# Patient Record
Sex: FEMALE | Race: WHITE | NOT HISPANIC OR LATINO | Employment: OTHER | URBAN - METROPOLITAN AREA
[De-identification: names, ages, dates, MRNs, and addresses within clinical notes are randomized per-mention and may not be internally consistent; named-entity substitution may affect disease eponyms.]

---

## 2017-01-20 ENCOUNTER — TELEPHONE (OUTPATIENT)
Dept: OBSTETRICS AND GYNECOLOGY | Facility: CLINIC | Age: 67
End: 2017-01-20

## 2017-01-20 NOTE — TELEPHONE ENCOUNTER
Pt called and and stated she has vaginally itching. She took a diflucan this morning. Advised her that it may take up to 2 days to work. Advised her to call back if it doesn't resolve. She voiced understanding.

## 2017-01-20 NOTE — TELEPHONE ENCOUNTER
"----- Message from Cee Castanon sent at 1/20/2017  8:37 AM CST -----  Contact: thomas   "burning sensation in that area"   X 3 days   Call back    "

## 2017-01-23 ENCOUNTER — TELEPHONE (OUTPATIENT)
Dept: OBSTETRICS AND GYNECOLOGY | Facility: CLINIC | Age: 67
End: 2017-01-23

## 2017-01-25 ENCOUNTER — TELEPHONE (OUTPATIENT)
Dept: OBSTETRICS AND GYNECOLOGY | Facility: CLINIC | Age: 67
End: 2017-01-25

## 2017-01-25 DIAGNOSIS — L90.0 LICHEN SCLEROSUS ET ATROPHICUS: ICD-10-CM

## 2017-01-26 RX ORDER — CLOBETASOL PROPIONATE 0.5 MG/G
CREAM TOPICAL
Qty: 30 G | Refills: 1 | Status: SHIPPED | OUTPATIENT
Start: 2017-01-26 | End: 2017-04-05 | Stop reason: SDUPTHER

## 2017-02-02 ENCOUNTER — TELEPHONE (OUTPATIENT)
Dept: OBSTETRICS AND GYNECOLOGY | Facility: CLINIC | Age: 67
End: 2017-02-02

## 2017-02-02 NOTE — TELEPHONE ENCOUNTER
----- Message from Rylie Frey sent at 2/2/2017 12:55 PM CST -----  Contact: Herminia  Patient is calling regarding a change in medication to replace Rx Clobetasol Propionate 15 mg as insurance has given three alternatives for lower cost: Mometasone, Fluticasone, and Betamethasone Valerate. States if Dr Coronel does not want to change it is okay with patient. Please call with outcome [phone]. Thanks!

## 2017-02-03 RX ORDER — BETAMETHASONE VALERATE 1.2 MG/G
CREAM TOPICAL 2 TIMES DAILY
Qty: 45 G | Refills: 1 | Status: SHIPPED | OUTPATIENT
Start: 2017-02-03 | End: 2018-07-22

## 2017-03-15 ENCOUNTER — TELEPHONE (OUTPATIENT)
Dept: GASTROENTEROLOGY | Facility: CLINIC | Age: 67
End: 2017-03-15

## 2017-03-15 NOTE — TELEPHONE ENCOUNTER
----- Message from Deon Hughes sent at 3/15/2017 10:16 AM CDT -----  Pt called stated that she need a call back regarding her medication/pls call back at [phone]

## 2017-03-16 ENCOUNTER — OFFICE VISIT (OUTPATIENT)
Dept: GASTROENTEROLOGY | Facility: CLINIC | Age: 67
End: 2017-03-16
Payer: MEDICARE

## 2017-03-16 VITALS
HEIGHT: 60 IN | HEART RATE: 76 BPM | SYSTOLIC BLOOD PRESSURE: 130 MMHG | BODY MASS INDEX: 34.89 KG/M2 | WEIGHT: 177.69 LBS | DIASTOLIC BLOOD PRESSURE: 82 MMHG | RESPIRATION RATE: 18 BRPM

## 2017-03-16 DIAGNOSIS — R10.32 LEFT LOWER QUADRANT PAIN: Primary | ICD-10-CM

## 2017-03-16 DIAGNOSIS — Z87.19 HISTORY OF COLITIS: ICD-10-CM

## 2017-03-16 DIAGNOSIS — Z87.19 HISTORY OF DIVERTICULOSIS: ICD-10-CM

## 2017-03-16 DIAGNOSIS — B37.0 CANDIDIASIS, MOUTH: ICD-10-CM

## 2017-03-16 DIAGNOSIS — R79.89 ELEVATED LIVER FUNCTION TESTS: ICD-10-CM

## 2017-03-16 DIAGNOSIS — Z86.010 HISTORY OF COLON POLYPS: ICD-10-CM

## 2017-03-16 DIAGNOSIS — K21.00 GASTROESOPHAGEAL REFLUX DISEASE WITH ESOPHAGITIS: ICD-10-CM

## 2017-03-16 DIAGNOSIS — Z87.2 HISTORY OF LICHEN PLANUS: ICD-10-CM

## 2017-03-16 PROCEDURE — 1159F MED LIST DOCD IN RCRD: CPT | Mod: S$GLB,,, | Performed by: NURSE PRACTITIONER

## 2017-03-16 PROCEDURE — 1160F RVW MEDS BY RX/DR IN RCRD: CPT | Mod: S$GLB,,, | Performed by: NURSE PRACTITIONER

## 2017-03-16 PROCEDURE — 99214 OFFICE O/P EST MOD 30 MIN: CPT | Mod: S$GLB,,, | Performed by: NURSE PRACTITIONER

## 2017-03-16 PROCEDURE — 99999 PR PBB SHADOW E&M-EST. PATIENT-LVL V: CPT | Mod: PBBFAC,,, | Performed by: NURSE PRACTITIONER

## 2017-03-16 PROCEDURE — 1157F ADVNC CARE PLAN IN RCRD: CPT | Mod: S$GLB,,, | Performed by: NURSE PRACTITIONER

## 2017-03-16 PROCEDURE — 1125F AMNT PAIN NOTED PAIN PRSNT: CPT | Mod: S$GLB,,, | Performed by: NURSE PRACTITIONER

## 2017-03-16 RX ORDER — FLUTICASONE PROPIONATE 50 MCG
1 SPRAY, SUSPENSION (ML) NASAL 2 TIMES DAILY PRN
COMMUNITY

## 2017-03-16 RX ORDER — NYSTATIN 100000 [USP'U]/ML
4 SUSPENSION ORAL 4 TIMES DAILY
Qty: 224 ML | Refills: 0 | Status: SHIPPED | OUTPATIENT
Start: 2017-03-16 | End: 2017-03-30

## 2017-03-16 NOTE — MR AVS SNAPSHOT
[facility]  [address] [facility] [address]  [facility] [address]  Phone: [phone]                  Herminia Wooten   3/16/2017 2:00 PM   Office Visit    Description:  Female : 1950   Provider:  SHANNAN Mcgraw   Department:  [facility]           Reason for Visit     Abdominal Pain           Diagnoses this Visit        Comments    Left lower quadrant pain    -  Primary     History of diverticulosis         History of colitis         Elevated liver function tests         History of colon polyps         Candidiasis, mouth                To Do List           Future Appointments        Provider Department Dept Phone    2017 10:00 AM Emerald Coronel MD [facility] - [phone]      Goals (5 Years of Data)     None      Follow-Up and Disposition     Return in about 2 months (around 2017), or if symptoms worsen or fail to improve.       These Medications        Disp Refills Start End    nystatin (MYCOSTATIN) 100,000 unit/mL suspension 224 mL 0 3/16/2017 3/30/2017    Take 4 mLs (400,000 Units total) by mouth 4 (four) times daily. Swish and swallow - Oral    Pharmacy: [facility] [address] [place] [address] AT [facility] Ph #: [phone]         [facility]     [facility] Nurse Care Line - 24/7 Assistance  Registered nurses in the [facility] provide clinical advisement, health education, appointment booking, and other advisory services.  Call for this free service at [phone].             Medications           Message regarding Medications     Verify the changes and/or additions to your medication regime listed below are the same as discussed with your clinician today.  If any of these changes or additions are incorrect, please notify your healthcare provider.        START taking these NEW medications        Refills    nystatin (MYCOSTATIN) 100,000 unit/mL suspension 0    Sig:  Take 4 mLs (400,000 Units total) by mouth 4 (four) times daily. Swish and swallow    Class: Normal    Route: Oral      STOP taking these medications     azelastine-fluticasone (DYMISTA) 137-50 mcg/spray Spry 1 spray by Nasal route 2 (two) times daily.    cephALEXin (KEFLEX) 500 MG capsule Take 500 mg by mouth 2 times daily 2 hours after meal.    hydrocodone-acetaminophen 5-325mg (NORCO) 5-325 mg per tablet Take 1 tablet by mouth every 6 (six) hours as needed for Pain.    hydrocortisone (ANUSOL-HC) 25 mg suppository 25 mg 2 (two) times daily as needed.     olmesartan (BENICAR) 20 MG tablet Take 10 mg by mouth once daily.    pantoprazole (PROTONIX) 40 MG tablet TAKE 1 TABLET(40 MG) BY MOUTH BEFORE BREAKFAST           Verify that the below list of medications is an accurate representation of the medications you are currently taking.  If none reported, the list may be blank. If incorrect, please contact your healthcare provider. Carry this list with you in case of emergency.           Current Medications     ACETYLCYSTEINE (N-ACETYL-L-CYSTEINE MISC) 600 mg by Misc.(Non-Drug; Combo Route) route once daily.    albuterol 90 mcg/actuation inhaler Inhale 2 puffs into the lungs every 4 (four) hours as needed for Wheezing. Rescue    aspirin (ECOTRIN) 81 MG EC tablet Take 81 mg by mouth once daily.    atenolol (TENORMIN) 25 MG tablet Take 12.5 mg by mouth once daily.     atorvastatin (LIPITOR) 20 MG tablet Take 20 mg by mouth once daily.    cholecalciferol, vitamin D3, (VITAMIN D3) 2,000 unit Tab Take by mouth once daily.    ciprofloxacin HCl (CIPRO) 500 MG tablet Take 1 tablet (500 mg total) by mouth 2 (two) times daily.    clobetasol (TEMOVATE) 0.05 % cream APPLY 1/2 GRAM TOPICALLY twice weekly    estradiol (ESTRACE) 0.01 % (0.1 mg/gram) vaginal cream Place 1 g vaginally every other day.    fluconazole (DIFLUCAN) 150 MG Tab Take 1 tablet (150 mg total) by mouth Every 3 (three) days.    fluticasone (FLONASE) 50 mcg/actuation  "nasal spray 1 spray by Each Nare route once daily.    ibuprofen (ADVIL,MOTRIN) 600 MG tablet Take 1 tablet (600 mg total) by mouth every 6 (six) hours as needed for Pain.    levothyroxine (SYNTHROID) 25 MCG tablet 25 mcg once daily.     lorazepam (ATIVAN) 0.5 MG tablet Take 0.5 mg by mouth every 6 (six) hours as needed.      losartan (COZAAR) 25 MG tablet Take 25 mg by mouth once daily.    meclizine (ANTIVERT) 25 mg tablet 25 mg 3 (three) times daily as needed.     methen-m.blue-s.phos-phsal-hyo 118-10-40.8-36 mg Cap Take 1 capsule by mouth once daily.    metronidazole (FLAGYL) 500 MG tablet Take 1 tablet (500 mg total) by mouth 3 (three) times daily.    ondansetron (ZOFRAN-ODT) 4 MG TbDL Take 1-2 tablets (4-8 mg total) by mouth every 8 (eight) hours as needed.    phenazopyridine (PYRIDIUM) 100 MG tablet TAKE ONE TABLET BY MOUTH THREE TIMES DAILY AS NEEDED FOR PAIN    ranitidine (ZANTAC) 150 MG tablet Take 2 tablets (300 mg total) by mouth nightly.    sodium chloride (SALINE NASAL) 0.65 % nasal spray 1 spray by Nasal route as needed.    triamcinolone acetonide 0.1% (KENALOG) 0.1 % paste     vitamin D 1000 units Tab Take 185 mg by mouth once daily.    betamethasone valerate 0.1% (VALISONE) 0.1 % Crea Apply topically 2 (two) times daily.    dicyclomine (BENTYL) 10 MG capsule Take 1 capsule (10 mg total) by mouth 4 (four) times daily before meals and nightly. 1 or 2 caps, AC & HS, to ease bloating and diarrhea.    mometasone 220 mcg (30 doses) inhaler Inhale 2 puffs into the lungs once daily.    nystatin (MYCOSTATIN) 100,000 unit/mL suspension Take 4 mLs (400,000 Units total) by mouth 4 (four) times daily. Swish and swallow           Clinical Reference Information           Your Vitals Were     BP Pulse Resp Height Weight Last Period    130/82 76 18 4' 11.5" (1.511 m) 80.6 kg (177 lb 11.1 oz) 10/07/1976    BMI                35.29 kg/m2          Blood Pressure          Most Recent Value    BP  130/82      Allergies as " of 3/16/2017     Codeine    Caffeine    Percodan [Oxycodone Hcl-oxycodone-asa]      Immunizations Administered on Date of Encounter - 3/16/2017     None      Orders Placed During Today's Visit     Future Labs/Procedures Expected by Expires    CBC auto differential  3/16/2017 5/15/2018    CT Abdomen Pelvis With Contrast  3/16/2017 3/16/2018    Hepatic function panel  3/16/2017 5/15/2018    Hepatitis panel, acute  3/16/2017 3/16/2018      Instructions      Abdominal Pain    Abdominal pain is pain in the stomach or belly area. Everyone has this pain from time to time. In many cases it goes away on its own. But abdominal pain can sometimes be due to a serious problem, such as appendicitis. So its important to know when to seek help.  Causes of abdominal pain  There are many possible causes of abdominal pain. Common causes in adults include:  · Constipation, diarrhea, or gas  · Stomach acid flowing back up into the esophagus (acid reflux or heartburn)  · Severe acid reflux, called GERD (gastroesophageal reflux disease)  · A sore in the lining of the stomach or small intestine (peptic ulcer)  · Inflammation of the gallbladder, liver, or pancreas  · Gallstones or kidney stones  · Appendicitis   · Intestinal blockage   · An internal organ pushing through a muscle or other tissue (hernia)  · Urinary tract infections  · In women, menstrual cramps, fibroids, or endometriosis  · Inflammation or infection of the intestines  Diagnosing the cause of abdominal pain  Your healthcare provider will do a physical exam help find the cause of your pain. If needed, tests will be ordered. Belly pain has many possible causes. So it can be hard to find the reason for your pain. Giving details about your pain can help. Tell your provider where and when you feel the pain, and what makes it better or worse. Also let your provider know if you have other symptoms such as:  · Fever  · Tiredness  · Upset stomach (nausea)  · Vomiting  · Changes in  bathroom habits  Treating abdominal pain  Some causes of pain need emergency medical treatment right away. These include appendicitis or a bowel blockage. Other problems can be treated with rest, fluids, or medicines. Your healthcare provider can give you specific instructions for treatment or self-care based on what is causing your pain.  If you have vomiting or diarrhea, sip water or other clear fluids. When you are ready to eat solid foods again, start with small amounts of easy-to-digest, low-fat foods. These include apple sauce, toast, or crackers.   When to seek medical care  Call 911 or go to the hospital right away if you:  · Cant pass stool and are vomiting  · Are vomiting blood or have bloody diarrhea or black, tarry diarrhea  · Have chest, neck, or shoulder pain  · Feel like you might pass out  · Have pain in your shoulder blades with nausea  · Have sudden, severe belly pain  · Have new, severe pain unlike any you have felt before  · Have a belly that is rigid, hard, and tender to touch  Call your healthcare provider if you have:  · Pain for more than 5 days  · Bloating for more than 2 days  · Diarrhea for more than 5 days  · A fever of 100.4°F (38.0°C) or higher, or as directed by your provider  · Pain that gets worse  · Weight loss for no reason  · Continued lack of appetite  · Blood in your stool  How to prevent abdominal pain  Here are some tips to help prevent abdominal pain:  · Eat smaller amounts of food at one time.  · Avoid greasy, fried, or other high-fat foods.  · Avoid foods that give you gas.  · Exercise regularly.  · Drink plenty of fluids.  To help prevent GERD symptoms:  · Quit smoking.  · Reduce alcohol and certain foods that increase stomach acid.  · Avoid aspirin and over-the-counter pain and fever medicines (NSAIDS or nonsteroidal anti-inflammatory drugs), if possible  · Lose extra weight.  · Finish eating at least 2 hours before you go to bed or lie down.  · Raise the head of your  bed.  Date Last Reviewed: 7/1/2016  © 6085-8293 [employer]. [address] [place] [address], [place] [address]. All rights reserved. This information is not intended as a substitute for professional medical care. Always follow your healthcare professional's instructions.        Understanding Diverticulosis and Diverticulitis     Pouches or diverticula usually occur in the lower part of the colon called the sigmoid.     The colon (large intestine) is the last part of the digestive tract. It absorbs water from stool and changes it from a liquid to a solid. In certain cases, small pouches called diverticula can form in the colon wall. This condition is called diverticulosis. The pouches can become infected. If this happens, it becomes a more serious problem called diverticulitis. These problems can be painful. But they can be managed.  Managing your condition  Diet changes or medicines may be prescribed.   If you have diverticulosis  Recommendations include:  · Diet changes are often enough to control symptoms. The main changes are adding fiber (roughage) and drinking more water. Fiber absorbs water as it travels through your colon. This helps your stool stay soft and move smoothly. Water helps this process.  · If needed, you may be told to take over-the-counter stool softeners.  · To help relieve pain, antispasmodic medicines may be prescribed.  · Watch for changes in your bowel movements. Tell the healthcare provider if you notice any changes.  · Begin an exercise program. Ask your healthcare provider how to get started.  · Get plenty of rest and sleep.   If you have diverticulitis  Treatment depends on how bad your symptoms are.  · For mild symptoms. You may be put on a liquid diet for a short time. Antibiotics are usually prescribed. If these two steps relieve your symptoms, you may then be prescribed a high-fiber diet. If you still have symptoms, your healthcare provider will discuss more treatment choices  with you.  · For severe symptoms. You may need to be admitted to the hospital. There, you can be given IV antibiotics and fluids. You will also be put on a low-fiber or liquid diet. Although not common, surgery is needed in some people with severe symptoms.  [place] to colon health     Diverticulitis occurs when the pouches become infected or inflamed.     Help keep your colon healthy with a diet that includes plenty of high-fiber fruits, vegetables, and whole grains. Drink plenty of liquids like water and juice. Maintain a healthy lifestyle including regular exercise, stress management, and adequate rest and sleep.   Date Last Reviewed: 7/1/2016 © 2000-2016 [employer]. [address] [place] [address], [place] [address]. All rights reserved. This information is not intended as a substitute for professional medical care. Always follow your healthcare professional's instructions.        Discharge Instructions for Diverticulitis  You have been diagnosed with diverticulitis. This is a condition in which small pouches form in your colon (large intestine) and become inflamed or infected. Follow the guidelines below for home care.  As you recover  Tips for recovery include:  · Eat a low-fiber diet. Your healthcare provider may advise a liquid diet. This gives your bowel a chance to rest so that it can recover.  · Foods to include: flake cereal, mashed potatoes, pancakes, waffles, pasta, white bread, rice, applesauce, bananas, eggs, fish, poultry, tofu, and well-cooked vegetables  · Take your medicines as directed. Do not stop taking the medicines, even if you feel better.  · Monitor your temperature and report any rising temperature to your healthcare provider.  · Take antibiotics exactly as directed. Do not miss any and keep taking them even if you feel better.   · Drink 6 to 8 glasses of water every day, unless directed otherwise.  · Use a heating pad or hot water bottle to reduce abdominal cramping or  pain.  Preventing diverticulitis in the future  Tips for prevention include:  · Eat a high-fiber diet. Fiber adds bulk to the stool so that it passes through the large intestine more easily.  · Keep drinking 6 to 8 glasses of water every day, unless directed otherwise.  · Begin an exercise program. Ask your healthcare provider how to get started. You can benefit from simple activities such as walking or gardening.  · Treat diarrhea with a bland diet. Start with liquids only, then slowly add fiber over time.  · Watch for changes in your bowel movements (constipation to diarrhea).  · Avoid constipation with fiber and add a stool softener if needed.   · Get plenty of rest and sleep.  Follow-up care  Make a follow-up appointment as directed by our staff.  When to call your healthcare provider  Call your healthcare provider immediately if you have any of the following:  · Fever of 100.4°F (38.0°C) or higher, or as directed by your healthcare provider  · Chills  · Severe cramps in the belly, most commonly the lower left side  · Tenderness in the belly, most commonly the lower left side  · Nausea and vomiting  · Bleeding from your rectum   Date Last Reviewed: 7/1/2016  © 4817-2460 [employer]. [address] [place] [address]. All rights reserved. This information is not intended as a substitute for professional medical care. Always follow your healthcare professional's instructions.             Language Assistance Services     ATTENTION: Language assistance services are available, free of charge. Please call [phone].      ATENCIÓN: Si habla español, tiene a arias disposición servicios gratuitos de asistencia lingüística. Llame al [phone].     [facility] Ý: N?u b?n nói Ti?ng Vi?t, có các d?ch v? h? tr? ngôn ng? mi?n phí dành cho b?n. G?i s? [phone].         [facility] complies with applicable Federal civil rights laws and does not discriminate on the basis of race, color,  national origin, age, disability, or sex.

## 2017-03-16 NOTE — PATIENT INSTRUCTIONS
Abdominal Pain    Abdominal pain is pain in the stomach or belly area. Everyone has this pain from time to time. In many cases it goes away on its own. But abdominal pain can sometimes be due to a serious problem, such as appendicitis. So its important to know when to seek help.  Causes of abdominal pain  There are many possible causes of abdominal pain. Common causes in adults include:  · Constipation, diarrhea, or gas  · Stomach acid flowing back up into the esophagus (acid reflux or heartburn)  · Severe acid reflux, called GERD (gastroesophageal reflux disease)  · A sore in the lining of the stomach or small intestine (peptic ulcer)  · Inflammation of the gallbladder, liver, or pancreas  · Gallstones or kidney stones  · Appendicitis   · Intestinal blockage   · An internal organ pushing through a muscle or other tissue (hernia)  · Urinary tract infections  · In women, menstrual cramps, fibroids, or endometriosis  · Inflammation or infection of the intestines  Diagnosing the cause of abdominal pain  Your healthcare provider will do a physical exam help find the cause of your pain. If needed, tests will be ordered. Belly pain has many possible causes. So it can be hard to find the reason for your pain. Giving details about your pain can help. Tell your provider where and when you feel the pain, and what makes it better or worse. Also let your provider know if you have other symptoms such as:  · Fever  · Tiredness  · Upset stomach (nausea)  · Vomiting  · Changes in bathroom habits  Treating abdominal pain  Some causes of pain need emergency medical treatment right away. These include appendicitis or a bowel blockage. Other problems can be treated with rest, fluids, or medicines. Your healthcare provider can give you specific instructions for treatment or self-care based on what is causing your pain.  If you have vomiting or diarrhea, sip water or other clear fluids. When you are ready to eat solid foods again,  start with small amounts of easy-to-digest, low-fat foods. These include apple sauce, toast, or crackers.   When to seek medical care  Call 911 or go to the hospital right away if you:  · Cant pass stool and are vomiting  · Are vomiting blood or have bloody diarrhea or black, tarry diarrhea  · Have chest, neck, or shoulder pain  · Feel like you might pass out  · Have pain in your shoulder blades with nausea  · Have sudden, severe belly pain  · Have new, severe pain unlike any you have felt before  · Have a belly that is rigid, hard, and tender to touch  Call your healthcare provider if you have:  · Pain for more than 5 days  · Bloating for more than 2 days  · Diarrhea for more than 5 days  · A fever of 100.4°F (38.0°C) or higher, or as directed by your provider  · Pain that gets worse  · Weight loss for no reason  · Continued lack of appetite  · Blood in your stool  How to prevent abdominal pain  Here are some tips to help prevent abdominal pain:  · Eat smaller amounts of food at one time.  · Avoid greasy, fried, or other high-fat foods.  · Avoid foods that give you gas.  · Exercise regularly.  · Drink plenty of fluids.  To help prevent GERD symptoms:  · Quit smoking.  · Reduce alcohol and certain foods that increase stomach acid.  · Avoid aspirin and over-the-counter pain and fever medicines (NSAIDS or nonsteroidal anti-inflammatory drugs), if possible  · Lose extra weight.  · Finish eating at least 2 hours before you go to bed or lie down.  · Raise the head of your bed.  Date Last Reviewed: 7/1/2016  © 3225-3723 [employer]. [address] [place] [address], [place] [address]. All rights reserved. This information is not intended as a substitute for professional medical care. Always follow your healthcare professional's instructions.        Understanding Diverticulosis and Diverticulitis     Pouches or diverticula usually occur in the lower part of the colon called the sigmoid.     The colon (large intestine)  is the last part of the digestive tract. It absorbs water from stool and changes it from a liquid to a solid. In certain cases, small pouches called diverticula can form in the colon wall. This condition is called diverticulosis. The pouches can become infected. If this happens, it becomes a more serious problem called diverticulitis. These problems can be painful. But they can be managed.  Managing your condition  Diet changes or medicines may be prescribed.   If you have diverticulosis  Recommendations include:  · Diet changes are often enough to control symptoms. The main changes are adding fiber (roughage) and drinking more water. Fiber absorbs water as it travels through your colon. This helps your stool stay soft and move smoothly. Water helps this process.  · If needed, you may be told to take over-the-counter stool softeners.  · To help relieve pain, antispasmodic medicines may be prescribed.  · Watch for changes in your bowel movements. Tell the healthcare provider if you notice any changes.  · Begin an exercise program. Ask your healthcare provider how to get started.  · Get plenty of rest and sleep.   If you have diverticulitis  Treatment depends on how bad your symptoms are.  · For mild symptoms. You may be put on a liquid diet for a short time. Antibiotics are usually prescribed. If these two steps relieve your symptoms, you may then be prescribed a high-fiber diet. If you still have symptoms, your healthcare provider will discuss more treatment choices with you.  · For severe symptoms. You may need to be admitted to the hospital. There, you can be given IV antibiotics and fluids. You will also be put on a low-fiber or liquid diet. Although not common, surgery is needed in some people with severe symptoms.  [place] to colon health     Diverticulitis occurs when the pouches become infected or inflamed.     Help keep your colon healthy with a diet that includes plenty of high-fiber fruits, vegetables, and  whole grains. Drink plenty of liquids like water and juice. Maintain a healthy lifestyle including regular exercise, stress management, and adequate rest and sleep.   Date Last Reviewed: 7/1/2016  © 4681-7401 [employer]. [address] [place] [address], [place] [address]. All rights reserved. This information is not intended as a substitute for professional medical care. Always follow your healthcare professional's instructions.        Discharge Instructions for Diverticulitis  You have been diagnosed with diverticulitis. This is a condition in which small pouches form in your colon (large intestine) and become inflamed or infected. Follow the guidelines below for home care.  As you recover  Tips for recovery include:  · Eat a low-fiber diet. Your healthcare provider may advise a liquid diet. This gives your bowel a chance to rest so that it can recover.  · Foods to include: flake cereal, mashed potatoes, pancakes, waffles, pasta, white bread, rice, applesauce, bananas, eggs, fish, poultry, tofu, and well-cooked vegetables  · Take your medicines as directed. Do not stop taking the medicines, even if you feel better.  · Monitor your temperature and report any rising temperature to your healthcare provider.  · Take antibiotics exactly as directed. Do not miss any and keep taking them even if you feel better.   · Drink 6 to 8 glasses of water every day, unless directed otherwise.  · Use a heating pad or hot water bottle to reduce abdominal cramping or pain.  Preventing diverticulitis in the future  Tips for prevention include:  · Eat a high-fiber diet. Fiber adds bulk to the stool so that it passes through the large intestine more easily.  · Keep drinking 6 to 8 glasses of water every day, unless directed otherwise.  · Begin an exercise program. Ask your healthcare provider how to get started. You can benefit from simple activities such as walking or gardening.  · Treat diarrhea with a bland diet. Start with  liquids only, then slowly add fiber over time.  · Watch for changes in your bowel movements (constipation to diarrhea).  · Avoid constipation with fiber and add a stool softener if needed.   · Get plenty of rest and sleep.  Follow-up care  Make a follow-up appointment as directed by our staff.  When to call your healthcare provider  Call your healthcare provider immediately if you have any of the following:  · Fever of 100.4°F (38.0°C) or higher, or as directed by your healthcare provider  · Chills  · Severe cramps in the belly, most commonly the lower left side  · Tenderness in the belly, most commonly the lower left side  · Nausea and vomiting  · Bleeding from your rectum   Date Last Reviewed: 7/1/2016  © 7474-8666 [employer]. [address] [place] [address], [place] [address]. All rights reserved. This information is not intended as a substitute for professional medical care. Always follow your healthcare professional's instructions.
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
## 2017-03-17 ENCOUNTER — TELEPHONE (OUTPATIENT)
Dept: GASTROENTEROLOGY | Facility: CLINIC | Age: 67
End: 2017-03-17

## 2017-03-17 NOTE — TELEPHONE ENCOUNTER
Pt asking if she can take imodium, also what exercise can she do? Elliptical, weights for upper and lower body, biking? Please advise

## 2017-03-17 NOTE — TELEPHONE ENCOUNTER
----- Message from RT Coleen sent at 3/17/2017 10:50 AM CDT -----  Contact: pt    pt  requesting a call back soon to she would like to know if it is fine to take immoduim medication, thanks.

## 2017-03-17 NOTE — TELEPHONE ENCOUNTER
Is patient having diarrhea? Patient had reported constipation yesterday and was taking MiraLax daily for it. Patient can try imodium for diarrhea but take only prn and try to decrease use of MiraLax to only prn instead of daily. Diarrhea may be related to use of MiraLax or the antibiotics. If diarrhea persist, follow-up. Activity as tolerated. I would not recommend any strenuous activity/exercise for the next 2 weeks.  Please inform patient of above recommendations.  Thanks,   AYLIN

## 2017-04-05 ENCOUNTER — OFFICE VISIT (OUTPATIENT)
Dept: OBSTETRICS AND GYNECOLOGY | Facility: CLINIC | Age: 67
End: 2017-04-05
Payer: MEDICARE

## 2017-04-05 VITALS — WEIGHT: 175.5 LBS | SYSTOLIC BLOOD PRESSURE: 130 MMHG | BODY MASS INDEX: 34.85 KG/M2 | DIASTOLIC BLOOD PRESSURE: 76 MMHG

## 2017-04-05 DIAGNOSIS — L90.0 LICHEN SCLEROSUS ET ATROPHICUS: ICD-10-CM

## 2017-04-05 DIAGNOSIS — N81.6 RECTOCELE: ICD-10-CM

## 2017-04-05 DIAGNOSIS — L23.9 ALLERGIC CONTACT DERMATITIS, UNSPECIFIED TRIGGER: Primary | ICD-10-CM

## 2017-04-05 DIAGNOSIS — Z79.890 POSTMENOPAUSAL HRT (HORMONE REPLACEMENT THERAPY): ICD-10-CM

## 2017-04-05 DIAGNOSIS — N95.2 ATROPHIC VAGINITIS: ICD-10-CM

## 2017-04-05 DIAGNOSIS — N93.0 POSTCOITAL BLEEDING: ICD-10-CM

## 2017-04-05 PROCEDURE — 1160F RVW MEDS BY RX/DR IN RCRD: CPT | Mod: S$GLB,,, | Performed by: OBSTETRICS & GYNECOLOGY

## 2017-04-05 PROCEDURE — 99213 OFFICE O/P EST LOW 20 MIN: CPT | Mod: S$GLB,,, | Performed by: OBSTETRICS & GYNECOLOGY

## 2017-04-05 PROCEDURE — 99999 PR PBB SHADOW E&M-EST. PATIENT-LVL III: CPT | Mod: PBBFAC,,, | Performed by: OBSTETRICS & GYNECOLOGY

## 2017-04-05 PROCEDURE — 1157F ADVNC CARE PLAN IN RCRD: CPT | Mod: S$GLB,,, | Performed by: OBSTETRICS & GYNECOLOGY

## 2017-04-05 PROCEDURE — 1126F AMNT PAIN NOTED NONE PRSNT: CPT | Mod: S$GLB,,, | Performed by: OBSTETRICS & GYNECOLOGY

## 2017-04-05 PROCEDURE — 1159F MED LIST DOCD IN RCRD: CPT | Mod: S$GLB,,, | Performed by: OBSTETRICS & GYNECOLOGY

## 2017-04-05 RX ORDER — CLOBETASOL PROPIONATE 0.5 MG/G
CREAM TOPICAL
Qty: 30 G | Refills: 1 | Status: SHIPPED | OUTPATIENT
Start: 2017-04-05 | End: 2018-08-23 | Stop reason: SDUPTHER

## 2017-04-05 RX ORDER — ESTRADIOL 0.1 MG/G
1 CREAM VAGINAL EVERY OTHER DAY
Qty: 42 G | Refills: 1 | Status: SHIPPED | OUTPATIENT
Start: 2017-04-05 | End: 2017-12-06 | Stop reason: SDUPTHER

## 2017-04-05 RX ORDER — LOSARTAN POTASSIUM 50 MG/1
100 TABLET ORAL DAILY
COMMUNITY
Start: 2017-01-12

## 2017-04-05 RX ORDER — GLYCERIN 2 G/1
SUPPOSITORY RECTAL
Refills: 0 | COMMUNITY
Start: 2017-03-10 | End: 2017-04-05
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
## 2017-04-05 NOTE — MR AVS SNAPSHOT
[facility]  [address]  [facility] [address]  Phone: [phone]                  Herminia Wooten   2017 10:00 AM   Office Visit    Description:  Female : 1950   Provider:  Emerald Coronel MD   Department:  [facility]           Reason for Visit     Vaginal Bleeding     Vaginal Pain     Rectocele                To Do List           Future Appointments        Provider Department Dept Phone    2017 10:30 AM [facility] CT[facility] LIMIT [facility] LBS [facility] [phone]      Goals (5 Years of Data)     None      [facility]     [facility] Nurse Care Line -  Assistance  Unless otherwise directed by your provider, please contact [facility], our nurse care line that is available for  assistance.     Registered nurses in the [facility] provide: appointment scheduling, clinical advisement, health education, and other advisory services.  Call: [phone] (toll free)               Medications           Message regarding Medications     Verify the changes and/or additions to your medication regime listed below are the same as discussed with your clinician today.  If any of these changes or additions are incorrect, please notify your healthcare provider.        STOP taking these medications     fluconazole (DIFLUCAN) 150 MG Tab Take 1 tablet (150 mg total) by mouth Every 3 (three) days.    FLEET GLYCERIN, ADULT, suppository INSERT 1 SUPPOSITORY RECTALLY BID PRF CONSTIPATION           Verify that the below list of medications is an accurate representation of the medications you are currently taking.  If none reported, the list may be blank. If incorrect, please contact your healthcare provider. Carry this list with you in case of emergency.           Current Medications     losartan (COZAAR) 50 MG tablet     ACETYLCYSTEINE (N-ACETYL-L-CYSTEINE MISC) 600 mg by Misc.(Non-Drug; Combo Route) route once daily.    albuterol 90  mcg/actuation inhaler Inhale 2 puffs into the lungs every 4 (four) hours as needed for Wheezing. Rescue    aspirin (ECOTRIN) 81 MG EC tablet Take 81 mg by mouth once daily.    atenolol (TENORMIN) 25 MG tablet Take 12.5 mg by mouth once daily.     atorvastatin (LIPITOR) 20 MG tablet Take 20 mg by mouth once daily.    betamethasone valerate 0.1% (VALISONE) 0.1 % Crea Apply topically 2 (two) times daily.    cholecalciferol, vitamin D3, (VITAMIN D3) 2,000 unit Tab Take by mouth once daily.    clobetasol (TEMOVATE) 0.05 % cream APPLY 1/2 GRAM TOPICALLY twice weekly    dicyclomine (BENTYL) 10 MG capsule Take 1 capsule (10 mg total) by mouth 4 (four) times daily before meals and nightly. 1 or 2 caps, AC & HS, to ease bloating and diarrhea.    estradiol (ESTRACE) 0.01 % (0.1 mg/gram) vaginal cream Place 1 g vaginally every other day.    fluticasone (FLONASE) 50 mcg/actuation nasal spray 1 spray by Each Nare route once daily.    ibuprofen (ADVIL,MOTRIN) 600 MG tablet Take 1 tablet (600 mg total) by mouth every 6 (six) hours as needed for Pain.    levothyroxine (SYNTHROID) 25 MCG tablet 25 mcg once daily.     lorazepam (ATIVAN) 0.5 MG tablet Take 0.5 mg by mouth every 6 (six) hours as needed.      meclizine (ANTIVERT) 25 mg tablet 25 mg 3 (three) times daily as needed.     methen-m.blue-s.[facility]-hyo 118-10-40.8-36 mg Cap Take 1 capsule by mouth once daily.    mometasone 220 mcg (30 doses) inhaler Inhale 2 puffs into the lungs once daily.    ondansetron (ZOFRAN-ODT) 4 MG TbDL Take 1-2 tablets (4-8 mg total) by mouth every 8 (eight) hours as needed.    phenazopyridine (PYRIDIUM) 100 MG tablet TAKE ONE TABLET BY MOUTH THREE TIMES DAILY AS NEEDED FOR PAIN    ranitidine (ZANTAC) 150 MG tablet Take 2 tablets (300 mg total) by mouth nightly.    sodium chloride (SALINE NASAL) 0.65 % nasal spray 1 spray by Nasal route as needed.    triamcinolone acetonide 0.1% (KENALOG) 0.1 % paste     vitamin D 1000 units Tab Take 185 mg by mouth  once daily.           Clinical Reference Information           Your Vitals Were     BP Weight Last Period BMI       130/76 79.6 kg (175 lb 7.8 oz) 10/07/1976 34.85 kg/m2       Blood Pressure          Most Recent Value    BP  130/76      Allergies as of 4/5/2017     Codeine    Caffeine    Percodan [Oxycodone Hcl-oxycodone-asa]      Immunizations Administered on Date of Encounter - 4/5/2017     None      Language Assistance Services     ATTENTION: Language assistance services are available, free of charge. Please call [phone].      ATENCIÓN: Si habla español, tiene a arias disposición servicios gratuitos de asistencia lingüística. Llame al [phone].     DORA Ý: N?u b?n nói Ti?ng Vi?t, có các d?ch v? h? tr? ngôn ng? mi?n phí dành cho b?n. G?i s? [phone].         [facility] complies with applicable Federal civil rights laws and does not discriminate on the basis of race, color, national origin, age, disability, or sex.

## 2017-04-07 ENCOUNTER — TELEPHONE (OUTPATIENT)
Dept: OBSTETRICS AND GYNECOLOGY | Facility: CLINIC | Age: 67
End: 2017-04-07

## 2017-04-07 NOTE — TELEPHONE ENCOUNTER
LVM that I spoke with her insurance and the medication does not need a PA. That she has not met her deductable yet and after she does the cost will go down. If any further questions please contact the office.

## 2017-04-07 NOTE — TELEPHONE ENCOUNTER
----- Message from Kristel Brantley sent at 4/6/2017  9:12 AM CDT -----  Contact: Patient  Herminia, patient [phone], Patient is calling about a pre authorization for the Rx Clobetasol Tropinate cream with her insurance. Abiel [phone]. Script is $92. Please advise. Thanks.

## 2017-04-25 ENCOUNTER — HOSPITAL ENCOUNTER (OUTPATIENT)
Dept: RADIOLOGY | Facility: HOSPITAL | Age: 67
Discharge: HOME | End: 2017-04-25
Attending: NURSE PRACTITIONER
Payer: MEDICARE

## 2017-04-25 DIAGNOSIS — R10.30 LOWER ABDOMINAL PAIN: Primary | ICD-10-CM

## 2017-04-25 DIAGNOSIS — R79.89 ELEVATED LIVER FUNCTION TESTS: ICD-10-CM

## 2017-04-25 DIAGNOSIS — R10.32 LEFT LOWER QUADRANT PAIN: ICD-10-CM

## 2017-04-25 DIAGNOSIS — Z87.19 HISTORY OF DIVERTICULOSIS: ICD-10-CM

## 2017-04-25 DIAGNOSIS — Z87.19 HISTORY OF COLITIS: ICD-10-CM

## 2017-04-25 PROCEDURE — 74177 CT ABD & PELVIS W/CONTRAST: CPT | Mod: TC,PO

## 2017-04-25 PROCEDURE — 25500020 PHARM REV CODE 255: Mod: PO | Performed by: NURSE PRACTITIONER

## 2017-04-25 PROCEDURE — 74177 CT ABD & PELVIS W/CONTRAST: CPT | Mod: 26,,, | Performed by: RADIOLOGY

## 2017-04-25 RX ADMIN — IOHEXOL 30 ML: 350 INJECTION, SOLUTION INTRAVENOUS at 11:04

## 2017-04-25 RX ADMIN — IOHEXOL 75 ML: 350 INJECTION, SOLUTION INTRAVENOUS at 11:04

## 2017-04-28 ENCOUNTER — TELEPHONE (OUTPATIENT)
Dept: GASTROENTEROLOGY | Facility: CLINIC | Age: 67
End: 2017-04-28

## 2017-04-28 DIAGNOSIS — F40.240 CLAUSTROPHOBIA: ICD-10-CM

## 2017-04-28 DIAGNOSIS — R93.2 ABNORMAL GALL BLADDER DIAGNOSTIC IMAGING: ICD-10-CM

## 2017-04-28 DIAGNOSIS — R93.89 ABNORMAL FINDING ON CT SCAN: Primary | ICD-10-CM

## 2017-04-28 DIAGNOSIS — K76.9 LIVER LESION: ICD-10-CM

## 2017-04-28 DIAGNOSIS — R93.429 ABNORMAL CT SCAN, KIDNEY: ICD-10-CM

## 2017-04-28 NOTE — TELEPHONE ENCOUNTER
Discussed results with Dr. Luna; he reports since her last colonoscopy was in 6/2016 and had normal findings of the cecum, the questioned wall thickening to the cecum is likely peristalsis (Dr. Luna stated no need for repeat colonoscopy).   Moderate to large amount of stool in colon. Diverticulosis but no diverticulitis. Significant improvement since last ct scan to the inflammation surrounding the descending colon. Patient reports her abdominal pain has resolved.  Gallbladder with small fold/web.  Liver hypervascular focus noted which is nonspecific and likely relates to a hemangioma. Can confirm with MRI if patient desires or can monitor in repeat ultrasound/ct scan in 3-6 months.  Other findings are unchanged. Lung nodule unchanged since previous imaging, radiologist recommended repeat imaging in 1 year- recommend to follow-up with PCP for continued evaluation and management of this finding (former smoker).  I called and discussed the results and recommendations with the patient. She requested to have an ultrasound done and hold off on MRI for now. She requested I forward these results to her PCP Dr. Rivas.  Patient verbalized understanding and agreed with management plan. I addressed all of the patient's questions and concerns. I was unable to locate a Dr. Rivas in our system. Please clarify who she would like a copy of her ct scan sent to and forward it to the appropriate provider.

## 2017-04-28 NOTE — TELEPHONE ENCOUNTER
----- Message from Nichole Rod sent at 4/28/2017  1:40 PM CDT -----  Call pt at [phone]  Asking to speak to  Roberto Carlos

## 2017-04-28 NOTE — TELEPHONE ENCOUNTER
Patient needs to follow-up with PCP for continued evaluation and management of lung finding. Radiologist had recommended repeat lung imaging for surveillance in one year. You can talk to your PCP about this to see what he/she recommends.  I placed the order for the MRI of the abdomen.  Please verify that the patient does not have a pacemaker/defibrillator, metal implant, cerebral aneurysm or surgical clip, pump, middle or inner ear prosthetic, or nerve or brain stimulator, if so, please notify me so I can adjust the order accordingly. Please ask if patient is claustrophobic, if so, let me know.  Thanks  AYLIN

## 2017-04-28 NOTE — TELEPHONE ENCOUNTER
Pt would like MRI instead of ultrasound. Pt asks to have lung nodule looked at during MRI. Dr. Rivas phone number is [phone].

## 2017-05-01 ENCOUNTER — TELEPHONE (OUTPATIENT)
Dept: OBSTETRICS AND GYNECOLOGY | Facility: CLINIC | Age: 67
End: 2017-05-01

## 2017-05-01 PROBLEM — I25.84 CORONARY ARTERY DISEASE DUE TO CALCIFIED CORONARY LESION: Status: ACTIVE | Noted: 2017-05-01

## 2017-05-01 PROBLEM — E66.9 OBESITY: Status: ACTIVE | Noted: 2017-05-01

## 2017-05-01 PROBLEM — E78.00 HYPERCHOLESTEROLEMIA: Status: ACTIVE | Noted: 2017-05-01

## 2017-05-01 PROBLEM — I10 ESSENTIAL HYPERTENSION: Status: ACTIVE | Noted: 2017-05-01

## 2017-05-01 PROBLEM — E03.9 ACQUIRED HYPOTHYROIDISM: Status: ACTIVE | Noted: 2017-05-01

## 2017-05-01 PROBLEM — Z91.09 ENVIRONMENTAL ALLERGIES: Status: ACTIVE | Noted: 2017-05-01

## 2017-05-01 PROBLEM — I25.10 CORONARY ARTERY DISEASE DUE TO CALCIFIED CORONARY LESION: Status: ACTIVE | Noted: 2017-05-01

## 2017-05-01 RX ORDER — ALPRAZOLAM 0.25 MG/1
0.25 TABLET ORAL
Qty: 2 TABLET | Refills: 0 | Status: SHIPPED | OUTPATIENT
Start: 2017-05-01 | End: 2017-05-03 | Stop reason: SDUPTHER

## 2017-05-01 NOTE — TELEPHONE ENCOUNTER
I called the patient and informed her that the prescription for xanax sent to pharmacy on file. Advised patient not to take ativan while taking xanax (these medication act similarly). Advised patient to have someone drive her for the scan. Patient verbalized understanding. I addressed all of the patient's questions and concerns.  AYLIN

## 2017-05-01 NOTE — TELEPHONE ENCOUNTER
Schedule pt for MRI on Wednesday. Pt request Zanax for MRI. Pt would like to speak to JOSEPH Azevedo regarding questions about gall bladder.

## 2017-05-01 NOTE — TELEPHONE ENCOUNTER
----- Message from Melonie Starr sent at 5/1/2017  1:12 PM CDT -----  Contact: self   Patient wants to speak with a nurse regarding xanex before MRI please call back at [phone]

## 2017-05-01 NOTE — TELEPHONE ENCOUNTER
----- Message from Deon Hughes sent at 5/1/2017 11:16 AM CDT -----  Pt called stated that she has some questions about the surgery and would like a call back about it/pls call back at [phone]

## 2017-05-03 ENCOUNTER — TELEPHONE (OUTPATIENT)
Dept: GASTROENTEROLOGY | Facility: CLINIC | Age: 67
End: 2017-05-03

## 2017-05-03 DIAGNOSIS — F40.240 CLAUSTROPHOBIA: ICD-10-CM

## 2017-05-03 RX ORDER — ALPRAZOLAM 0.25 MG/1
0.25 TABLET ORAL
Qty: 1 TABLET | Refills: 0 | Status: SHIPPED | OUTPATIENT
Start: 2017-05-03 | End: 2018-07-22

## 2017-05-03 NOTE — TELEPHONE ENCOUNTER
I had prescribed 2 tablets. She can use the second one, if patient took as prescribed then she should have one left. I will send in one more prescription so she can have one more tablet available for her to use for the MRI.  Thanks  AYLIN

## 2017-05-03 NOTE — TELEPHONE ENCOUNTER
----- Message from René Aguillon LPN sent at 5/3/2017  1:17 PM CDT -----   Please advise  ----- Message -----     From: Dolores Frey     Sent: 5/3/2017  12:47 PM       To: Roberto Carlos [facility] Staff    Patient showed up today for an MRI, it was scheduled for 5/10. She went ahead and took the xanex today. She is requesting more for the actual appointment on 5/10. Please call patient eliud at [phone]. Thank you.

## 2017-05-10 ENCOUNTER — HOSPITAL ENCOUNTER (OUTPATIENT)
Dept: RADIOLOGY | Facility: HOSPITAL | Age: 67
Discharge: HOME | End: 2017-05-10
Attending: NURSE PRACTITIONER
Payer: MEDICARE

## 2017-05-10 DIAGNOSIS — R93.429 ABNORMAL CT SCAN, KIDNEY: ICD-10-CM

## 2017-05-10 DIAGNOSIS — R93.89 ABNORMAL FINDING ON CT SCAN: ICD-10-CM

## 2017-05-10 DIAGNOSIS — R93.2 ABNORMAL GALL BLADDER DIAGNOSTIC IMAGING: ICD-10-CM

## 2017-05-10 DIAGNOSIS — K76.9 LIVER LESION: ICD-10-CM

## 2017-05-10 PROCEDURE — 25500020 PHARM REV CODE 255: Mod: PO | Performed by: NURSE PRACTITIONER

## 2017-05-10 PROCEDURE — A9585 GADOBUTROL INJECTION: HCPCS | Mod: PO | Performed by: NURSE PRACTITIONER

## 2017-05-10 PROCEDURE — 74183 MRI ABD W/O CNTR FLWD CNTR: CPT | Mod: 26,,, | Performed by: RADIOLOGY

## 2017-05-10 PROCEDURE — 74183 MRI ABD W/O CNTR FLWD CNTR: CPT | Mod: TC,PO

## 2017-05-10 RX ORDER — GADOBUTROL 604.72 MG/ML
7 INJECTION INTRAVENOUS
Status: COMPLETED | OUTPATIENT
Start: 2017-05-10 | End: 2017-05-10

## 2017-05-10 RX ADMIN — GADOBUTROL 7 ML: 604.72 INJECTION INTRAVENOUS at 02:05

## 2017-05-11 ENCOUNTER — TELEPHONE (OUTPATIENT)
Dept: GASTROENTEROLOGY | Facility: CLINIC | Age: 67
End: 2017-05-11

## 2017-05-11 NOTE — TELEPHONE ENCOUNTER
Please call to inform & review the results with the patient- MRI showed a nonspecific right liver nodule which may represent an atypical hemangioma (collection of blood vessels that are typically benign). The radiologist recommend further imaging with ultrasound (as previously ordered prior to MRI) and possible repeat in 6 months.  Otherwise normal findings.  Thanks,  Lesvia MATHEW FNP-C

## 2017-05-15 ENCOUNTER — OFFICE VISIT (OUTPATIENT)
Dept: OBSTETRICS AND GYNECOLOGY | Facility: CLINIC | Age: 67
End: 2017-05-15
Payer: MEDICARE

## 2017-05-15 VITALS
BODY MASS INDEX: 34.2 KG/M2 | HEIGHT: 60 IN | WEIGHT: 174.19 LBS | DIASTOLIC BLOOD PRESSURE: 84 MMHG | SYSTOLIC BLOOD PRESSURE: 128 MMHG

## 2017-05-15 DIAGNOSIS — N81.6 RECTOCELE: Primary | ICD-10-CM

## 2017-05-15 PROCEDURE — 99024 POSTOP FOLLOW-UP VISIT: CPT | Mod: S$GLB,,, | Performed by: OBSTETRICS & GYNECOLOGY

## 2017-05-15 PROCEDURE — 99999 PR PBB SHADOW E&M-EST. PATIENT-LVL III: CPT | Mod: PBBFAC,,, | Performed by: OBSTETRICS & GYNECOLOGY
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
## 2017-05-16 ENCOUNTER — TELEPHONE (OUTPATIENT)
Dept: OBSTETRICS AND GYNECOLOGY | Facility: CLINIC | Age: 67
End: 2017-05-16

## 2017-05-16 RX ORDER — FLUCONAZOLE 150 MG/1
150 TABLET ORAL ONCE
Qty: 1 TABLET | Refills: 0 | Status: SHIPPED | OUTPATIENT
Start: 2017-05-16 | End: 2017-05-16

## 2017-05-16 NOTE — TELEPHONE ENCOUNTER
----- Message from Marie Ferguson sent at 5/16/2017  9:48 AM CDT -----  Contact: Patient  Patient has several questions before surgery on Monday. Yeast infection between her legs. Please call at [phone] discuss several issues.

## 2017-05-16 NOTE — TELEPHONE ENCOUNTER
Pt states she has a rash in groin area and upper thighs thinks its yeast. Wants to know if she should take a diflucan or use a cream before surgery. Allergies and pharmacy reviewed.

## 2017-05-17 ENCOUNTER — TELEPHONE (OUTPATIENT)
Dept: GASTROENTEROLOGY | Facility: CLINIC | Age: 67
End: 2017-05-17

## 2017-05-17 NOTE — TELEPHONE ENCOUNTER
----- Message from Stefany Menjivar sent at 5/17/2017  1:14 PM CDT -----  Contact:  call //[phone]    Calling   For  Test  Results  // please call  After 2:30

## 2017-05-19 ENCOUNTER — TELEPHONE (OUTPATIENT)
Dept: OBSTETRICS AND GYNECOLOGY | Facility: CLINIC | Age: 67
End: 2017-05-19

## 2017-05-19 NOTE — TELEPHONE ENCOUNTER
----- Message from Margarita Perdue sent at 5/19/2017 12:11 PM CDT -----  Contact: self [phone]  Please call her, she has questions regarding her surgery.  Thank you!

## 2017-05-22 ENCOUNTER — DOCUMENTATION ONLY (OUTPATIENT)
Dept: OBSTETRICS AND GYNECOLOGY | Facility: CLINIC | Age: 67
End: 2017-05-22

## 2017-05-22 NOTE — PROGRESS NOTES
OPERATIVE REPORT:    SURGEON: Emerald Coronel M.D.    ASSISTANT: None    PREOPERATIVE DIAGNOSIS:   1. Rectocele    POSTOPERATIVE DIAGNOSIS:   1. Rectocele    OPERATION:   1. Posterior Vaginal Colporrhaphy     ANESTHESIA: General.    ESTIMATED BLOOD LOSS: 50cc    FLUIDS: 1200 mL of crystalloid.    URINE OUTPUT: Adequate pre and post procedure.     FINDINGS: Grade 3 prolapse     PROCEDURE IN DETAIL:   The patient was taken to the operating room where general endotracheal anesthesia was found to be adequate. The patient was prepped and draped in the usual sterile fashion in a dorsal lithotomy position in the [facility]. Phillips catheter was placed. Weighted speculum was introduced into the vagina and the apex of the vaginal cuff was grasped with an Allis clamp. The posterior vaginal mucosa was injected with dilute vasopressin to facilitate dissection and a 5cm vertical incision was made in the most dependent portion of the rectocele. Metzenbaum scissors were used to dissect off the vaginal mucosa out to the obturator muscles bilaterally.  The vaginal prolapse was repaired with 2-0 Vicryl in interrupted fashion.  The vaginal mucosa was re approximated with a running interlocking suture of 0 vicryl. The vagina was packed with Premarin soaked packing [place]. Phillips was in placed. Counts correct x 2 and there were no complications. Patient was taken out of the [facility], awaken from anesthesia, extubated, and doing well on her way to the recovery room.

## 2017-05-23 ENCOUNTER — TELEPHONE (OUTPATIENT)
Dept: OBSTETRICS AND GYNECOLOGY | Facility: CLINIC | Age: 67
End: 2017-05-23

## 2017-05-23 RX ORDER — IBUPROFEN 800 MG/1
TABLET ORAL
Qty: 30 TABLET | Refills: 0 | Status: SHIPPED | OUTPATIENT
Start: 2017-05-23 | End: 2017-11-01

## 2017-05-23 NOTE — TELEPHONE ENCOUNTER
----- Message from Nette Hansen sent at 5/23/2017 11:32 AM CDT -----  Contact: Patient  Patient was in the hospital and is to schedule a 2 week Hospital Follow Up.  The next available appointment is not until July.  Can you please call the patient back at [phone].  Thank you

## 2017-05-24 ENCOUNTER — TELEPHONE (OUTPATIENT)
Dept: OBSTETRICS AND GYNECOLOGY | Facility: CLINIC | Age: 67
End: 2017-05-24

## 2017-05-24 NOTE — TELEPHONE ENCOUNTER
----- Message from Jeromy Jaffe sent at 5/24/2017 10:31 AM CDT -----  Contact: pt  Pt is requesting a callback  Call Back#[phone]  Thanks

## 2017-05-24 NOTE — TELEPHONE ENCOUNTER
Pt wanted to let you know he BP has been elevated 160's/80's for the last 24 hours.   She has doubled her dose of losartan and hasnt rechecked yet.

## 2017-05-24 NOTE — TELEPHONE ENCOUNTER
Patient states she cannot have a BM she has been trying for an hour and her pain is a 10/10. She says it feels like its stuck at the opening. She has taken miralax and ducolax. Please advise.

## 2017-05-24 NOTE — TELEPHONE ENCOUNTER
----- Message from Neela Donald sent at 5/24/2017  3:10 PM CDT -----  Patient states that she need to speak to you as soon as possible stating that she cannot have a bowel movement.  Call patient at [phone].

## 2017-05-31 ENCOUNTER — TELEPHONE (OUTPATIENT)
Dept: OBSTETRICS AND GYNECOLOGY | Facility: CLINIC | Age: 67
End: 2017-05-31

## 2017-05-31 ENCOUNTER — TELEPHONE (OUTPATIENT)
Dept: OTOLARYNGOLOGY | Facility: CLINIC | Age: 67
End: 2017-05-31

## 2017-05-31 NOTE — TELEPHONE ENCOUNTER
----- Message from Lexy Richter sent at 5/31/2017 10:04 AM CDT -----  Patient is experiencing Vertigo and feels like something in right ear/would like to be seen today /please call back at [phone] to schedule or advise.

## 2017-05-31 NOTE — TELEPHONE ENCOUNTER
----- Message from Robyn Adhikari sent at 5/31/2017  9:30 AM CDT -----  Contact: self  Had surgery on 5/22 and she has some questions.  Please call back at [phone] (home)

## 2017-06-06 ENCOUNTER — OFFICE VISIT (OUTPATIENT)
Dept: OBSTETRICS AND GYNECOLOGY | Facility: CLINIC | Age: 67
End: 2017-06-06
Payer: MEDICARE

## 2017-06-06 VITALS — BODY MASS INDEX: 33.67 KG/M2 | WEIGHT: 169.56 LBS

## 2017-06-06 DIAGNOSIS — Z09 POSTOP CHECK: Primary | ICD-10-CM

## 2017-06-06 DIAGNOSIS — R42 DIZZY: Primary | ICD-10-CM

## 2017-06-06 PROCEDURE — 99999 PR PBB SHADOW E&M-EST. PATIENT-LVL II: CPT | Mod: PBBFAC,,, | Performed by: OBSTETRICS & GYNECOLOGY

## 2017-06-06 PROCEDURE — 99024 POSTOP FOLLOW-UP VISIT: CPT | Mod: S$GLB,,, | Performed by: OBSTETRICS & GYNECOLOGY

## 2017-06-06 RX ORDER — HYDROCODONE BITARTRATE AND ACETAMINOPHEN 5; 325 MG/1; MG/1
TABLET ORAL
Refills: 0 | COMMUNITY
Start: 2017-05-23 | End: 2017-11-01

## 2017-06-06 RX ORDER — IPRATROPIUM BROMIDE 42 UG/1
SPRAY, METERED NASAL
Refills: 0 | COMMUNITY
Start: 2017-05-26 | End: 2018-07-17
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
## 2017-06-07 ENCOUNTER — OFFICE VISIT (OUTPATIENT)
Dept: OTOLARYNGOLOGY | Facility: CLINIC | Age: 67
End: 2017-06-07
Payer: MEDICARE

## 2017-06-07 ENCOUNTER — CLINICAL SUPPORT (OUTPATIENT)
Dept: AUDIOLOGY | Facility: CLINIC | Age: 67
End: 2017-06-07
Payer: MEDICARE

## 2017-06-07 VITALS
DIASTOLIC BLOOD PRESSURE: 69 MMHG | HEIGHT: 59 IN | HEART RATE: 68 BPM | WEIGHT: 168.88 LBS | BODY MASS INDEX: 34.04 KG/M2 | SYSTOLIC BLOOD PRESSURE: 136 MMHG

## 2017-06-07 DIAGNOSIS — H81.12 BENIGN PAROXYSMAL POSITIONAL VERTIGO, LEFT: Primary | ICD-10-CM

## 2017-06-07 DIAGNOSIS — H81.12 BPPV (BENIGN PAROXYSMAL POSITIONAL VERTIGO), LEFT: Primary | ICD-10-CM

## 2017-06-07 PROCEDURE — 1159F MED LIST DOCD IN RCRD: CPT | Mod: S$GLB,,, | Performed by: NURSE PRACTITIONER

## 2017-06-07 PROCEDURE — 1126F AMNT PAIN NOTED NONE PRSNT: CPT | Mod: S$GLB,,, | Performed by: NURSE PRACTITIONER

## 2017-06-07 PROCEDURE — 99999 PR PBB SHADOW E&M-EST. PATIENT-LVL IV: CPT | Mod: PBBFAC,,, | Performed by: NURSE PRACTITIONER

## 2017-06-07 PROCEDURE — 95992 CANALITH REPOSITIONING PROC: CPT | Mod: S$GLB,,, | Performed by: NURSE PRACTITIONER

## 2017-06-07 PROCEDURE — 99213 OFFICE O/P EST LOW 20 MIN: CPT | Mod: 25,S$GLB,, | Performed by: NURSE PRACTITIONER

## 2017-06-07 PROCEDURE — 99999 PR PBB SHADOW E&M-EST. PATIENT-LVL I: CPT | Mod: PBBFAC,,,

## 2017-06-07 NOTE — PROGRESS NOTES
"[place]-Hallpike maneuver performed per order from  Meredith Gonsalves due to patient's history of BPPV (right side May 2016) and report that the same symptoms have returned.  Pt reported that her left side is the "bad side" this time and that her vertigo symptoms began one to two weeks ago.     Galen Hallpike Maneuver performed to determine presence of BPPV.    Nystagmus was visualized in AS down position.   Impression: BPPV AS    Plan:  Canalith repositioning performed by ENT provider and audiologist.  Post care instructions explained to patient.  RTC in one week for recheck.  Call office for any questions/concerns.    "

## 2017-06-07 NOTE — PROGRESS NOTES
"Subjective:       Patient ID: Herminia Wooten is a 66 y.o. female.    Chief Complaint: Dizziness    HPI  Patient treated for BPPV AD on 4/14/13 and 5/11/16.  She states dizziness returned 1-2 weeks ago. Spinning occurs when she looks up or down but especially if she goes onto her left side. Her right ear is "bothering" her, feels like there is something in it, but no otalgia or otorrhea.      Review of Systems   Constitutional: Negative.  Negative for fever and fatigue.   HENT:  Negative for ear pain, facial swelling, neck pain and ear discharge.    Eyes: Negative.    Respiratory: Negative.  Negative for cough, shortness of breath and wheezing.    Cardiovascular: Negative.    Gastrointestinal: Negative.    Musculoskeletal: Negative.    Skin: Negative.  Negative for pallor and rash.   Neurological: Negative.    Psychiatric/Behavioral: Negative.        Objective:      Physical Exam   Nursing note and vitals reviewed.  Constitutional: She is oriented to person, place, and time. Vital signs are normal. She appears well-developed and well-nourished. She is cooperative. She does not appear ill. No distress.   HENT:   Head: Normocephalic and atraumatic.   Right Ear: Tympanic membrane, external ear and ear canal normal. No drainage. Tympanic membrane is not erythematous. No middle ear effusion.   Left Ear: Tympanic membrane, external ear and ear canal normal. No drainage. Tympanic membrane is not erythematous.  No middle ear effusion.   Nose: Nasal vestibule with erythema and scaling. No mucosal edema, rhinorrhea or septal deviation. Right sinus exhibits no maxillary sinus tenderness and no frontal sinus tenderness. Left sinus exhibits no maxillary sinus tenderness and no frontal sinus tenderness.   Mouth/Throat: Uvula is midline, oropharynx is clear and moist and mucous membranes are normal. Mucous membranes are not pale, not dry and not cyanotic. No oral lesions. No posterior oropharyngeal edema or posterior oropharyngeal " erythema.   Eyes: Conjunctivae are normal. Pupils are equal, round, and reactive to light. Right eye exhibits no discharge. Left eye exhibits no discharge. No scleral icterus.   Neck: Trachea normal and normal range of motion. Neck supple. No tracheal deviation present. No mass and no thyromegaly present.   Pulmonary/Chest: Effort normal. No respiratory distress. She has no wheezes.   Musculoskeletal: Normal range of motion.   Lymphadenopathy:        Head (right side): No submental, no preauricular and no posterior auricular adenopathy present.        Head (left side): No submental, no preauricular and no posterior auricular adenopathy present.     She has no cervical adenopathy.   Neurological: She is alert and oriented to person, place, and time.   Skin: Skin is warm, dry and intact. No lesion and no rash noted. She is not diaphoretic. No erythema. No pallor.   Psychiatric: She has a normal mood and affect. Her speech is normal and behavior is normal. Judgment and thought content normal. Cognition and memory are normal.     Positive [place]-Hallpike AS  Assessment:     BPPV AS   Chronic rhinitis  Plan:     Continue nasal sprays and ALLERGY regimen     SEPARATE PROCEDURE: EPLEY CANALITH REPOSITIONING  Pre Procedure Diagnosis:  Benign Paroxysmal Positional Vertigo Left   Post Procedure Diagnosis: Same  Consent: Verbal with patient and family members if present  Anesthesia: None  Performed by:  provider with audiologist assisting   Procedure in detail:   After pre procedure examination including appropriate audiometric testing and Galen Hallpike,  the patient was determined to have Benign Paroxysmal Positional Vertigo.  After discussion of the treatment options including but not necessarily limited to  observation, trial of medications, possible need for more than one repositioning and the possibilty of incomplete resolution or recurrence of BPPV and answering any questions,  the patient verbally consented to proceed with  canalith repositioning.    Patient underwent Epley canalith repositioning   according to standard protocol as follows:  Pt was brought into Supine position with affected ear the left ear facing down.  Pt remained in this position until nystagmus resolved.    Pt's head was then rotated to the unaffected side the right ear  facing down.   Pt remained in this position for 30 seconds to one minute.  Pt was then rolled onto lateral decubitus position of the unaffected side.  Head was rotated until nose and chin were angled toward the floor.   Pt remained in this position for 30 seconds to one minute.  Pt was then brought up to a seated position facing unaffected side.    Tolerated well without complications or intolerances.    Patient condition post procedure: Good.    Post procedure instructions to prevent recurrence of BPPV were given in writing and reviewed in detail by audiologist as follows:   Pt advised to sleep reclined at 45 degrees for at least two nights.  Pt strongly encouraged NOT to sleep on left side for one week.  Pt instructed to refrain from bending forward or tilting head back.   Follow up one week for therapeutic benefit, repeat diagnostic testing ([place] Hallpike) and repeat Epley Canalith Repositioning if appropriate.

## 2017-06-08 ENCOUNTER — TELEPHONE (OUTPATIENT)
Dept: OBSTETRICS AND GYNECOLOGY | Facility: CLINIC | Age: 67
End: 2017-06-08

## 2017-06-08 NOTE — TELEPHONE ENCOUNTER
When can pt resume PT. She is also asking when vaginal spotting should be expected to stop. Please advise.

## 2017-06-08 NOTE — TELEPHONE ENCOUNTER
----- Message from Kishore Varma sent at 6/8/2017  1:44 PM CDT -----  Contact: Herminia  Please call back to advise when her bleeding should stop and if she can start physical therapy on 06/26? Please call back [phone] (home)   Thanks!

## 2017-06-14 ENCOUNTER — TELEPHONE (OUTPATIENT)
Dept: OBSTETRICS AND GYNECOLOGY | Facility: CLINIC | Age: 67
End: 2017-06-14

## 2017-06-14 NOTE — TELEPHONE ENCOUNTER
----- Message from Cee Castanon sent at 6/14/2017 10:31 AM CDT -----  Contact: thomas   Medication questions  Sore spot on throat   Post procedure questions  Call back

## 2017-06-14 NOTE — TELEPHONE ENCOUNTER
Advised pt she may use Estrace cream vaginally.  Advised her to treat sore throat with cough drops or chloraseptic spray, see PCP if it worsens.

## 2017-06-27 ENCOUNTER — TELEPHONE (OUTPATIENT)
Dept: OBSTETRICS AND GYNECOLOGY | Facility: CLINIC | Age: 67
End: 2017-06-27

## 2017-06-27 NOTE — TELEPHONE ENCOUNTER
----- Message from Adriane Ibrahim sent at 6/27/2017 11:28 AM CDT -----  Contact: pt  Questions regarding procedure had  Call back on # [phone]  thanks

## 2017-06-27 NOTE — TELEPHONE ENCOUNTER
Spoke with pt and advised her that she may not return to PT until cleared at her next post op visit.

## 2017-07-05 ENCOUNTER — OFFICE VISIT (OUTPATIENT)
Dept: OBSTETRICS AND GYNECOLOGY | Facility: CLINIC | Age: 67
End: 2017-07-05
Payer: MEDICARE

## 2017-07-05 VITALS — WEIGHT: 169.31 LBS | BODY MASS INDEX: 34.2 KG/M2

## 2017-07-05 DIAGNOSIS — Z09 POSTOP CHECK: Primary | ICD-10-CM

## 2017-07-05 PROCEDURE — 99024 POSTOP FOLLOW-UP VISIT: CPT | Mod: S$GLB,,, | Performed by: OBSTETRICS & GYNECOLOGY

## 2017-07-05 PROCEDURE — 99999 PR PBB SHADOW E&M-EST. PATIENT-LVL II: CPT | Mod: PBBFAC,,, | Performed by: OBSTETRICS & GYNECOLOGY
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
## 2017-07-10 ENCOUNTER — TELEPHONE (OUTPATIENT)
Dept: OBSTETRICS AND GYNECOLOGY | Facility: CLINIC | Age: 67
End: 2017-07-10

## 2017-07-10 NOTE — TELEPHONE ENCOUNTER
----- Message from Rylie Resendiz sent at 7/10/2017  9:55 AM CDT -----  Contact: self  Patient [phone] is calling to say that she had surgery about 8 weeks ago and when she had intercourse last night she started bleeding and is still spotting this morning/please call patient to discuss

## 2017-07-24 ENCOUNTER — TELEPHONE (OUTPATIENT)
Dept: OBSTETRICS AND GYNECOLOGY | Facility: CLINIC | Age: 67
End: 2017-07-24

## 2017-07-24 NOTE — TELEPHONE ENCOUNTER
----- Message from Luciana Steel sent at 7/24/2017 10:39 AM CDT -----  Contact: pt [phone]  Patient called and asked for a call back she had surgery on May 22 she states she has some soreness after [place] and she is concerned.

## 2017-08-10 ENCOUNTER — TELEPHONE (OUTPATIENT)
Dept: OBSTETRICS AND GYNECOLOGY | Facility: CLINIC | Age: 67
End: 2017-08-10

## 2017-08-10 NOTE — TELEPHONE ENCOUNTER
----- Message from RT Coleen sent at 8/10/2017  9:46 AM CDT -----  Contact: pt    pt , requesting a call back soon she would like to explain problems she is having post sx, thanks.

## 2017-08-10 NOTE — TELEPHONE ENCOUNTER
Patient is still having difficulty with BM, she has to use a finger to have a BM. She is not taking a stool softener. She has pain and difficulty even when she is not constipated. Please advise.

## 2017-08-14 ENCOUNTER — TELEPHONE (OUTPATIENT)
Dept: OBSTETRICS AND GYNECOLOGY | Facility: CLINIC | Age: 67
End: 2017-08-14

## 2017-08-14 NOTE — TELEPHONE ENCOUNTER
----- Message from Kendra Gresham sent at 8/14/2017 11:35 AM CDT -----  Contact: Patient  Patient called requesting the Wedneday, 8/16/17, at 10:00 AM appointment that was previously offered to her. She advised that she wasn't able to pull up her schedule, but she has it now.  Unfortunately, I was not able to see any available appointments on 8/16/17.  Please call patient back at [phone] to confirm. Thank you!

## 2017-08-14 NOTE — TELEPHONE ENCOUNTER
----- Message from Melonie Starr sent at 8/14/2017 11:16 AM CDT -----  Contact: self   Patient request order for ultra sound on breast, any questions please call back at [phone]

## 2017-08-16 ENCOUNTER — OFFICE VISIT (OUTPATIENT)
Dept: OBSTETRICS AND GYNECOLOGY | Facility: CLINIC | Age: 67
End: 2017-08-16
Payer: MEDICARE

## 2017-08-16 VITALS
SYSTOLIC BLOOD PRESSURE: 130 MMHG | DIASTOLIC BLOOD PRESSURE: 78 MMHG | WEIGHT: 169.06 LBS | BODY MASS INDEX: 34.08 KG/M2 | HEIGHT: 59 IN

## 2017-08-16 DIAGNOSIS — N64.4 BREAST PAIN, LEFT: ICD-10-CM

## 2017-08-16 DIAGNOSIS — N94.10 DYSPAREUNIA IN FEMALE: Primary | ICD-10-CM

## 2017-08-16 PROCEDURE — 3075F SYST BP GE 130 - 139MM HG: CPT | Mod: S$GLB,,, | Performed by: OBSTETRICS & GYNECOLOGY

## 2017-08-16 PROCEDURE — 3008F BODY MASS INDEX DOCD: CPT | Mod: S$GLB,,, | Performed by: OBSTETRICS & GYNECOLOGY

## 2017-08-16 PROCEDURE — 99213 OFFICE O/P EST LOW 20 MIN: CPT | Mod: 24,S$GLB,, | Performed by: OBSTETRICS & GYNECOLOGY

## 2017-08-16 PROCEDURE — 3078F DIAST BP <80 MM HG: CPT | Mod: S$GLB,,, | Performed by: OBSTETRICS & GYNECOLOGY

## 2017-08-16 PROCEDURE — 1126F AMNT PAIN NOTED NONE PRSNT: CPT | Mod: S$GLB,,, | Performed by: OBSTETRICS & GYNECOLOGY

## 2017-08-16 PROCEDURE — 1159F MED LIST DOCD IN RCRD: CPT | Mod: S$GLB,,, | Performed by: OBSTETRICS & GYNECOLOGY

## 2017-08-16 PROCEDURE — 99999 PR PBB SHADOW E&M-EST. PATIENT-LVL III: CPT | Mod: PBBFAC,,, | Performed by: OBSTETRICS & GYNECOLOGY
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
## 2017-08-21 ENCOUNTER — HOSPITAL ENCOUNTER (OUTPATIENT)
Dept: RADIOLOGY | Facility: HOSPITAL | Age: 67
Discharge: HOME | End: 2017-08-21
Attending: OBSTETRICS & GYNECOLOGY
Payer: MEDICARE

## 2017-08-21 DIAGNOSIS — N64.4 BREAST PAIN, LEFT: ICD-10-CM

## 2017-08-21 PROCEDURE — 77062 BREAST TOMOSYNTHESIS BI: CPT | Mod: 26,,, | Performed by: RADIOLOGY

## 2017-08-21 PROCEDURE — 77062 BREAST TOMOSYNTHESIS BI: CPT | Mod: TC

## 2017-08-21 PROCEDURE — 77066 DX MAMMO INCL CAD BI: CPT | Mod: 26,,, | Performed by: RADIOLOGY

## 2017-09-27 ENCOUNTER — TELEPHONE (OUTPATIENT)
Dept: OBSTETRICS AND GYNECOLOGY | Facility: CLINIC | Age: 67
End: 2017-09-27

## 2017-09-27 NOTE — TELEPHONE ENCOUNTER
----- Message from Stefany Menjivar sent at 9/27/2017 11:23 AM CDT -----    Call [phone]   To  Speak to  The pt about  ordering   dulflucan  // please call   [facility] [id number] [facility] [address] [facility] [address] [facility] [address]  [address] [address] [address]  Phone: [phone] Fax: [phone]

## 2017-09-27 NOTE — TELEPHONE ENCOUNTER
Called pt and left voicemail notifying her that she will need appt for testing before Dr. Coronel will send prosperan.

## 2017-10-24 ENCOUNTER — HOSPITAL ENCOUNTER (OUTPATIENT)
Dept: RADIOLOGY | Facility: HOSPITAL | Age: 67
Discharge: HOME | End: 2017-10-24
Attending: ORTHOPAEDIC SURGERY
Payer: MEDICARE

## 2017-10-24 DIAGNOSIS — S83.207D TEAR OF MENISCUS OF LEFT KNEE, SUBSEQUENT ENCOUNTER: ICD-10-CM

## 2017-10-24 PROCEDURE — 73721 MRI JNT OF LWR EXTRE W/O DYE: CPT | Mod: TC,PO,LT

## 2017-10-24 PROCEDURE — 73721 MRI JNT OF LWR EXTRE W/O DYE: CPT | Mod: 26,LT,, | Performed by: RADIOLOGY

## 2017-11-01 ENCOUNTER — LAB VISIT (OUTPATIENT)
Dept: LAB | Facility: HOSPITAL | Age: 67
End: 2017-11-01
Attending: NURSE PRACTITIONER
Payer: MEDICARE

## 2017-11-01 ENCOUNTER — TELEPHONE (OUTPATIENT)
Dept: GASTROENTEROLOGY | Facility: CLINIC | Age: 67
End: 2017-11-01

## 2017-11-01 DIAGNOSIS — E78.00 HYPERCHOLESTEROLEMIA: ICD-10-CM

## 2017-11-01 DIAGNOSIS — I10 ESSENTIAL HYPERTENSION: ICD-10-CM

## 2017-11-01 DIAGNOSIS — I25.84 CORONARY ARTERY DISEASE DUE TO CALCIFIED CORONARY LESION: ICD-10-CM

## 2017-11-01 DIAGNOSIS — I25.10 CORONARY ARTERY DISEASE DUE TO CALCIFIED CORONARY LESION: ICD-10-CM

## 2017-11-01 LAB
ALBUMIN SERPL BCP-MCNC: 3.9 G/DL
ALP SERPL-CCNC: 124 U/L
ALT SERPL W/O P-5'-P-CCNC: 32 U/L
ANION GAP SERPL CALC-SCNC: 7 MMOL/L
AST SERPL-CCNC: 17 U/L
BILIRUB SERPL-MCNC: 0.5 MG/DL
BUN SERPL-MCNC: 16 MG/DL
CALCIUM SERPL-MCNC: 9.7 MG/DL
CHLORIDE SERPL-SCNC: 104 MMOL/L
CHOLEST SERPL-MCNC: 166 MG/DL
CHOLEST/HDLC SERPL: 3.5 {RATIO}
CK SERPL-CCNC: 130 U/L
CO2 SERPL-SCNC: 29 MMOL/L
CREAT SERPL-MCNC: 0.9 MG/DL
ERYTHROCYTE [DISTWIDTH] IN BLOOD BY AUTOMATED COUNT: 13.5 %
EST. GFR  (AFRICAN AMERICAN): >60 ML/MIN/1.73 M^2
EST. GFR  (NON AFRICAN AMERICAN): >60 ML/MIN/1.73 M^2
GLUCOSE SERPL-MCNC: 96 MG/DL
HCT VFR BLD AUTO: 45.9 %
HDLC SERPL-MCNC: 48 MG/DL
HDLC SERPL: 28.9 %
HGB BLD-MCNC: 14.3 G/DL
LDLC SERPL CALC-MCNC: 81.8 MG/DL
MCH RBC QN AUTO: 28.1 PG
MCHC RBC AUTO-ENTMCNC: 31.2 G/DL
MCV RBC AUTO: 90 FL
NONHDLC SERPL-MCNC: 118 MG/DL
PLATELET # BLD AUTO: 252 K/UL
PMV BLD AUTO: 11.6 FL
POTASSIUM SERPL-SCNC: 4.8 MMOL/L
PROT SERPL-MCNC: 7.2 G/DL
RBC # BLD AUTO: 5.09 M/UL
SODIUM SERPL-SCNC: 140 MMOL/L
T4 FREE SERPL-MCNC: 0.76 NG/DL
TRIGL SERPL-MCNC: 181 MG/DL
TSH SERPL DL<=0.005 MIU/L-ACNC: 6.24 UIU/ML
WBC # BLD AUTO: 13.13 K/UL

## 2017-11-01 PROCEDURE — 84439 ASSAY OF FREE THYROXINE: CPT

## 2017-11-01 PROCEDURE — 36415 COLL VENOUS BLD VENIPUNCTURE: CPT | Mod: PO

## 2017-11-01 PROCEDURE — 80053 COMPREHEN METABOLIC PANEL: CPT

## 2017-11-01 PROCEDURE — 85027 COMPLETE CBC AUTOMATED: CPT

## 2017-11-01 PROCEDURE — 84443 ASSAY THYROID STIM HORMONE: CPT

## 2017-11-01 PROCEDURE — 82550 ASSAY OF CK (CPK): CPT

## 2017-11-01 PROCEDURE — 80061 LIPID PANEL: CPT

## 2017-11-01 NOTE — TELEPHONE ENCOUNTER
Pt last seen Marcf 2017/had MRI. Was recommended that MRI be repeated in 6 months. Pt asking should this be done? If so does she need office visit first or can MRI be ordered?

## 2017-11-01 NOTE — TELEPHONE ENCOUNTER
I reviewed the previous imaging studies and the radiologist had recommended abdominal ultrasound to further evaluate the finding. The abdominal ultrasound order is in the computer already. Please schedule patient for this. No office visit is need if she is asymptomatic and we are just monitoring previous imaging finding(s).  Thanks  AYLIN

## 2017-11-01 NOTE — TELEPHONE ENCOUNTER
----- Message from Ceci Moreau sent at 11/1/2017 10:44 AM CDT -----  Contact: Patient  Patient is calling for a six month follow-up on the MRI and also trying to see what other tests doctor wants patient to take.  Call Back#[phone]  Thanks

## 2017-11-03 ENCOUNTER — OFFICE VISIT (OUTPATIENT)
Dept: OPTOMETRY | Facility: CLINIC | Age: 67
End: 2017-11-03
Payer: MEDICARE

## 2017-11-03 DIAGNOSIS — H16.219 EXPOSURE KERATOCONJUNCTIVITIS, UNSPECIFIED LATERALITY: Primary | ICD-10-CM

## 2017-11-03 DIAGNOSIS — H11.442 CONJUNCTIVAL CYST OF LEFT EYE: ICD-10-CM

## 2017-11-03 PROCEDURE — 99999 PR PBB SHADOW E&M-EST. PATIENT-LVL IV: CPT | Mod: PBBFAC,,, | Performed by: OPTOMETRIST

## 2017-11-03 PROCEDURE — 92012 INTRM OPH EXAM EST PATIENT: CPT | Mod: S$GLB,,, | Performed by: OPTOMETRIST

## 2017-11-03 NOTE — PROGRESS NOTES
HPI     Dry Eye    Additional comments: +Systane gtts            Eye Problem    Additional comments: pt noticed small blisters on OS conj x few days,   slight irritation -- no discharge           Itchy Eye    Additional comments: some itching OS           Comments   2-3 day h/o raised blister on OS conj  No pain  H/o dry eye / lagophthalmos         Last edited by RAJANI Melendrez, OD on 11/3/2017  9:35 AM. (History)        ROS     Positive for: Eyes    Negative for: Constitutional, Gastrointestinal, Neurological, Skin,   Genitourinary, Musculoskeletal, HENT, Endocrine, Cardiovascular,   Respiratory, Psychiatric, Allergic/Imm, Heme/Lymph    Last edited by RAJANI Melendrez, OD on 11/3/2017  9:31 AM. (History)        Assessment /Plan     For exam results, see Encounter Report.    Exposure keratoconjunctivitis, unspecified laterality    Conjunctival cyst of left eye    1. Increase ATs and consider jesus or at least gel gtts qhs  Some lagophthalmos is possible  2. Very small conj cyst OS, warm compress, gentle massage to loosen  Too small to domonique effectively at this time  Will recheck prn or at next annual eye exam    Discussed and educated patient on current findings /plan.  RTC 1 year, prn if any changes / issues

## 2017-11-03 NOTE — PATIENT INSTRUCTIONS
"DRY EYES:  Use Over The Counter artificial tears as needed for dry eye symptoms.  Some common brands include:  Systane, Optive, and Refresh.  These drops can be used as frequently as desired, but may be most helpful use during long periods of concentrated work.  For example, reading / working at the computer.  Avoid drops that "get redness out", as these contain medication that may further irritate the eyes.    ALLERGY EYES / SYMPTOMS:    Over the counter medications include--Zaditor and Alaway  Use as directed 1-2 drops daily for symptoms of itching / watering eyes.  These drops will not help for dry eye or exposure symptoms.    Ointments   Lacri-lube   Refresh PM     Genteal gel drops  "

## 2017-11-07 ENCOUNTER — HOSPITAL ENCOUNTER (OUTPATIENT)
Dept: RADIOLOGY | Facility: HOSPITAL | Age: 67
Discharge: HOME | End: 2017-11-07
Attending: NURSE PRACTITIONER
Payer: MEDICARE

## 2017-11-07 DIAGNOSIS — K76.9 LIVER LESION: ICD-10-CM

## 2017-11-07 DIAGNOSIS — R93.429 ABNORMAL CT SCAN, KIDNEY: ICD-10-CM

## 2017-11-07 DIAGNOSIS — R93.2 ABNORMAL GALL BLADDER DIAGNOSTIC IMAGING: ICD-10-CM

## 2017-11-07 DIAGNOSIS — R93.89 ABNORMAL FINDING ON CT SCAN: ICD-10-CM

## 2017-11-07 PROCEDURE — 76700 US EXAM ABDOM COMPLETE: CPT | Mod: TC,PO

## 2017-11-07 PROCEDURE — 76700 US EXAM ABDOM COMPLETE: CPT | Mod: 26,,, | Performed by: RADIOLOGY

## 2017-11-08 ENCOUNTER — TELEPHONE (OUTPATIENT)
Dept: GASTROENTEROLOGY | Facility: CLINIC | Age: 67
End: 2017-11-08

## 2017-11-08 NOTE — TELEPHONE ENCOUNTER
----- Message from Cee Castanon sent at 11/7/2017  3:57 PM CST -----  Contact: thomas   Test results   US   Call back

## 2017-11-08 NOTE — TELEPHONE ENCOUNTER
Pt informed ordering provider not in the office today, once she releases results and recommendations we will call her. Verblaized understanding.

## 2017-11-08 NOTE — TELEPHONE ENCOUNTER
----- Message from Gaby Maldonado sent at 11/8/2017 11:10 AM CST -----  Patient is calling for her test results. Please call back at [phone].

## 2017-11-09 ENCOUNTER — TELEPHONE (OUTPATIENT)
Dept: GASTROENTEROLOGY | Facility: CLINIC | Age: 67
End: 2017-11-09

## 2017-11-09 NOTE — TELEPHONE ENCOUNTER
Please call to inform & review the results with the patient- abdominal ultrasound showed stable findings since last imaging studies.  Continue with previous recommendations.  Please release results to patient's mychart once you have discussed results and recommendations with patient.  Thanks,  Lesvia FITZPATRICK

## 2017-11-24 ENCOUNTER — TELEPHONE (OUTPATIENT)
Dept: OPHTHALMOLOGY | Facility: CLINIC | Age: 67
End: 2017-11-24

## 2017-11-24 ENCOUNTER — TELEPHONE (OUTPATIENT)
Dept: OTOLARYNGOLOGY | Facility: CLINIC | Age: 67
End: 2017-11-24

## 2017-11-24 NOTE — TELEPHONE ENCOUNTER
----- Message from Brittany Stringer sent at 11/24/2017  1:00 PM CST -----  Contact: pt  Hey this was accidently sent to Ophthalmology.  Pt aware that you guys might not be in today.  Thanks  ----- Message -----  From: Maribel Rodríguez  Sent: 11/24/2017  11:16 AM  To: [facility] Clinical Support    Pt would like to be seen as soon as possible in the office for dizziness,please...[phone]

## 2017-11-24 NOTE — TELEPHONE ENCOUNTER
----- Message from Maribel Rodríguez sent at 11/24/2017 11:16 AM CST -----  Contact: pt  Pt would like to be seen as soon as possible in the office for dizziness,please...[phone]

## 2017-11-27 DIAGNOSIS — R42 DIZZY: Primary | ICD-10-CM

## 2017-12-06 ENCOUNTER — TELEPHONE (OUTPATIENT)
Dept: OTOLARYNGOLOGY | Facility: CLINIC | Age: 67
End: 2017-12-06

## 2017-12-06 ENCOUNTER — NURSE TRIAGE (OUTPATIENT)
Dept: ADMINISTRATIVE | Facility: CLINIC | Age: 67
End: 2017-12-06

## 2017-12-06 DIAGNOSIS — N93.0 POSTCOITAL BLEEDING: ICD-10-CM

## 2017-12-06 DIAGNOSIS — N95.2 ATROPHIC VAGINITIS: ICD-10-CM

## 2017-12-06 DIAGNOSIS — Z79.890 POSTMENOPAUSAL HRT (HORMONE REPLACEMENT THERAPY): ICD-10-CM

## 2017-12-06 NOTE — TELEPHONE ENCOUNTER
Spoke with pt and appt scheduled for next week at pt request. Pt verbalized understanding of date and time.

## 2017-12-06 NOTE — TELEPHONE ENCOUNTER
"----- Message from Priscilla Santos sent at 12/6/2017 11:55 AM CST -----  Contact: Patient  Herminia, patient [phone] calling to schedule an appointment for "treatment on her inner ear" and is unsure of which ear. Please advise. Thanks.  "

## 2017-12-06 NOTE — TELEPHONE ENCOUNTER
Reason for Disposition   Taking a medicine that could cause dizziness (e.g., blood pressure medications, diuretics)    Protocols used: ST DIZZINESS-A-OH    Pt reports sinus congestion and dizziness. She reports that her ears are congested. Pt is wanting to know what she can take with her BP meds..    Pt advised to contact PCP who is not with [facility]

## 2017-12-07 RX ORDER — ESTRADIOL 0.1 MG/G
CREAM VAGINAL
Qty: 42.5 G | Refills: 0 | Status: SHIPPED | OUTPATIENT
Start: 2017-12-07 | End: 2018-03-24 | Stop reason: SDUPTHER

## 2017-12-11 ENCOUNTER — OFFICE VISIT (OUTPATIENT)
Dept: OPTOMETRY | Facility: CLINIC | Age: 67
End: 2017-12-11
Payer: MEDICARE

## 2017-12-11 DIAGNOSIS — H25.13 NUCLEAR SCLEROSIS, BILATERAL: Primary | ICD-10-CM

## 2017-12-11 DIAGNOSIS — H52.03 HYPEROPIA WITH ASTIGMATISM AND PRESBYOPIA, BILATERAL: ICD-10-CM

## 2017-12-11 DIAGNOSIS — Z13.5 GLAUCOMA SCREENING: ICD-10-CM

## 2017-12-11 DIAGNOSIS — H52.203 HYPEROPIA WITH ASTIGMATISM AND PRESBYOPIA, BILATERAL: ICD-10-CM

## 2017-12-11 DIAGNOSIS — H43.393 VITREOUS FLOATERS, BILATERAL: ICD-10-CM

## 2017-12-11 DIAGNOSIS — H52.4 HYPEROPIA WITH ASTIGMATISM AND PRESBYOPIA, BILATERAL: ICD-10-CM

## 2017-12-11 PROCEDURE — 99999 PR PBB SHADOW E&M-EST. PATIENT-LVL III: CPT | Mod: PBBFAC,,, | Performed by: OPTOMETRIST

## 2017-12-11 PROCEDURE — 92014 COMPRE OPH EXAM EST PT 1/>: CPT | Mod: S$GLB,,, | Performed by: OPTOMETRIST

## 2017-12-11 NOTE — PROGRESS NOTES
HPI     Annual Exam    Additional comments: DLE 11-17 (nikhil)    ocular health exam            Blurred Vision    Additional comments: at both near & distance            Dry Eye    Additional comments: +Systane OU qid           Spots and/or Floaters    Additional comments: OU -- no light flashes           Comments   Agree above  Notes eyes feel better from visit early November this year  Conj cyst, not noticeable on OS  No other new issues         Last edited by RAJANI Melendrez, OD on 12/11/2017  4:59 PM. (History)        ROS     Positive for: Eyes    Negative for: Constitutional, Gastrointestinal, Neurological, Skin,   Genitourinary, Musculoskeletal, HENT, Endocrine, Cardiovascular,   Respiratory, Psychiatric, Allergic/Imm, Heme/Lymph    Last edited by RAJANI Melendrez, OD on 12/11/2017  2:25 PM. (History)        Assessment /Plan     For exam results, see Encounter Report.    Nuclear sclerosis, bilateral    Vitreous floaters, bilateral    Glaucoma screening    Hyperopia with astigmatism and presbyopia, bilateral      1. Early vis sig, not ready for consult  2. RD precautions given  3. Not suspect  4. Updated specs rx, gave copy, fill prn    Conj cyst not significant at this time, monitor    Discussed and educated patient on current findings /plan.  RTC 1 year, prn if any changes / issues

## 2018-01-10 ENCOUNTER — HOSPITAL ENCOUNTER (OUTPATIENT)
Dept: RADIOLOGY | Facility: HOSPITAL | Age: 68
Discharge: HOME | End: 2018-01-10
Attending: INTERNAL MEDICINE
Payer: MEDICARE

## 2018-01-10 DIAGNOSIS — R05.9 COUGH: ICD-10-CM

## 2018-01-10 PROCEDURE — 71046 X-RAY EXAM CHEST 2 VIEWS: CPT | Mod: 26,,, | Performed by: RADIOLOGY

## 2018-01-10 PROCEDURE — 71046 X-RAY EXAM CHEST 2 VIEWS: CPT | Mod: TC,FY,PO

## 2018-03-24 DIAGNOSIS — N93.0 POSTCOITAL BLEEDING: ICD-10-CM

## 2018-03-24 DIAGNOSIS — N95.2 ATROPHIC VAGINITIS: ICD-10-CM

## 2018-03-24 DIAGNOSIS — Z79.890 POSTMENOPAUSAL HRT (HORMONE REPLACEMENT THERAPY): ICD-10-CM

## 2018-03-24 RX ORDER — ESTRADIOL 0.1 MG/G
CREAM VAGINAL
Qty: 42.5 G | Refills: 0 | Status: SHIPPED | OUTPATIENT
Start: 2018-03-24 | End: 2018-06-26 | Stop reason: SDUPTHER

## 2018-04-20 ENCOUNTER — HOSPITAL ENCOUNTER (OUTPATIENT)
Dept: RADIOLOGY | Facility: HOSPITAL | Age: 68
Discharge: HOME | End: 2018-04-20
Attending: OBSTETRICS & GYNECOLOGY
Payer: MEDICARE

## 2018-04-20 DIAGNOSIS — Z12.39 ENCOUNTER FOR SPECIAL SCREENING EXAMINATION FOR NEOPLASM OF BREAST: ICD-10-CM

## 2018-05-14 ENCOUNTER — OFFICE VISIT (OUTPATIENT)
Dept: OBSTETRICS AND GYNECOLOGY | Facility: CLINIC | Age: 68
End: 2018-05-14
Payer: MEDICARE

## 2018-05-14 VITALS
DIASTOLIC BLOOD PRESSURE: 64 MMHG | SYSTOLIC BLOOD PRESSURE: 132 MMHG | HEIGHT: 59 IN | BODY MASS INDEX: 34.27 KG/M2 | WEIGHT: 170 LBS

## 2018-05-14 DIAGNOSIS — R10.2 VAGINAL PAIN: Primary | ICD-10-CM

## 2018-05-14 LAB
CANDIDA RRNA VAG QL PROBE: NEGATIVE
G VAGINALIS RRNA GENITAL QL PROBE: NEGATIVE
T VAGINALIS RRNA GENITAL QL PROBE: NEGATIVE

## 2018-05-14 PROCEDURE — 3075F SYST BP GE 130 - 139MM HG: CPT | Mod: CPTII,S$GLB,, | Performed by: OBSTETRICS & GYNECOLOGY

## 2018-05-14 PROCEDURE — 99213 OFFICE O/P EST LOW 20 MIN: CPT | Mod: S$GLB,,, | Performed by: OBSTETRICS & GYNECOLOGY

## 2018-05-14 PROCEDURE — 87510 GARDNER VAG DNA DIR PROBE: CPT

## 2018-05-14 PROCEDURE — 87480 CANDIDA DNA DIR PROBE: CPT

## 2018-05-14 PROCEDURE — 99999 PR PBB SHADOW E&M-EST. PATIENT-LVL V: CPT | Mod: PBBFAC,,, | Performed by: OBSTETRICS & GYNECOLOGY

## 2018-05-14 PROCEDURE — 3078F DIAST BP <80 MM HG: CPT | Mod: CPTII,S$GLB,, | Performed by: OBSTETRICS & GYNECOLOGY

## 2018-05-14 RX ORDER — METFORMIN HYDROCHLORIDE 500 MG/1
TABLET ORAL
COMMUNITY
Start: 2018-05-08 | End: 2018-07-17

## 2018-05-14 RX ORDER — CLOBETASOL PROPIONATE 0.5 MG/G
CREAM TOPICAL 2 TIMES DAILY
Qty: 30 G | Refills: 0 | Status: SHIPPED | OUTPATIENT
Start: 2018-05-14 | End: 2018-05-24

## 2018-05-14 RX ORDER — FLUCONAZOLE 150 MG/1
TABLET ORAL
Refills: 1 | COMMUNITY
Start: 2018-05-06 | End: 2018-07-17 | Stop reason: ALTCHOICE

## 2018-05-14 NOTE — PROGRESS NOTES
Chief Complaint   Patient presents with    pain and itch to vaginal area       History of Present Illness: Herminia Wooten is a 67 y.o. female that presents today 5/14/2018 for   Chief Complaint   Patient presents with    pain and itch to vaginal area         Past Medical History:   Diagnosis Date    Fatty liver 4/25/2016    GERD (gastroesophageal reflux disease)     Liver hemangioma        Past Surgical History:   Procedure Laterality Date    ABDOMINAL SURGERY      APPENDECTOMY      CARDIAC CATHETERIZATION      2 stents    COLECTOMY      endometriosis- 8.5 inches removed    COLONOSCOPY      due for screening 2015    COLONOSCOPY N/A 6/27/2016    Procedure: COLONOSCOPY;  Surgeon: Avel De La Cruz Jr., MD;  Location: [facility];  Service: Endoscopy;  Laterality: N/A; repeat in 5 years    HYSTERECTOMY      INCONTINENCE SURGERY      OOPHORECTOMY      rectocele  05/2017    repair    STENT LAD      Stent OM      UPPER GASTROINTESTINAL ENDOSCOPY  6/2013 Dr. Peters    reflux esophagitis; biopsy: negative dysplasia, intestinal metaplasia; mild chronic inflammation- GERD related & regenerative glandular epithelial change; strips of squamous esophageal mucosa with moderate basal epithelial cell hyperplasia and rare intraepithelial eosinophils (< 1 per 10 high power fields)    UPPER GASTROINTESTINAL ENDOSCOPY  06/2016    Dr. de la cruz    URETHRA SURGERY         Current Outpatient Prescriptions   Medication Sig Dispense Refill    ACETYLCYSTEINE (N-ACETYL-L-CYSTEINE MISC) 600 mg by Misc.(Non-Drug; Combo Route) route once daily.      alprazolam (XANAX) 0.25 MG tablet Take 1 tablet (0.25 mg total) by mouth On call Procedure for Anxiety. 1 tablet 1 hour prior to scan, if needed can repeat at start of scan 1 tablet 0    aspirin (ECOTRIN) 81 MG EC tablet Take 81 mg by mouth once daily.      atenolol (TENORMIN) 25 MG tablet Take 12.5 mg by mouth once daily.       atorvastatin (LIPITOR) 20 MG tablet Take 20 mg by  mouth once daily.      cholecalciferol, vitamin D3, (VITAMIN D3) 2,000 unit Tab Take by mouth once daily.      clobetasol (TEMOVATE) 0.05 % cream APPLY 1/2 GRAM TOPICALLY twice weekly 30 g 1    ESTRACE 0.01 % (0.1 mg/gram) vaginal cream PLACE 1 GRAM VAGINALLY EVERY OTHER DAY 42.5 g 0    fluticasone (FLONASE) 50 mcg/actuation nasal spray 1 spray by Each Nare route once daily.      levothyroxine (SYNTHROID) 25 MCG tablet 25 mcg once daily.       lorazepam (ATIVAN) 0.5 MG tablet Take 0.5 mg by mouth every 12 (twelve) hours as needed.       losartan (COZAAR) 50 MG tablet Take 25 mg by mouth once daily.       methen-m.blue-s.phos-phsal-hyo 118-10-40.8-36 mg Cap Take 1 capsule by mouth once daily. 30 capsule 5    mometasone 220 mcg (30 doses) inhaler Inhale 2 puffs into the lungs once daily.      ranitidine (ZANTAC) 150 MG tablet Take 2 tablets (300 mg total) by mouth nightly.      triamcinolone acetonide 0.1% (KENALOG) 0.1 % paste   1    betamethasone valerate 0.1% (VALISONE) 0.1 % Crea Apply topically 2 (two) times daily. 45 g 1    clobetasol (TEMOVATE) 0.05 % cream Apply topically 2 (two) times daily. 30 g 0    fluconazole (DIFLUCAN) 150 MG Tab TK 1 T PO NOW. MAY REPEAT IN 3 DAYS  1    ibuprofen (ADVIL,MOTRIN) 600 MG tablet Take 1 tablet (600 mg total) by mouth every 6 (six) hours as needed for Pain. 20 tablet 0    ipratropium (ATROVENT) 0.06 % nasal spray U 1 OR 2 SPRAYS IEN BID PRF POST NASAL DRIP  0    Lactobacillus rhamnosus GG (CULTURELLE) 10 billion cell capsule Take 1 capsule by mouth once daily.      meclizine (ANTIVERT) 25 mg tablet 25 mg 3 (three) times daily as needed.       metFORMIN (GLUCOPHAGE) 500 MG tablet       ondansetron (ZOFRAN-ODT) 4 MG TbDL Take 1-2 tablets (4-8 mg total) by mouth every 8 (eight) hours as needed. 15 tablet 0    phenazopyridine (PYRIDIUM) 100 MG tablet TAKE ONE TABLET BY MOUTH THREE TIMES DAILY AS NEEDED FOR PAIN 6 tablet 0    sodium chloride (SALINE NASAL)  "0.65 % nasal spray 1 spray by Nasal route as needed.       No current facility-administered medications for this visit.        Review of patient's allergies indicates:   Allergen Reactions    Codeine Other (See Comments)     nervousness    Caffeine Anxiety     In medications    Percodan [oxycodone hcl-oxycodone-asa] Anxiety       Family History   Problem Relation Age of Onset    Clotting disorder Maternal Grandmother     Diverticulitis Maternal Grandfather     Anesthesia problems Neg Hx     Breast cancer Neg Hx     Ovarian cancer Neg Hx     Colon cancer Neg Hx     Colon polyps Neg Hx     Crohn's disease Neg Hx     Ulcerative colitis Neg Hx        Social History   Substance Use Topics    Smoking status: Former Smoker     Packs/day: 0.25     Years: 35.00     Quit date: 10/7/1999    Smokeless tobacco: Never Used    Alcohol use No       OB History    Para Term  AB Living   1 1 1         SAB TAB Ectopic Multiple Live Births                  # Outcome Date GA Lbr Elfego/2nd Weight Sex Delivery Anes PTL Lv   1 Term                   Review of Symptoms:  GENERAL: Denies weight gain or weight loss. Feeling well overall.   SKIN: Denies rash or lesions.   HEAD: Denies head injury or headache.   NODES: Denies enlarged lymph nodes.   CHEST: Denies chest pain or shortness of breath.   CARDIOVASCULAR: Denies palpitations or left sided chest pain.   ABDOMEN: No abdominal pain, constipation, diarrhea, nausea, vomiting or rectal bleeding.   URINARY: No frequency, dysuria, hematuria, or burning on urination.  HEMATOLOGIC: No easy bruisability or excessive bleeding.   MUSCULOSKELETAL: Denies joint pain or swelling.     /64   Ht 4' 11" (1.499 m)   Wt 77.1 kg (169 lb 15.6 oz)   LMP 10/07/1976   Physical Exam:  APPEARANCE: Well nourished, well developed, in no acute distress.  SKIN: Normal skin turgor, no lesions.  NECK: Neck symmetric without masses   RESPIRATORY: Normal respiratory effort with no " retractions or use of accessory muscles  CARDIOVASCULAR: Peripheral vascular system with no swelling no varicosities and palpation of pulses normal  LYMPHATIC: No enlargements of the lymph nodes noted in the neck, axillae, or groin  ABDOMEN: Soft. No tenderness or masses. No hepatosplenomegaly. No hernias.  PELVIC: Normal external female genitalia with ++ clitorus LS ++. Normal hair distribution. Adequate perineal body, normal urethral meatus. Urethra with no masses.  Bladder nontender. Vagina moist and well rugated without lesions or discharge.  Adnexa without masses or tenderness. Urethra and bladder normal.  EXTREMITIES: No clubbing cyanosis or edema.    ASSESSMENT/PLAN:  Vaginal pain  -     Vaginosis Screen by DNA Probe    Other orders  -     clobetasol (TEMOVATE) 0.05 % cream; Apply topically 2 (two) times daily.  Dispense: 30 g; Refill: 0      15 minutes spent today with this patient. Greater than half spent in counseling today.

## 2018-06-26 DIAGNOSIS — N95.2 ATROPHIC VAGINITIS: ICD-10-CM

## 2018-06-26 DIAGNOSIS — N93.0 POSTCOITAL BLEEDING: ICD-10-CM

## 2018-06-26 DIAGNOSIS — Z79.890 POSTMENOPAUSAL HRT (HORMONE REPLACEMENT THERAPY): ICD-10-CM

## 2018-06-26 RX ORDER — ESTRADIOL 0.1 MG/G
CREAM VAGINAL
Qty: 42.5 G | Refills: 0 | Status: SHIPPED | OUTPATIENT
Start: 2018-06-26 | End: 2018-08-23 | Stop reason: SDUPTHER

## 2018-07-17 ENCOUNTER — OFFICE VISIT (OUTPATIENT)
Dept: FAMILY MEDICINE | Facility: CLINIC | Age: 68
End: 2018-07-17
Payer: MEDICARE

## 2018-07-17 VITALS
DIASTOLIC BLOOD PRESSURE: 64 MMHG | TEMPERATURE: 98 F | BODY MASS INDEX: 34.62 KG/M2 | HEIGHT: 59 IN | OXYGEN SATURATION: 96 % | WEIGHT: 171.75 LBS | SYSTOLIC BLOOD PRESSURE: 138 MMHG | HEART RATE: 65 BPM

## 2018-07-17 DIAGNOSIS — J34.89 SINUS PRESSURE: Primary | ICD-10-CM

## 2018-07-17 PROCEDURE — 3078F DIAST BP <80 MM HG: CPT | Mod: CPTII,S$GLB,, | Performed by: NURSE PRACTITIONER

## 2018-07-17 PROCEDURE — 3075F SYST BP GE 130 - 139MM HG: CPT | Mod: CPTII,S$GLB,, | Performed by: NURSE PRACTITIONER

## 2018-07-17 PROCEDURE — 99999 PR PBB SHADOW E&M-EST. PATIENT-LVL V: CPT | Mod: PBBFAC,,, | Performed by: NURSE PRACTITIONER

## 2018-07-17 PROCEDURE — 99213 OFFICE O/P EST LOW 20 MIN: CPT | Mod: S$GLB,,, | Performed by: NURSE PRACTITIONER

## 2018-07-17 RX ORDER — PREDNISONE 20 MG/1
TABLET ORAL
Qty: 10 TABLET | Refills: 0 | Status: SHIPPED | OUTPATIENT
Start: 2018-07-17 | End: 2018-07-22

## 2018-07-17 NOTE — PROGRESS NOTES
Subjective:       Patient ID: Herminia Wooten is a 67 y.o. female.    Chief Complaint: Sinus Problem; Dizziness; and Ear Fullness    Patient has had vertigo in the past, last treated about 6 months. She has noticed ear pressure, head fullness, and intermittent dizziness x 2 -3 weeks. Notices sinus pressure and swelling in am of the rigth maxillary area.  Staying about the same since it flared up. No improvement with flonase or claritin. Some mild neck soreness.  Hepatitis C Screening due on 1949  TETANUS VACCINE due on 09/18/1967  DEXA SCAN due on 09/18/1989  Zoster Vaccine due on 09/18/2009  Pneumococcal (65+)(1 of 2 - PCV13) due on 09/18/2014    Past Medical History:  Past Medical History:  4/25/2016: Fatty liver  No date: GERD (gastroesophageal reflux disease)  No date: Liver hemangioma  Past Surgical History:  No date: ABDOMINAL SURGERY  No date: APPENDECTOMY  No date: CARDIAC CATHETERIZATION      Comment: 2 stents  No date: COLECTOMY      Comment: endometriosis- 8.5 inches removed  No date: COLONOSCOPY      Comment: due for screening 2015 6/27/2016: COLONOSCOPY N/A      Comment: Procedure: COLONOSCOPY;  Surgeon: Avel Luna Jr., MD;  Location: [facility];  Service:                Endoscopy;  Laterality: N/A; repeat in 5 years  No date: HYSTERECTOMY  No date: INCONTINENCE SURGERY  No date: OOPHORECTOMY  05/2017: rectocele      Comment: repair  No date: STENT LAD  No date: Stent OM  6/2013 Dr. Peters: UPPER GASTROINTESTINAL ENDOSCOPY      Comment: reflux esophagitis; biopsy: negative                dysplasia, intestinal metaplasia; mild chronic                inflammation- GERD related & regenerative                glandular epithelial change; strips of squamous               esophageal mucosa with moderate basal                epithelial cell hyperplasia and rare                intraepithelial eosinophils (< 1 per 10 high                power fields)  06/2016: UPPER GASTROINTESTINAL  ENDOSCOPY      Comment: Dr. de la cruz  No date: URETHRA SURGERY  Review of patient's allergies indicates:   -- Codeine -- Other (See Comments)    --  nervousness   -- Caffeine -- Anxiety    --  In medications   -- Percodan (oxycodone hcl-oxycodone-asa) -- Anxiety  Current Outpatient Prescriptions on File Prior to Visit:  alprazolam (XANAX) 0.25 MG tablet, Take 1 tablet (0.25 mg total) by mouth On call Procedure for Anxiety. 1 tablet 1 hour prior to scan, if needed can repeat at start of scan, Disp: 1 tablet, Rfl: 0  aspirin (ECOTRIN) 81 MG EC tablet, Take 81 mg by mouth once daily., Disp: , Rfl:   atenolol (TENORMIN) 25 MG tablet, Take 12.5 mg by mouth once daily. , Disp: , Rfl:   atorvastatin (LIPITOR) 20 MG tablet, Take 20 mg by mouth once daily., Disp: , Rfl:   betamethasone valerate 0.1% (VALISONE) 0.1 % Crea, Apply topically 2 (two) times daily., Disp: 45 g, Rfl: 1  cholecalciferol, vitamin D3, (VITAMIN D3) 2,000 unit Tab, Take by mouth once daily., Disp: , Rfl:   clobetasol (TEMOVATE) 0.05 % cream, APPLY 1/2 GRAM TOPICALLY twice weekly, Disp: 30 g, Rfl: 1  ESTRACE 0.01 % (0.1 mg/gram) vaginal cream, PLACE 1 GRAM VAGINALLY EVERY OTHER DAY, Disp: 42.5 g, Rfl: 0  fluticasone (FLONASE) 50 mcg/actuation nasal spray, 1 spray by Each Nare route once daily., Disp: , Rfl:   ibuprofen (ADVIL,MOTRIN) 600 MG tablet, Take 1 tablet (600 mg total) by mouth every 6 (six) hours as needed for Pain., Disp: 20 tablet, Rfl: 0  levothyroxine (SYNTHROID) 25 MCG tablet, 25 mcg once daily. , Disp: , Rfl:   lorazepam (ATIVAN) 0.5 MG tablet, Take 0.5 mg by mouth every 12 (twelve) hours as needed. , Disp: , Rfl:   losartan (COZAAR) 50 MG tablet, Take 25 mg by mouth once daily. , Disp: , Rfl:   mometasone 220 mcg (30 doses) inhaler, Inhale 2 puffs into the lungs once daily., Disp: , Rfl:   ranitidine (ZANTAC) 150 MG tablet, Take 2 tablets (300 mg total) by mouth nightly., Disp: , Rfl:   (DISCONTINUED) ACETYLCYSTEINE (N-ACETYL-L-CYSTEINE  MISC), 600 mg by Misc.(Non-Drug; Combo Route) route once daily., Disp: , Rfl:   (DISCONTINUED) fluconazole (DIFLUCAN) 150 MG Tab, TK 1 T PO NOW. MAY REPEAT IN 3 DAYS, Disp: , Rfl: 1  (DISCONTINUED) ipratropium (ATROVENT) 0.06 % nasal spray, U 1 OR 2 SPRAYS IEN BID PRF POST NASAL DRIP, Disp: , Rfl: 0  (DISCONTINUED) Lactobacillus rhamnosus GG (CULTURELLE) 10 billion cell capsule, Take 1 capsule by mouth once daily., Disp: , Rfl:   (DISCONTINUED) meclizine (ANTIVERT) 25 mg tablet, 25 mg 3 (three) times daily as needed. , Disp: , Rfl:   (DISCONTINUED) metFORMIN (GLUCOPHAGE) 500 MG tablet, , Disp: , Rfl:   (DISCONTINUED) methen-m.blue-s.phos-phsal-hyo 118-10-40.8-36 mg Cap, Take 1 capsule by mouth once daily., Disp: 30 capsule, Rfl: 5  (DISCONTINUED) ondansetron (ZOFRAN-ODT) 4 MG TbDL, Take 1-2 tablets (4-8 mg total) by mouth every 8 (eight) hours as needed., Disp: 15 tablet, Rfl: 0  (DISCONTINUED) phenazopyridine (PYRIDIUM) 100 MG tablet, TAKE ONE TABLET BY MOUTH THREE TIMES DAILY AS NEEDED FOR PAIN, Disp: 6 tablet, Rfl: 0  (DISCONTINUED) sodium chloride (SALINE NASAL) 0.65 % nasal spray, 1 spray by Nasal route as needed., Disp: , Rfl:   (DISCONTINUED) triamcinolone acetonide 0.1% (KENALOG) 0.1 % paste, , Disp: , Rfl: 1    No current facility-administered medications on file prior to visit.     Social History    Marital status:              Spouse name:                       Years of education:                 Number of children:               Occupational History    None on file    Social History Main Topics    Smoking status: Former Smoker                                                                Packs/day: 0.25      Years: 35.00          Quit date: 10/7/1999    Smokeless tobacco: Never Used                        Alcohol use: No              Drug use: No              Sexual activity: Yes               Partners with: Male       Birth control/protection: See Surgical Hx    Other Topics            Concern     None on file    Social History Narrative    None on file      Review of patient's family history indicates:  Problem: Clotting disorder      Relation: Maternal Grandmother       Age of Onset: (Not Specified)   Problem: Diverticulitis      Relation: Maternal Grandfather       Age of Onset: (Not Specified)   Problem: Anesthesia problems      Relation: Neg Hx       Age of Onset: (Not Specified)   Problem: Breast cancer      Relation: Neg Hx       Age of Onset: (Not Specified)   Problem: Ovarian cancer      Relation: Neg Hx       Age of Onset: (Not Specified)   Problem: Colon cancer      Relation: Neg Hx       Age of Onset: (Not Specified)   Problem: Colon polyps      Relation: Neg Hx       Age of Onset: (Not Specified)   Problem: Crohn's disease      Relation: Neg Hx       Age of Onset: (Not Specified)   Problem: Ulcerative colitis      Relation: Neg Hx       Age of Onset: (Not Specified)             Sinus Problem   This is a recurrent problem. The current episode started 1 to 4 weeks ago. The problem has been waxing and waning since onset. There has been no fever. The pain is mild. Associated symptoms include sinus pressure. Pertinent negatives include no chills, congestion, coughing, diaphoresis, ear pain, headaches, hoarse voice, neck pain, shortness of breath, sneezing, sore throat or swollen glands. Treatments tried: flonase, claritin. The treatment provided mild relief.     Review of Systems   Constitutional: Negative for chills and diaphoresis.   HENT: Positive for sinus pressure. Negative for congestion, ear pain, hearing loss, hoarse voice, sneezing and sore throat.    Respiratory: Negative.  Negative for cough, choking, chest tightness and shortness of breath.    Cardiovascular: Negative.  Negative for chest pain and leg swelling.   Gastrointestinal: Negative.  Negative for abdominal pain, constipation, diarrhea, nausea, rectal pain and vomiting.   Genitourinary: Negative.    Musculoskeletal: Negative for  neck pain.   Neurological: Positive for dizziness. Negative for headaches.       Objective:      Physical Exam   Constitutional: She is oriented to person, place, and time. No distress.   HENT:   Head: Normocephalic and atraumatic.       Right Ear: A middle ear effusion is present.   Left Ear:  No middle ear effusion.   Nose: Mucosal edema present. No rhinorrhea. Right sinus exhibits maxillary sinus tenderness and frontal sinus tenderness. Left sinus exhibits no maxillary sinus tenderness and no frontal sinus tenderness.   Mouth/Throat: Uvula is midline and oropharynx is clear and moist.   Eyes: Pupils are equal, round, and reactive to light.   Neck: Normal range of motion.   Cardiovascular: Normal rate and regular rhythm.  Exam reveals no friction rub.    No murmur heard.  Pulmonary/Chest: Effort normal and breath sounds normal. No respiratory distress. She has no wheezes.   Musculoskeletal: She exhibits no edema.   Neurological: She is alert and oriented to person, place, and time. No cranial nerve deficit.   Skin: She is not diaphoretic.   Psychiatric: She has a normal mood and affect. Her behavior is normal.       Assessment:       1. Sinus pressure        Plan:       1. Sinus pressure  Swelling and tenderness of right maxillary, will ct sinuses, start prednisone, start sinus lavage, continue Flonase and Claritin, refer to ent if not improving.   - CT Sinuses without Contrast; Future  - predniSONE (DELTASONE) 20 MG tablet; 1 pill twice daily x 2 days, 1 pill daily x 3 days then stop.  Dispense: 10 tablet; Refill: 0

## 2018-07-18 ENCOUNTER — HOSPITAL ENCOUNTER (OUTPATIENT)
Dept: RADIOLOGY | Facility: HOSPITAL | Age: 68
Discharge: HOME | End: 2018-07-18
Attending: NURSE PRACTITIONER
Payer: MEDICARE

## 2018-07-18 DIAGNOSIS — J34.89 SINUS PRESSURE: ICD-10-CM

## 2018-07-18 PROCEDURE — 70486 CT MAXILLOFACIAL W/O DYE: CPT | Mod: 26,,, | Performed by: RADIOLOGY

## 2018-07-18 PROCEDURE — 70486 CT MAXILLOFACIAL W/O DYE: CPT | Mod: TC,PO

## 2018-08-07 ENCOUNTER — OFFICE VISIT (OUTPATIENT)
Dept: OTOLARYNGOLOGY | Facility: CLINIC | Age: 68
End: 2018-08-07
Payer: MEDICARE

## 2018-08-07 VITALS — HEIGHT: 59 IN | WEIGHT: 169.56 LBS | BODY MASS INDEX: 34.18 KG/M2

## 2018-08-07 DIAGNOSIS — J34.3 HYPERTROPHY OF BOTH INFERIOR NASAL TURBINATES: ICD-10-CM

## 2018-08-07 DIAGNOSIS — J30.1 SEASONAL ALLERGIC RHINITIS DUE TO POLLEN: Primary | ICD-10-CM

## 2018-08-07 PROCEDURE — 99999 PR PBB SHADOW E&M-EST. PATIENT-LVL III: CPT | Mod: PBBFAC,,, | Performed by: OTOLARYNGOLOGY

## 2018-08-07 PROCEDURE — 99214 OFFICE O/P EST MOD 30 MIN: CPT | Mod: S$GLB,,, | Performed by: OTOLARYNGOLOGY

## 2018-08-07 RX ORDER — BETAMETHASONE VALERATE 1.2 MG/G
1 CREAM TOPICAL
COMMUNITY
End: 2018-09-12

## 2018-08-07 NOTE — PROGRESS NOTES
Subjective:       Patient ID: Herminia Wooten is a 67 y.o. female.    Chief Complaint: Fluid in ears and Dizziness    Herminia is here for dizziness and allergic issues. Symptoms have been present for a few weeks. She has h/o vertigo / dizziness in the past. She had an ER visit last month for this issue. She was having lightheadedness and there was concern for possible effusion(s). During this time, her allergies were flaring up.   She takes Flonase at night and Claritin daily. She used to have IT. Her main complaint is intermittent fullness, pressure, and congestion. This has generally improved since her ER visit but she wanted to check it out.   She has history of BPPV in 2013, 2016, 2017  She takes oral AH, INS, and Mucinex.  She has lichen planus, oral manifestation       History   Smoking Status    Former Smoker    Packs/day: 0.25    Years: 35.00    Quit date: 10/7/1999   Smokeless Tobacco    Never Used     History   Alcohol Use No          Review of Systems   Constitutional: Negative for activity change and appetite change.   Eyes: Negative for discharge.   Respiratory: Negative for difficulty breathing and wheezing   Cardiovascular: Negative for chest pain.   Gastrointestinal: Negative for abdominal distention and abdominal pain.   Endocrine: Negative for cold intolerance and heat intolerance.   Genitourinary: Negative for dysuria.   Musculoskeletal: Negative for gait problem and joint swelling.   Skin: Negative for color change and pallor.   Neurological: Negative for syncope and weakness.   Psychiatric/Behavioral: Negative for agitation and confusion.     Objective:        Constitutional:   She is oriented to person, place, and time. She appears well-developed and well-nourished. She appears alert. She is active. Normal speech.      Head:  Normocephalic and atraumatic. Head is without TMJ tenderness. No scars. Salivary glands normal.  Facial strength is normal.      Ears:    Right Ear: No drainage or  swelling. No middle ear effusion.   Left Ear: No drainage or swelling.  No middle ear effusion.     Nose:  No mucosal edema, rhinorrhea or sinus tenderness. Turbinate hypertrophy.      Mouth/Throat  Oropharynx clear and moist without lesions or asymmetry, normal uvula midline and mirror exam normal. Normal dentition. No uvula swelling, lacerations or trismus. No oropharyngeal exudate. Tonsillar erythema, tonsillar exudate.      Neck:  Full range of motion with neck supple and no adenopathy. Thyroid tenderness is present. No tracheal deviation, no edema, no erythema, normal range of motion, no stridor, no crepitus and no neck rigidity present. No thyroid mass present.     Cardiovascular:   Intact distal pulses and normal pulses.      Pulmonary/Chest:   Effort normal and breath sounds normal. No stridor.     Psychiatric:   Her speech is normal and behavior is normal. Her mood appears not anxious. Her affect is not labile.     Neurological:   She is alert and oriented to person, place, and time. No sensory deficit.     Skin:   No abrasions, lacerations, lesions, or rashes. No abrasion and no bruising noted.         Tests / Results:  I personally reviewed the CT Sinus and my findings reveal: normal paranasal sinuses with out flow tracts. ITH     Assessment:       1. Seasonal allergic rhinitis due to pollen    2. Hypertrophy of both inferior nasal turbinates          Plan:       Continue Flonase  Back off on oral AH regularly to see if needed regularly.  Regular nasal saline rinses  Ears look improved, no effusions today  May need to consider IT / Allergy again if persistent issues.

## 2018-08-07 NOTE — PATIENT INSTRUCTIONS
Ears look good    Nasal Irrigations  This will help remove the allergens, debris, and mucous from your nose to help you breathe. It will also clear it in preparation for other nasal medications.    To perform, purchase an over the counter sinus irrigation kit such as the [employer] Sinus Rinse Kit. Use as directed on the box. You should use distilled water or water that was previously boiled and left to cool. If you wish, you may make your own solution. However, salt packets are available in the nasal section in your  drug store.     A rough estimate for making salt solution is:  8oz water  2 teaspoons salt (pickling, maria isabel or Kosher salt)  1 teaspoon baking soda    After each use, rinse the bottle with small amount of rubbing alcohol and clean with soap.  Replace the irrigation bottle if it becomes visibly soiled or every few weeks.    Nasal Steroid Spray    You have been prescribed or instructed to take a nasal steroid spray. Examples of this medication include Flonase (fluticasone), Nasacort (triamcinolone), and Rhinocort (budeosnide). Some symptoms will experience relief within 1-2 days; however, it may take other side effects 2-3 weeks to begin to see improvement. This medication needs to be taken consistently to see results.    Use as directed, spraying 1-2 times in each nostril per day.     Helpful hints for maximizing medication into the nose  - Use the opposite hand to spray the nostril (example: right hand for left nostril). This will help avoid spraying the medication onto the septum (the area that divides the left and right nasal cavity.)  - Tilt the bottle so that it is facing at a slight angle up or straight back, but avoid pointing the bottle straight up while spraying.   - Sniff in while you are spraying.    Side effects:  Overall, this is a well tolerated medication with low side effects. The benefit of nasal steroids as opposed to oral steroids is that the nasal steroid works primarily in the  nose.  Common side effects: headache, nasal dryness, minor nose bleeds,   Rare side effects: septal perforation, elevated eye pressures, dry eyes, change in smell, allergic reaction.  Notify your doctor if you have any concerns or experience these symptoms.

## 2018-08-16 ENCOUNTER — PATIENT MESSAGE (OUTPATIENT)
Dept: OTOLARYNGOLOGY | Facility: CLINIC | Age: 68
End: 2018-08-16

## 2018-08-16 ENCOUNTER — CLINICAL SUPPORT (OUTPATIENT)
Dept: AUDIOLOGY | Facility: CLINIC | Age: 68
End: 2018-08-16
Payer: MEDICARE

## 2018-08-16 DIAGNOSIS — R42 DIZZINESS: Primary | ICD-10-CM

## 2018-08-16 PROCEDURE — 92542 POSITIONAL NYSTAGMUS TEST: CPT | Mod: S$GLB,,, | Performed by: AUDIOLOGIST

## 2018-08-16 NOTE — PROGRESS NOTES
"Herminia Wooten was seen 9/16/18 for a BPPV evaluation    Patient reported the feeling of fogginess and and disorientation in her head.  She denied any true vertigo. She feels it most when outdoors and has an intermittent pressure in her ear.  She reported a clear thick drainage from her nose.  She said this version of her "dizziness" seems different than when she has had BPPV before but that she was unsure if it was still the "crystal" problem again.      VAST was negative right and negative left   Saucedo Right Positional was negative  Head Left Positional was negative  Hallpike Right was negative  Hallpike Left was negative    Impression: Negative for BPPV bilaterally.     Symptoms do not seem consistent with a vestibular disorder. Will send chart note to Dr. Burton Jorgensen to discuss sinus/ear symptoms with patient further as needed.   "

## 2018-08-23 ENCOUNTER — OFFICE VISIT (OUTPATIENT)
Dept: OBSTETRICS AND GYNECOLOGY | Facility: CLINIC | Age: 68
End: 2018-08-23
Payer: MEDICARE

## 2018-08-23 ENCOUNTER — HOSPITAL ENCOUNTER (OUTPATIENT)
Dept: RADIOLOGY | Facility: HOSPITAL | Age: 68
Discharge: HOME | End: 2018-08-23
Attending: OBSTETRICS & GYNECOLOGY
Payer: MEDICARE

## 2018-08-23 VITALS
DIASTOLIC BLOOD PRESSURE: 72 MMHG | WEIGHT: 169.75 LBS | BODY MASS INDEX: 34.29 KG/M2 | SYSTOLIC BLOOD PRESSURE: 126 MMHG

## 2018-08-23 VITALS — WEIGHT: 169 LBS | HEIGHT: 59 IN | BODY MASS INDEX: 34.07 KG/M2

## 2018-08-23 DIAGNOSIS — Z12.31 VISIT FOR SCREENING MAMMOGRAM: ICD-10-CM

## 2018-08-23 DIAGNOSIS — L23.9 ALLERGIC CONTACT DERMATITIS, UNSPECIFIED TRIGGER: ICD-10-CM

## 2018-08-23 DIAGNOSIS — N93.0 POSTCOITAL BLEEDING: ICD-10-CM

## 2018-08-23 DIAGNOSIS — N95.2 ATROPHIC VAGINITIS: ICD-10-CM

## 2018-08-23 DIAGNOSIS — L90.0 LICHEN SCLEROSUS ET ATROPHICUS: ICD-10-CM

## 2018-08-23 DIAGNOSIS — Z01.419 ENCOUNTER FOR GYNECOLOGICAL EXAMINATION WITHOUT ABNORMAL FINDING: Primary | ICD-10-CM

## 2018-08-23 DIAGNOSIS — Z79.890 POSTMENOPAUSAL HRT (HORMONE REPLACEMENT THERAPY): ICD-10-CM

## 2018-08-23 DIAGNOSIS — Z78.0 MENOPAUSE: ICD-10-CM

## 2018-08-23 PROCEDURE — 77063 BREAST TOMOSYNTHESIS BI: CPT | Mod: 26,,, | Performed by: RADIOLOGY

## 2018-08-23 PROCEDURE — 3078F DIAST BP <80 MM HG: CPT | Mod: CPTII,S$GLB,, | Performed by: OBSTETRICS & GYNECOLOGY

## 2018-08-23 PROCEDURE — 77067 SCR MAMMO BI INCL CAD: CPT | Mod: TC,PN

## 2018-08-23 PROCEDURE — 77067 SCR MAMMO BI INCL CAD: CPT | Mod: 26,,, | Performed by: RADIOLOGY

## 2018-08-23 PROCEDURE — 99999 PR PBB SHADOW E&M-EST. PATIENT-LVL III: CPT | Mod: PBBFAC,,, | Performed by: OBSTETRICS & GYNECOLOGY

## 2018-08-23 PROCEDURE — 3074F SYST BP LT 130 MM HG: CPT | Mod: CPTII,S$GLB,, | Performed by: OBSTETRICS & GYNECOLOGY

## 2018-08-23 PROCEDURE — G0101 CA SCREEN;PELVIC/BREAST EXAM: HCPCS | Mod: S$GLB,,, | Performed by: OBSTETRICS & GYNECOLOGY

## 2018-08-23 RX ORDER — ESTRADIOL 0.1 MG/G
CREAM VAGINAL
Qty: 42.5 G | Refills: 1 | Status: SHIPPED | OUTPATIENT
Start: 2018-08-23 | End: 2019-08-23 | Stop reason: SDUPTHER

## 2018-08-23 RX ORDER — CLOBETASOL PROPIONATE 0.5 MG/G
CREAM TOPICAL
Qty: 30 G | Refills: 1 | Status: SHIPPED | OUTPATIENT
Start: 2018-08-23 | End: 2019-08-23 | Stop reason: SDUPTHER

## 2018-08-23 NOTE — PROGRESS NOTES
Chief Complaint   Patient presents with    Well Woman     dexa order    Constipation     pt states she is still having to use finger to help with BM    Medication Refill     estrace     History of Present Illness: Herminia Wooten is a 67 y.o. female that presents today 8/23/2018 for well gyn visit.    Past Medical History:   Diagnosis Date    Coronary artery disease     Fatty liver 4/25/2016    GERD (gastroesophageal reflux disease)     Hyperlipidemia     Hypertension     Hypothyroid     Liver hemangioma     Thyroid disease        Past Surgical History:   Procedure Laterality Date    ABDOMINAL SURGERY      APPENDECTOMY      CARDIAC CATHETERIZATION      2 stents    COLECTOMY      endometriosis- 8.5 inches removed    COLONOSCOPY  2016    repeat in 5    HYSTERECTOMY      INCONTINENCE SURGERY      OOPHORECTOMY      rectocele  05/2017    repair    STENT LAD      Stent OM      UPPER GASTROINTESTINAL ENDOSCOPY  6/2013 Dr. Peters    reflux esophagitis; biopsy: negative dysplasia, intestinal metaplasia; mild chronic inflammation- GERD related & regenerative glandular epithelial change; strips of squamous esophageal mucosa with moderate basal epithelial cell hyperplasia and rare intraepithelial eosinophils (< 1 per 10 high power fields)    UPPER GASTROINTESTINAL ENDOSCOPY  06/2016    Dr. de la cruz    URETHRA SURGERY         Current Outpatient Medications   Medication Sig Dispense Refill    aspirin (ECOTRIN) 81 MG EC tablet Take 81 mg by mouth once daily.      atenolol (TENORMIN) 25 MG tablet Take 6.25 mg by mouth every evening.       atorvastatin (LIPITOR) 20 MG tablet Take 20 mg by mouth once daily.      betamethasone valerate 0.1% (VALISONE) 0.1 % Crea Apply 1 application topically as needed.      cholecalciferol, vitamin D3, (VITAMIN D3) 2,000 unit Tab Take by mouth 2 (two) times daily.       clobetasol (TEMOVATE) 0.05 % cream APPLY 1/2 GRAM TOPICALLY twice weekly 30 g 1    estradiol (ESTRACE)  0.01 % (0.1 mg/gram) vaginal cream PLACE 1 GRAM VAGINALLY twice weekly 42.5 g 1    fluticasone (FLONASE) 50 mcg/actuation nasal spray 1 spray by Each Nare route once daily.      levothyroxine (SYNTHROID) 25 MCG tablet 25 mcg once daily.       lorazepam (ATIVAN) 0.5 MG tablet Take 0.5 mg by mouth every 12 (twelve) hours as needed.       losartan (COZAAR) 50 MG tablet Take 25 mg by mouth once daily.       mometasone 220 mcg (30 doses) inhaler Inhale 2 puffs into the lungs once daily.      ranitidine (ZANTAC) 150 MG tablet Take 2 tablets (300 mg total) by mouth nightly. (Patient taking differently: Take 150 mg by mouth nightly as needed. )       No current facility-administered medications for this visit.        Review of patient's allergies indicates:   Allergen Reactions    Codeine Other (See Comments)     nervousness    Caffeine Anxiety     In medications    Percodan [oxycodone hcl-oxycodone-asa] Anxiety       Family History   Problem Relation Age of Onset    Clotting disorder Maternal Grandmother     Diverticulitis Maternal Grandfather     Anesthesia problems Neg Hx     Breast cancer Neg Hx     Ovarian cancer Neg Hx     Colon cancer Neg Hx     Colon polyps Neg Hx     Crohn's disease Neg Hx     Ulcerative colitis Neg Hx        Social History     Socioeconomic History    Marital status:      Spouse name: None    Number of children: None    Years of education: None    Highest education level: None   Social Needs    Financial resource strain: None    Food insecurity - worry: None    Food insecurity - inability: None    Transportation needs - medical: None    Transportation needs - non-medical: None   Occupational History    None   Tobacco Use    Smoking status: Former Smoker     Packs/day: 0.25     Years: 35.00     Pack years: 8.75     Last attempt to quit: 10/7/1999     Years since quittin.8    Smokeless tobacco: Never Used   Substance and Sexual Activity    Alcohol use: No     Drug use: No    Sexual activity: Yes     Partners: Male     Birth control/protection: See Surgical Hx   Other Topics Concern    None   Social History Narrative    None       OB History    Para Term  AB Living   1 1 1         SAB TAB Ectopic Multiple Live Births                  # Outcome Date GA Lbr Elfego/2nd Weight Sex Delivery Anes PTL Lv   1 Term                   Review of Symptoms:  GENERAL: Denies weight gain or weight loss. Feeling well overall.   SKIN: Denies rash or lesions.   HEAD: Denies head injury or headache.   NODES: Denies enlarged lymph nodes.   CHEST: Denies chest pain or shortness of breath.   CARDIOVASCULAR: Denies palpitations or left sided chest pain.   ABDOMEN: No abdominal pain, constipation, diarrhea, nausea, vomiting or rectal bleeding.   URINARY: No frequency, dysuria, hematuria, or burning on urination.  HEMATOLOGIC: No easy bruisability or excessive bleeding.   MUSCULOSKELETAL: Denies joint pain or swelling.     /72   Wt 77 kg (169 lb 12.1 oz)   LMP 10/07/1976   Physical Exam:  APPEARANCE: Well nourished, well developed, in no acute distress.  SKIN: Normal skin turgor, no lesions.  NECK: Neck symmetric without masses   RESPIRATORY: Normal respiratory effort with no retractions or use of accessory muscles  CARDIOVASCULAR: Peripheral vascular system with no swelling no varicosities and palpation of pulses normal  LYMPHATIC: No enlargements of the lymph nodes noted in the neck, axillae, or groin  ABDOMEN: Soft. No tenderness or masses. No hepatosplenomegaly. No hernias.  BREASTS: Symmetrical, no skin changes or visible lesions. No palpable masses, nipple discharge or adenopathy bilaterally.  PELVIC: Normal external female genitalia without lesions. Normal hair distribution. Adequate perineal body, normal urethral meatus. Urethra with no masses.  Bladder nontender. Vagina moist and well rugated without lesions or discharge. No significant cystocele or rectocele.   Adnexa without masses or tenderness. Urethra and bladder normal.   EXTREMITIES: No clubbing cyanosis or edema.    ASSESSMENT/PLAN:  Encounter for gynecological examination without abnormal finding    Visit for screening mammogram  -     Cancel: Mammo Digital Screening Bilat With CAD; Future; Expected date: 08/23/2018    Lichen sclerosus et atrophicus  -     clobetasol (TEMOVATE) 0.05 % cream; APPLY 1/2 GRAM TOPICALLY twice weekly  Dispense: 30 g; Refill: 1    Atrophic vaginitis  -     estradiol (ESTRACE) 0.01 % (0.1 mg/gram) vaginal cream; PLACE 1 GRAM VAGINALLY twice weekly  Dispense: 42.5 g; Refill: 1    Postmenopausal HRT (hormone replacement therapy)  -     estradiol (ESTRACE) 0.01 % (0.1 mg/gram) vaginal cream; PLACE 1 GRAM VAGINALLY twice weekly  Dispense: 42.5 g; Refill: 1    Postcoital bleeding  -     estradiol (ESTRACE) 0.01 % (0.1 mg/gram) vaginal cream; PLACE 1 GRAM VAGINALLY twice weekly  Dispense: 42.5 g; Refill: 1    Allergic contact dermatitis, unspecified trigger  -     clobetasol (TEMOVATE) 0.05 % cream; APPLY 1/2 GRAM TOPICALLY twice weekly  Dispense: 30 g; Refill: 1    Menopause  -     DXA Bone Density Spine And Hip; Future; Expected date: 08/23/2018          Patient was counseled today on Pap guidelines, recommendation for yearly exams, mammograms every other year after the age of 40 and annually after the age of 50, Colonoscopy after the age of 50, Dexa Bone Scan and calcium and vitamin D supplementation in menopause and to see her PCP for other health maintenance.   FOLLOW-UP:prn

## 2018-09-26 ENCOUNTER — HOSPITAL ENCOUNTER (OUTPATIENT)
Dept: RADIOLOGY | Facility: HOSPITAL | Age: 68
Discharge: HOME | End: 2018-09-26
Attending: OBSTETRICS & GYNECOLOGY
Payer: MEDICARE

## 2018-09-26 DIAGNOSIS — Z78.0 MENOPAUSE: ICD-10-CM

## 2018-09-26 PROCEDURE — 77080 DXA BONE DENSITY AXIAL: CPT | Mod: TC,PO

## 2018-09-26 PROCEDURE — 77080 DXA BONE DENSITY AXIAL: CPT | Mod: 26,,, | Performed by: RADIOLOGY

## 2018-10-03 ENCOUNTER — CLINICAL SUPPORT (OUTPATIENT)
Dept: AUDIOLOGY | Facility: CLINIC | Age: 68
End: 2018-10-03
Payer: MEDICARE

## 2018-10-03 ENCOUNTER — OFFICE VISIT (OUTPATIENT)
Dept: OTOLARYNGOLOGY | Facility: CLINIC | Age: 68
End: 2018-10-03
Payer: MEDICARE

## 2018-10-03 VITALS
DIASTOLIC BLOOD PRESSURE: 68 MMHG | HEIGHT: 59 IN | BODY MASS INDEX: 34 KG/M2 | WEIGHT: 168.63 LBS | HEART RATE: 64 BPM | SYSTOLIC BLOOD PRESSURE: 136 MMHG

## 2018-10-03 DIAGNOSIS — J30.1 SEASONAL ALLERGIC RHINITIS DUE TO POLLEN: ICD-10-CM

## 2018-10-03 DIAGNOSIS — H93.13 TINNITUS, BILATERAL: Primary | ICD-10-CM

## 2018-10-03 DIAGNOSIS — R42 DISEQUILIBRIUM: ICD-10-CM

## 2018-10-03 DIAGNOSIS — G43.909 MIGRAINE WITHOUT STATUS MIGRAINOSUS, NOT INTRACTABLE, UNSPECIFIED MIGRAINE TYPE: ICD-10-CM

## 2018-10-03 DIAGNOSIS — H93.13 BILATERAL TINNITUS: ICD-10-CM

## 2018-10-03 DIAGNOSIS — Z01.10 PASSED HEARING SCREENING: ICD-10-CM

## 2018-10-03 DIAGNOSIS — H92.01 REFERRED OTALGIA, RIGHT: ICD-10-CM

## 2018-10-03 DIAGNOSIS — H92.01 OTALGIA, RIGHT EAR: Primary | ICD-10-CM

## 2018-10-03 DIAGNOSIS — H93.8X9 SENSATION OF FULLNESS IN EAR, UNSPECIFIED LATERALITY: Primary | ICD-10-CM

## 2018-10-03 PROCEDURE — 3075F SYST BP GE 130 - 139MM HG: CPT | Mod: CPTII,,, | Performed by: NURSE PRACTITIONER

## 2018-10-03 PROCEDURE — 99999 PR PBB SHADOW E&M-EST. PATIENT-LVL III: CPT | Mod: PBBFAC,,, | Performed by: NURSE PRACTITIONER

## 2018-10-03 PROCEDURE — 3078F DIAST BP <80 MM HG: CPT | Mod: CPTII,,, | Performed by: NURSE PRACTITIONER

## 2018-10-03 PROCEDURE — 99213 OFFICE O/P EST LOW 20 MIN: CPT | Mod: PBBFAC,27,PO,25 | Performed by: NURSE PRACTITIONER

## 2018-10-03 PROCEDURE — 99211 OFF/OP EST MAY X REQ PHY/QHP: CPT | Mod: PBBFAC,PO,25

## 2018-10-03 PROCEDURE — 92557 COMPREHENSIVE HEARING TEST: CPT | Mod: PBBFAC,PO | Performed by: AUDIOLOGIST

## 2018-10-03 PROCEDURE — 92567 TYMPANOMETRY: CPT | Mod: PBBFAC,PO | Performed by: AUDIOLOGIST

## 2018-10-03 PROCEDURE — 99999 PR PBB SHADOW E&M-EST. PATIENT-LVL I: CPT | Mod: PBBFAC,,,

## 2018-10-03 PROCEDURE — 99214 OFFICE O/P EST MOD 30 MIN: CPT | Mod: S$PBB,,, | Performed by: NURSE PRACTITIONER

## 2018-10-03 PROCEDURE — 1101F PT FALLS ASSESS-DOCD LE1/YR: CPT | Mod: CPTII,,, | Performed by: NURSE PRACTITIONER

## 2018-10-03 NOTE — PROGRESS NOTES
"Subjective:       Patient ID: Herminia Wooten is a 68 y.o. female.    Chief Complaint: Ear Fullness (right ear)    HPI   Patient saw Dr. Jorgensen < 2 months ago for allergic rhinitis; she was encouraged to continue Flonase, nasal saline rinsing, and see allergist for possible immunotherapy.   Patient was treated here for BPPV in 2016 & 2017.   Patient saw PCP on 7/17/18 for sinus pressure; CT sinus showed all sinuses to be open and clear.   Patient went to ED on 7/22/18 for dizziness and was instructed to f/u with ENT.   Patient saw [facility] on 8/16/18 and was negative for BPPV bilaterally. Her symptoms were not consistent with a vestibular disorder, and she was encouraged to return to primary care for next work-up.   Patient reported dizziness to her cardiologist on 8/29/18 who felt sinuses were most likely culprit. She returned to the ED on 9/12/18 for dizziness/syncope.   Patient is here today for right ear pain 2 weeks ago, better now. Tinnitus is worsening AU, most noticeable in the evening. Her last audiogram was in April 2014.   Patient reports feeling "cloudy-headed" mostly in the evening, which she attributes to allergies. She denies dizziness or true vertigo, just "cloudy." She has not yet seen an allergist. She has not yet seen neurologist for migraine management.     Review of Systems   Constitutional: Negative.    HENT: Positive for ear pain and tinnitus.    Eyes: Negative.    Respiratory: Negative.    Cardiovascular: Negative.    Gastrointestinal: Negative.    Musculoskeletal: Negative.    Skin: Negative.    Neurological: Positive for light-headedness and headaches.   Hematological: Negative.    Psychiatric/Behavioral: Negative.        Objective:      Physical Exam   Constitutional: She is oriented to person, place, and time. Vital signs are normal. She appears well-developed and well-nourished. She is cooperative. She does not appear ill. No distress.   HENT:   Head: Normocephalic and " "atraumatic.   Right Ear: Hearing, tympanic membrane, external ear and ear canal normal. Tympanic membrane is not erythematous. Tympanic membrane mobility is normal. No middle ear effusion.   Left Ear: Hearing, tympanic membrane, external ear and ear canal normal. Tympanic membrane is not erythematous. Tympanic membrane mobility is normal.  No middle ear effusion.   Nose: Nose normal.   Mouth/Throat: Uvula is midline, oropharynx is clear and moist and mucous membranes are normal.   Type "A" tympanogram consistent with well-pneumatized mesotympanum and absence of middle ear effusion AU   Eyes: EOM and lids are normal. Right eye exhibits no discharge. Left eye exhibits no discharge. No scleral icterus.   Neck: Trachea normal and normal range of motion. Neck supple. No tracheal deviation present. No thyroid mass and no thyromegaly present.   Cardiovascular: Normal rate.   Pulmonary/Chest: Effort normal. No stridor. No respiratory distress. She has no wheezes.   Musculoskeletal: Normal range of motion.   Lymphadenopathy:     She has no cervical adenopathy.   Neurological: She is alert and oriented to person, place, and time. She has normal strength. Coordination and gait normal.   Skin: Skin is warm, dry and intact. She is not diaphoretic. No cyanosis. No pallor.   Psychiatric: She has a normal mood and affect. Her speech is normal and behavior is normal. Judgment and thought content normal. Cognition and memory are normal.   Nursing note and vitals reviewed.       Assessment:     Hearing is WNL with excellent speech discrimination AU    Tinnitus AU  Otalgia not otogenic, most likely referred secondary to musculoskeletal etiology  Plan:     Patient should consider Dr. Jorgensen's recommendation of allergist for discussion of possible immunotherapy and continue daily Flonase for mild ETD.    Patient should consider establishing care with neurologist for management of chronic migraines and migraine-related disequilibrium. " Her symptoms are not consistent with vestibular disorder, nor are they sinus related as her sinus CT was negative.

## 2018-10-03 NOTE — PROGRESS NOTES
Herminia Wooten was seen 10/03/2018 for an audiological evaluation.     Patient complains of otalgia and bilateral tinnitus, mostly at night.  She was recently evaluated by me for BPPV on 8/16/18 and was negative for BPPV bilaterally. Symptoms did not sound consistent with a vestibular origin of dizziness at that time. She was seen in the ED on 9/12/18 for an episode of near syncope that occurred while riding in a car that resolved within a few seconds. ED diagnosis was a drop in blood sugar that resolved. Pt had not eaten lunch that day and had been working outside clearing property.     Results revealed essentially normal hearing bilaterally.  Speech Reception Thresholds were  20 dBHL for the right ear and 10 dBHL for the left ear.    Word recognition scores were excellent for the right ear and excellent for the left ear.   Tympanograms were Type A for the right ear and Type A for the left ear. Pt had mild ETD bilaterally due to a non-significant change in TM peak pressure from normal to swallow transition but with a normal peak pressure shift from swallow to valsalva transition.     Audiogram results were reviewed in detail with patient and all questions were answered. Results will be reviewed by ENT at the completion of this note.    Recommendations:   1. ENT consult

## 2018-10-11 ENCOUNTER — HOSPITAL ENCOUNTER (OUTPATIENT)
Dept: RADIOLOGY | Facility: HOSPITAL | Age: 68
Discharge: HOME | End: 2018-10-11
Attending: SPECIALIST
Payer: MEDICARE

## 2018-10-11 DIAGNOSIS — R10.9 FLANK PAIN: ICD-10-CM

## 2018-10-11 PROCEDURE — 76770 US EXAM ABDO BACK WALL COMP: CPT | Mod: 26,,, | Performed by: RADIOLOGY

## 2018-10-11 PROCEDURE — 76770 US EXAM ABDO BACK WALL COMP: CPT | Mod: TC,PO

## 2018-10-19 ENCOUNTER — HOSPITAL ENCOUNTER (OUTPATIENT)
Dept: RADIOLOGY | Facility: HOSPITAL | Age: 68
Discharge: HOME | End: 2018-10-19
Attending: SPECIALIST
Payer: MEDICARE

## 2018-10-19 DIAGNOSIS — R51.9 HEAD ACHE: ICD-10-CM

## 2018-10-19 PROCEDURE — 70553 MRI BRAIN STEM W/O & W/DYE: CPT | Mod: TC,PO

## 2018-10-19 PROCEDURE — 70544 MR ANGIOGRAPHY HEAD W/O DYE: CPT | Mod: TC,PO

## 2018-10-19 PROCEDURE — 25500020 PHARM REV CODE 255: Mod: PO

## 2018-10-19 PROCEDURE — 70553 MRI BRAIN STEM W/O & W/DYE: CPT | Mod: 26,,, | Performed by: RADIOLOGY

## 2018-10-19 PROCEDURE — A9585 GADOBUTROL INJECTION: HCPCS | Mod: PO

## 2018-10-19 RX ORDER — GADOBUTROL 604.72 MG/ML
7 INJECTION INTRAVENOUS
Status: COMPLETED | OUTPATIENT
Start: 2018-10-19 | End: 2018-10-19

## 2018-10-19 RX ADMIN — GADOBUTROL 7 ML: 604.72 INJECTION INTRAVENOUS at 03:10

## 2018-10-31 ENCOUNTER — TELEPHONE (OUTPATIENT)
Dept: GASTROENTEROLOGY | Facility: CLINIC | Age: 68
End: 2018-10-31

## 2018-10-31 NOTE — TELEPHONE ENCOUNTER
----- Message from Tomasz Cullen sent at 10/30/2018  1:28 PM CDT -----  Contact: Patient  Type: Needs Medical Advice    Who Called:  Patient  Best Call Back Number: [phone]  Additional Information: Patient has questions regarding medication for their liver.

## 2018-12-10 DIAGNOSIS — I25.10 CORONARY ATHEROSCLEROSIS OF NATIVE CORONARY ARTERY: Primary | ICD-10-CM

## 2018-12-10 DIAGNOSIS — R51.9 HA (HEADACHE): ICD-10-CM

## 2018-12-10 DIAGNOSIS — R09.89 ABNORMAL CHEST SOUNDS: ICD-10-CM

## 2018-12-18 ENCOUNTER — CLINICAL SUPPORT (OUTPATIENT)
Dept: CARDIOLOGY | Facility: CLINIC | Age: 68
End: 2018-12-18
Attending: SPECIALIST
Payer: MEDICARE

## 2018-12-18 VITALS — HEIGHT: 59 IN | BODY MASS INDEX: 34.06 KG/M2

## 2018-12-18 DIAGNOSIS — R51.9 HA (HEADACHE): ICD-10-CM

## 2018-12-18 DIAGNOSIS — R09.89 ABNORMAL CHEST SOUNDS: ICD-10-CM

## 2018-12-18 DIAGNOSIS — I25.10 CORONARY ATHEROSCLEROSIS OF NATIVE CORONARY ARTERY: ICD-10-CM

## 2018-12-18 PROCEDURE — 93306 TTE W/DOPPLER COMPLETE: CPT | Mod: S$GLB,,, | Performed by: INTERNAL MEDICINE

## 2018-12-18 PROCEDURE — 99999 PR PBB SHADOW E&M-EST. PATIENT-LVL II: CPT | Mod: PBBFAC,,,

## 2018-12-19 LAB
ASCENDING AORTA: 2.53 CM
AV INDEX (PROSTH): 0.77
AV MEAN GRADIENT: 9.02 MMHG
AV PEAK GRADIENT: 15.05 MMHG
AV VALVE AREA: 2.38 CM2
BSA FOR ECHO PROCEDURE: 1.78 M2
CV ECHO LV RWT: 0.5 CM
DOP CALC AO PEAK VEL: 1.94 M/S
DOP CALC AO VTI: 46.05 CM
DOP CALC LVOT AREA: 3.08 CM2
DOP CALC LVOT DIAMETER: 1.98 CM
DOP CALC LVOT STROKE VOLUME: 109.62 CM3
DOP CALCLVOT PEAK VEL VTI: 35.62 CM
E WAVE DECELERATION TIME: 142.13 MSEC
E/A RATIO: 1.18
E/E' RATIO: 10.71
ECHO LV POSTERIOR WALL: 1 CM (ref 0.6–1.1)
FRACTIONAL SHORTENING: 41 % (ref 28–44)
INTERVENTRICULAR SEPTUM: 1.12 CM (ref 0.6–1.1)
IVRT: 0.08 MSEC
LA MAJOR: 4.61 CM
LA MINOR: 4.65 CM
LA WIDTH: 3.95 CM
LEFT ATRIUM SIZE: 3.8 CM
LEFT ATRIUM VOLUME INDEX: 34.5 ML/M2
LEFT ATRIUM VOLUME: 59.07 CM3
LEFT INTERNAL DIMENSION IN SYSTOLE: 2.38 CM (ref 2.1–4)
LEFT VENTRICLE DIASTOLIC VOLUME INDEX: 41.84 ML/M2
LEFT VENTRICLE DIASTOLIC VOLUME: 71.64 ML
LEFT VENTRICLE MASS INDEX: 81.9 G/M2
LEFT VENTRICLE SYSTOLIC VOLUME INDEX: 11.5 ML/M2
LEFT VENTRICLE SYSTOLIC VOLUME: 19.77 ML
LEFT VENTRICULAR INTERNAL DIMENSION IN DIASTOLE: 4.04 CM (ref 3.5–6)
LEFT VENTRICULAR MASS: 140.21 G
LV LATERAL E/E' RATIO: 8.27
LV SEPTAL E/E' RATIO: 15.17
MV PEAK A VEL: 0.77 M/S
MV PEAK E VEL: 0.91 M/S
PISA TR MAX VEL: 2.5 M/S
PULM VEIN S/D RATIO: 1.5
PV PEAK D VEL: 0.52 M/S
PV PEAK S VEL: 0.78 M/S
RA MAJOR: 4.25 CM
RA PRESSURE: 3 MMHG
RA WIDTH: 2.81 CM
RV TISSUE DOPPLER FREE WALL SYSTOLIC VELOCITY 1 (APICAL 4 CHAMBER VIEW): 16.14 M/S
SINUS: 2.61 CM
STJ: 2.52 CM
TDI LATERAL: 0.11
TDI SEPTAL: 0.06
TDI: 0.09
TR MAX PG: 25 MMHG
TRICUSPID ANNULAR PLANE SYSTOLIC EXCURSION: 2.05 CM
TV REST PULMONARY ARTERY PRESSURE: 28 MMHG

## 2019-02-28 ENCOUNTER — OFFICE VISIT (OUTPATIENT)
Dept: GASTROENTEROLOGY | Facility: CLINIC | Age: 69
End: 2019-02-28
Payer: MEDICARE

## 2019-02-28 ENCOUNTER — HOSPITAL ENCOUNTER (OUTPATIENT)
Dept: RADIOLOGY | Facility: HOSPITAL | Age: 69
Discharge: HOME | End: 2019-02-28
Attending: NURSE PRACTITIONER
Payer: MEDICARE

## 2019-02-28 VITALS
SYSTOLIC BLOOD PRESSURE: 130 MMHG | WEIGHT: 167.75 LBS | HEART RATE: 71 BPM | HEIGHT: 59 IN | RESPIRATION RATE: 19 BRPM | DIASTOLIC BLOOD PRESSURE: 57 MMHG | BODY MASS INDEX: 33.82 KG/M2

## 2019-02-28 DIAGNOSIS — K59.00 CONSTIPATION, UNSPECIFIED CONSTIPATION TYPE: ICD-10-CM

## 2019-02-28 DIAGNOSIS — R10.13 EPIGASTRIC PAIN: ICD-10-CM

## 2019-02-28 DIAGNOSIS — F11.90 OPIOID USE: ICD-10-CM

## 2019-02-28 DIAGNOSIS — R79.89 ELEVATED LIVER FUNCTION TESTS: ICD-10-CM

## 2019-02-28 DIAGNOSIS — R11.0 NAUSEA: Primary | ICD-10-CM

## 2019-02-28 DIAGNOSIS — Z87.19 HISTORY OF GASTROESOPHAGEAL REFLUX (GERD): ICD-10-CM

## 2019-02-28 PROCEDURE — 74019 XR ABDOMEN FLAT AND ERECT: ICD-10-PCS | Mod: 26,,, | Performed by: RADIOLOGY

## 2019-02-28 PROCEDURE — 3078F DIAST BP <80 MM HG: CPT | Mod: CPTII,S$GLB,, | Performed by: NURSE PRACTITIONER

## 2019-02-28 PROCEDURE — 3078F PR MOST RECENT DIASTOLIC BLOOD PRESSURE < 80 MM HG: ICD-10-PCS | Mod: CPTII,S$GLB,, | Performed by: NURSE PRACTITIONER

## 2019-02-28 PROCEDURE — 1101F PR PT FALLS ASSESS DOC 0-1 FALLS W/OUT INJ PAST YR: ICD-10-PCS | Mod: CPTII,S$GLB,, | Performed by: NURSE PRACTITIONER

## 2019-02-28 PROCEDURE — 74019 RADEX ABDOMEN 2 VIEWS: CPT | Mod: TC,FY,PO

## 2019-02-28 PROCEDURE — 99999 PR PBB SHADOW E&M-EST. PATIENT-LVL V: CPT | Mod: PBBFAC,,, | Performed by: NURSE PRACTITIONER

## 2019-02-28 PROCEDURE — 3075F PR MOST RECENT SYSTOLIC BLOOD PRESS GE 130-139MM HG: ICD-10-PCS | Mod: CPTII,S$GLB,, | Performed by: NURSE PRACTITIONER

## 2019-02-28 PROCEDURE — 74019 RADEX ABDOMEN 2 VIEWS: CPT | Mod: 26,,, | Performed by: RADIOLOGY

## 2019-02-28 PROCEDURE — 99999 PR PBB SHADOW E&M-EST. PATIENT-LVL V: ICD-10-PCS | Mod: PBBFAC,,, | Performed by: NURSE PRACTITIONER

## 2019-02-28 PROCEDURE — 1101F PT FALLS ASSESS-DOCD LE1/YR: CPT | Mod: CPTII,S$GLB,, | Performed by: NURSE PRACTITIONER

## 2019-02-28 PROCEDURE — 3075F SYST BP GE 130 - 139MM HG: CPT | Mod: CPTII,S$GLB,, | Performed by: NURSE PRACTITIONER

## 2019-02-28 PROCEDURE — 99214 OFFICE O/P EST MOD 30 MIN: CPT | Mod: S$GLB,,, | Performed by: NURSE PRACTITIONER

## 2019-02-28 PROCEDURE — 99214 PR OFFICE/OUTPT VISIT, EST, LEVL IV, 30-39 MIN: ICD-10-PCS | Mod: S$GLB,,, | Performed by: NURSE PRACTITIONER

## 2019-02-28 RX ORDER — CELECOXIB 200 MG/1
CAPSULE ORAL
Refills: 0 | Status: ON HOLD | COMMUNITY
Start: 2019-02-12 | End: 2019-03-13

## 2019-02-28 RX ORDER — OMEPRAZOLE 40 MG/1
40 CAPSULE, DELAYED RELEASE ORAL DAILY
Qty: 30 CAPSULE | Refills: 5 | Status: SHIPPED | OUTPATIENT
Start: 2019-02-28 | End: 2020-06-17 | Stop reason: SDUPTHER

## 2019-02-28 RX ORDER — BETAMETHASONE VALERATE 1.2 MG/G
1 CREAM TOPICAL DAILY PRN
COMMUNITY

## 2019-02-28 RX ORDER — HYDROCODONE BITARTRATE AND ACETAMINOPHEN 10; 325 MG/1; MG/1
1 TABLET ORAL EVERY 8 HOURS PRN
COMMUNITY
Start: 2019-02-24 | End: 2020-06-09

## 2019-02-28 RX ORDER — DOCUSATE SODIUM 100 MG/1
100 CAPSULE, LIQUID FILLED ORAL 2 TIMES DAILY
Status: ON HOLD | COMMUNITY
End: 2019-03-13

## 2019-02-28 RX ORDER — POLYETHYLENE GLYCOL 3350 17 G/17G
17 POWDER, FOR SOLUTION ORAL DAILY PRN
COMMUNITY

## 2019-02-28 RX ORDER — ONDANSETRON 4 MG/1
TABLET, ORALLY DISINTEGRATING ORAL
Refills: 0 | COMMUNITY
Start: 2019-02-04 | End: 2020-06-09

## 2019-02-28 NOTE — PROGRESS NOTES
Subjective:       Patient ID: Herminia Wooten is a 68 y.o. female Body mass index is 33.89 kg/m².    Chief Complaint: Nausea (med check)  Established patient of Dr. Luna & myself.    GI Problem   The primary symptoms include fatigue (not sleeping well due to knee and back pain), abdominal pain, nausea, vomiting (started the first week after her knee surgery which was 2/6/19; emesis of stomach contents and recently ingested food; has resolved) and diarrhea (yesterday thinks it was after taking gaviscon). Primary symptoms do not include fever, weight loss, melena, hematemesis, hematochezia or dysuria.   The abdominal pain began more than 2 days ago. The abdominal pain is located in the epigastric region (described as cramping).   Nausea began more than 1 week ago (started a few weeks before knee surgery which was on 2/6/19; has worsened since knee surgery). The nausea is exacerbated by food.   The illness is also significant for chills, bloating, constipation (norco q 7 hours; bowel movements yesterday had 4 loose stools but reports has to strain to have a bowel movement; having daily bowel movements; soft stools due to medications; TREATMENT: miralax 17 grams daily, colace 100 mg daily, enemas help) and back pain (seeing therapist later this week for it). The illness does not include dysphagia or odynophagia. Significant associated medical issues include GERD (heartburn occasional; zantac 150 mg once daily prn and dietary measures; PAST TREATMENTS: protonix), bowel resection (colectomy due to endometriosis & rectocele repair), irritable bowel syndrome (PAST TREATMENT: bentyl), hemorrhoids and diverticulitis.     Review of Systems   Constitutional: Positive for chills and fatigue (not sleeping well due to knee and back pain). Negative for appetite change, fever and weight loss.   HENT: Negative for sore throat and trouble swallowing.    Respiratory: Negative for cough, choking and shortness of breath.     Cardiovascular: Negative for chest pain.   Gastrointestinal: Positive for abdominal pain, bloating, constipation (norco q 7 hours; bowel movements yesterday had 4 loose stools but reports has to strain to have a bowel movement; having daily bowel movements; soft stools due to medications; TREATMENT: miralax 17 grams daily, colace 100 mg daily, enemas help), diarrhea (yesterday thinks it was after taking gaviscon), nausea and vomiting (started the first week after her knee surgery which was 2/6/19; emesis of stomach contents and recently ingested food; has resolved). Negative for anal bleeding, blood in stool, dysphagia, hematemesis, hematochezia, melena and rectal pain.   Genitourinary: Negative for difficulty urinating, dysuria and flank pain.   Musculoskeletal: Positive for back pain (seeing therapist later this week for it).   Neurological: Negative for weakness.   Psychiatric/Behavioral: Positive for sleep disturbance. The patient is nervous/anxious.        Patient's last menstrual period was 10/07/1976. s/p hysterectomy    Past Medical History:   Diagnosis Date    Coronary artery disease     Diverticulitis     Diverticulosis     Fatty liver 4/25/2016    GERD (gastroesophageal reflux disease)     Hyperlipidemia     Hypertension     Hypothyroid     Irritable bowel syndrome     Liver hemangioma     Thyroid disease      Past Surgical History:   Procedure Laterality Date    ABDOMINAL SURGERY      APPENDECTOMY      CARDIAC CATHETERIZATION      2 stents    COLECTOMY      endometriosis- 8.5 inches removed    COLONOSCOPY  2016    repeat in 5    COLONOSCOPY N/A 6/27/2016    [facility] by [facility]    ESOPHAGOGASTRODUODENOSCOPY (EGD) N/A 6/27/2016    Performed by [facility] Jr[facility] at [facility]    HYSTERECTOMY      INCONTINENCE SURGERY      OOPHORECTOMY      rectocele  05/2017    repair    STENT LAD      Stent OM      UPPER GASTROINTESTINAL ENDOSCOPY  6/2013   [place]    reflux esophagitis; biopsy: negative dysplasia, intestinal metaplasia; mild chronic inflammation- GERD related & regenerative glandular epithelial change; strips of squamous esophageal mucosa with moderate basal epithelial cell hyperplasia and rare intraepithelial eosinophils (< 1 per 10 high power fields)    UPPER GASTROINTESTINAL ENDOSCOPY  06/2016    Dr. de la cruz    URETHRA SURGERY       Family History   Problem Relation Age of Onset    Clotting disorder Maternal Grandmother     Diverticulitis Maternal Grandfather     Anesthesia problems Neg Hx     Breast cancer Neg Hx     Ovarian cancer Neg Hx     Colon cancer Neg Hx     Colon polyps Neg Hx     Crohn's disease Neg Hx     Ulcerative colitis Neg Hx      Wt Readings from Last 10 Encounters:   02/28/19 76.1 kg (167 lb 12.3 oz)   12/18/18 (P) 76.2 kg (168 lb)   10/03/18 76.5 kg (168 lb 10.4 oz)   09/12/18 76.2 kg (167 lb 15.9 oz)   08/29/18 75.3 kg (166 lb)   08/23/18 76.7 kg (169 lb)   08/23/18 77 kg (169 lb 12.1 oz)   08/07/18 76.9 kg (169 lb 8.5 oz)   07/22/18 76.8 kg (169 lb 5 oz)   07/17/18 77.9 kg (171 lb 11.8 oz)     Lab Results   Component Value Date    WBC 9.00 09/12/2018    HGB 13.8 09/12/2018    HCT 42.6 09/12/2018    MCV 90 09/12/2018     09/12/2018     CMP  Sodium   Date Value Ref Range Status   09/12/2018 139 136 - 145 mmol/L Final     Potassium   Date Value Ref Range Status   09/12/2018 4.3 3.5 - 5.1 mmol/L Final     Chloride   Date Value Ref Range Status   09/12/2018 103 95 - 110 mmol/L Final     CO2   Date Value Ref Range Status   09/12/2018 27 22 - 31 mmol/L Final     Glucose   Date Value Ref Range Status   09/12/2018 100 70 - 110 mg/dL Final     Comment:     The ADA recommends the following guidelines for fasting glucose:  Normal:       less than 100 mg/dL  Prediabetes:  100 mg/dL to 125 mg/dL  Diabetes:     126 mg/dL or higher       BUN, Bld   Date Value Ref Range Status   09/12/2018 16 7 - 18 mg/dL Final     Creatinine  "  Date Value Ref Range Status   09/12/2018 0.65 0.50 - 1.40 mg/dL Final     Calcium   Date Value Ref Range Status   09/12/2018 9.8 8.4 - 10.2 mg/dL Final     Total Protein   Date Value Ref Range Status   09/12/2018 7.6 6.0 - 8.4 g/dL Final     Albumin   Date Value Ref Range Status   09/12/2018 4.6 3.5 - 5.2 g/dL Final     Total Bilirubin   Date Value Ref Range Status   09/12/2018 0.7 0.2 - 1.3 mg/dL Final     Alkaline Phosphatase   Date Value Ref Range Status   09/12/2018 111 38 - 145 U/L Final     AST   Date Value Ref Range Status   09/12/2018 39 (H) 14 - 36 U/L Final     ALT   Date Value Ref Range Status   09/12/2018 69 (H) 10 - 44 U/L Final     Anion Gap   Date Value Ref Range Status   09/12/2018 9 8 - 16 mmol/L Final     eGFR if    Date Value Ref Range Status   09/12/2018 >60 >60 mL/min/1.73 m^2 Final     eGFR if non    Date Value Ref Range Status   09/12/2018 >60 >60 mL/min/1.73 m^2 Final     Comment:     Calculation used to obtain the estimated glomerular filtration  rate (eGFR) is the CKD-EPI equation.        Reviewed prior medical records including radiology report of 4/25/17 CT abdomen/pelvis, 11/7/17 abdominal ultrasound, 5/10/17 MRI abdomen; & endoscopy history (see surgical history).    6/27/2016 EGD was reviewed and procedure report states:   " Impression:          - Normal oropharynx.                       - Reflux esophagitis.                       - Normal esophagus otherwise.                       - Z-line regular, 35 cm from the incisors.                       - Small hiatus hernia.                       - Gastritis. Biopsied.                       - Normal stomach otherwise.                       - Normal examined duodenum.  Recommendation:      - Perform a colonoscopy as previously scheduled.                       - Await pathology and CLOtest results.                       - Follow an antireflux regimen.                       - Use Protonix (pantoprazole) 40 mg PO " "daily.                       - Use Zantac (ranitidine) 300 mg PO daily.                       - Call the G.I. clinic in 2 weeks for reports (if                        you haven't heard from us sooner) [phone].                       - Return to GI clinic in 6-8 weeks. ".  Biopsy results:   "1. STOMACH (BIOPSY):  -Antrum with features of reactive/chemical gastropathy  -Negative for active inflammation  -Negative for Helicobacter pylori organisms on H&E stain"    6/27/2016 Colonoscopy was reviewed and procedure report states:   " Impression:          - One 2 to 3 mm polyp in the proximal sigmoid colon.                        Resected and retrieved.                       - One 1 to 2 mm polyp in the mid ascending colon.                        Resected and retrieved.                       - Diverticulosis in the sigmoid colon and in the                        descending colon.                       - Mild colonic spasm consistent with irritable bowel                        syndrome.                       - Internal hemorrhoids.                       - The examination was otherwise normal.                       - The examined portion of the ileum was normal.                       - Fluid aspiration performed.  Recommendation:      - Discharge patient to home.                       - Await pathology results.                       - If the pathology report reveals adenomatous                        tissue, then repeat the colonoscopy for surveillance                        in 5 years.                       - If the pathology report indicates hyperplastic                        polyp, then repeat colonoscopy for surveillance in 6                        years.                       - High fiber diet.                       - Use fiber, for example Citrucel, Fibercon, Konsyl                        or Metamucil.                       - Take a PROBIOTIC, such as a carton of GREEK YOGURT                        (Chobani or Oikos, " "or Activia or Dannon); or tablets                        of ALIGN or CULTURELLE or NBA-Q (all                        non-prescription), every day for a month.                       - Continue present medications.                       - Use Bentyl (dicyclomine) 10-20 mg PO QID 30 min AC.                       - Call the G.I. clinic in 2 weeks for reports (if                        you haven't heard from us sooner) [phone].                       - Return to GI clinic in 6-8 weeks.                       - Patient has a contact number available for                        emergencies. The signs and symptoms of potential                        delayed complications were discussed with the                        patient. Return to normal activities tomorrow.                        Written discharge instructions were provided to the                        patient.                       - Return to normal activities tomorrow. ".  Biopsy results:   "2. COLON, ASCENDING POLYP (BIOPSY):  - Tubular adenoma  3. COLON, SIGMOID POLYP (BIOPSY):  - Tubular adenoma"  Objective:      Physical Exam   Constitutional: She is oriented to person, place, and time. She appears well-developed and well-nourished. No distress.   Patient using walker for assistance with ambulation   HENT:   Mouth/Throat: Mucous membranes are normal. No oral lesions. No oropharyngeal exudate.   Eyes: Conjunctivae are normal. Pupils are equal, round, and reactive to light. No scleral icterus.   Pulmonary/Chest: Effort normal and breath sounds normal. No respiratory distress. She has no wheezes.   Abdominal: Soft. Normal appearance and bowel sounds are normal. She exhibits no distension, no abdominal bruit and no mass. There is tenderness (mild) in the epigastric area. There is no rigidity, no rebound, no guarding, no tenderness at McBurney's point and negative Leyva's sign.   Neurological: She is alert and oriented to person, place, and time.   Skin: Skin is " warm and dry. No rash noted. She is not diaphoretic. No erythema. No pallor.   Non-jaundiced   Psychiatric: She has a normal mood and affect. Her behavior is normal.   Patient tearful during history portion of visit.   Nursing note and vitals reviewed.      Assessment:       1. Nausea    2. History of gastroesophageal reflux (GERD)    3. Opioid use    4. Constipation, unspecified constipation type    5. Epigastric pain    6. Elevated liver function tests        Plan:       Nausea  -     Lipase; Future; Expected date: 02/28/2019  -      Abdomen Complete; Future; Expected date: 02/28/2019  -  START   omeprazole (PRILOSEC) 40 MG capsule; Take 1 capsule (40 mg total) by mouth once daily.  Dispense: 30 capsule; Refill: 5  - schedule EGD, discussed procedure with patient, patient verbalized understanding  - continue zofran prn as directed    History of gastroesophageal reflux (GERD)  - continue zantac 150 mg bid prn as directed  - START    omeprazole (PRILOSEC) 40 MG capsule; Take 1 capsule (40 mg total) by mouth once daily.  Dispense: 30 capsule; Refill: 5, take in the morning 30-60 minutes before breakfast, discussed about possible long term use of medication (prefer to use lowest effective dose or discontinuing if possible) and discussed the risks & benefits with taking a reflux medication long term, and to take OTC calcium and vitamin d supplements as directed (such as Citracal +D), pt verbalized understanding  -discussed about the different types of medications used to treat reflux and how to use them, antacids can be used PRN for breakthrough heartburn symptoms by reducing stomach acid that is already produced, H2 blockers (zantac) work by limiting the amount acid production, & PPI's work to block acid production and are taken daily, patient verbalized understanding.  -Educated patient on lifestyle modifications to help control/reduce reflux/abdominal pain including: avoid large meals, avoid eating within 2-3  hours of bedtime (avoid late night eating & lying down soon after eating), elevate head of bed if nocturnal symptoms are present, smoking cessation (if current smoker), & weight loss (if overweight).   -Educated to avoid known foods which trigger reflux symptoms & to minimize/avoid high-fat foods, chocolate, caffeine, citrus, alcohol, & tomato products.  -Advised to avoid/limit use of NSAID's, since they can cause GI upset, bleeding, and/or ulcers. If needed, take with food.   - schedule EGD, discussed procedure with patient, patient verbalized understanding    Opioid use  - discussed with patient that some side effects of narcotic pain medications are constipation & nausea, advised patient to talk to provider who manages pain medication, patient verbalized understanding    Constipation, unspecified constipation type  -     X-Ray Abdomen Flat And Erect; Future; Expected date: 02/28/2019  - START    linaclotide (LINZESS) 145 mcg Cap capsule; Take 1 capsule (145 mcg total) by mouth once daily. Take >30 minutes before 1st meal of the day.  Dispense: 30 capsule; Refill: 2  Recommend daily exercise as tolerated, adequate water intake (six 8-oz glasses of water daily), and high fiber diet. OTC fiber supplements are recommended if diet does not reach daily fiber goal (20-30 grams daily), such as Metamucil, Citrucel, or FiberCon (take as directed, separate from other oral medications by >2 hours).  -Recommend taking an OTC stool softener such as Colace as directed to avoid hard stools and straining with bowel movements PRN  -Recommend trying OTC MiraLax once daily (17g PO) as directed PRN BREAKTHROUGH CONSTIPATION  - If no improvement with above recommendations, try intermittently dosed Dulcolax OTC as directed (every 3-4  days) PRN to facilitate bowel movements  -If still no improvement with these measures, call/follow-up  - schedule Colonoscopy, discussed procedure with the patient, patient verbalized  understanding    Epigastric pain  -     CBC auto differential; Future; Expected date: 02/28/2019  -     Comprehensive metabolic panel; Future; Expected date: 02/28/2019  -     Lipase; Future; Expected date: 02/28/2019  -     US Abdomen Complete; Future; Expected date: 02/28/2019  - schedule EGD, discussed procedure with patient, patient verbalized understanding    Elevated liver function tests   minimize/avoid alcohol and tylenol products, & follow-up with PCP for continued evaluation and management; if specialist is needed, recommend seeing hepatology.  -     Comprehensive metabolic panel; Future; Expected date: 02/28/2019    Follow-up in about 1 month (around 3/28/2019), or if symptoms worsen or fail to improve.    If no improvement in symptoms or symptoms worsen, call/follow-up at clinic or go to ER.

## 2019-03-01 ENCOUNTER — TELEPHONE (OUTPATIENT)
Dept: FAMILY MEDICINE | Facility: CLINIC | Age: 69
End: 2019-03-01

## 2019-03-04 ENCOUNTER — TELEPHONE (OUTPATIENT)
Dept: GASTROENTEROLOGY | Facility: CLINIC | Age: 69
End: 2019-03-04

## 2019-03-04 ENCOUNTER — HOSPITAL ENCOUNTER (OUTPATIENT)
Dept: RADIOLOGY | Facility: HOSPITAL | Age: 69
Discharge: HOME | End: 2019-03-04
Attending: NURSE PRACTITIONER
Payer: MEDICARE

## 2019-03-04 DIAGNOSIS — R11.0 NAUSEA: ICD-10-CM

## 2019-03-04 DIAGNOSIS — R10.13 EPIGASTRIC PAIN: ICD-10-CM

## 2019-03-04 PROCEDURE — 76700 US ABDOMEN COMPLETE: ICD-10-PCS | Mod: 26,,, | Performed by: RADIOLOGY

## 2019-03-04 PROCEDURE — 76700 US EXAM ABDOM COMPLETE: CPT | Mod: TC,PO

## 2019-03-04 PROCEDURE — 76700 US EXAM ABDOM COMPLETE: CPT | Mod: 26,,, | Performed by: RADIOLOGY

## 2019-03-04 NOTE — TELEPHONE ENCOUNTER
----- Message from Hawa Penaloza sent at 3/4/2019 12:16 PM CST -----  Type:  Patient Returning Call    Who Called:  Patient  Who Left Message for Patient:  Tia  Does the patient know what this is regarding?:  yes  Best Call Back Number:  [phone] (home)     Additional Information:  Na

## 2019-03-04 NOTE — TELEPHONE ENCOUNTER
----- Message from Hawa Penaloza sent at 3/4/2019  1:27 PM CST -----  Type:  Test Results    Who Called: Patient  Name of Test (Lab/Mammo/Etc):  u/s  Date of Test:  3/4/19  Ordering Provider:  Roberto Carlos  Where the test was performed:  [facility]  Best Call Back Number:  [phone] (home)     Additional Information:  Na

## 2019-03-04 NOTE — TELEPHONE ENCOUNTER
Please call to inform & review the results with the patient- radiology report of the Abdominal x-ray showed unremarkable bowel gas pattern, no signs of intestinal obstruction and moderate amount of stool in the colon, especially in the right colon, which is consistent with/suggest history of constipation.  Recommend trying an OTC enema as directed to help clear the stool from the colon.    Other findings showed degenerative changes of the spine and right hip area tendinitis: Recommend follow-up with Primary Care Provider/Ortho for continued evaluation and management.    Blood work showed normal liver function and kidney function levels; electrolytes showed elevated glucose and potassium levels (can be affected if non-fasting): Recommend follow-up with Primary Care Provider for continued evaluation and management of these findings. Normal pancreatic enzyme; blood counts showed mild decrease in RBC and hemoglobin levels: Recommend follow-up with Primary Care Provider for continued evaluation and management of mild anemia. Otherwise unremarkable findings.  Continue with previous recommendations. If no improvement in symptoms or symptoms worsen, call/follow-up at clinic or go to ER.  Please release results to patient's mychart once you have discussed results and recommendations with patient.  Thanks,  Lesvia FITZPATRICK

## 2019-03-04 NOTE — TELEPHONE ENCOUNTER
Spoke with pt and informed we would call her with the ultrasound results as soon as they are reviewed and released. Pt verbalized understanding.

## 2019-03-04 NOTE — TELEPHONE ENCOUNTER
Spoke with pt and informed of results and recommendations per Jelena Azevedo NP. Pt verbalized understanding.

## 2019-03-06 ENCOUNTER — PATIENT MESSAGE (OUTPATIENT)
Dept: GASTROENTEROLOGY | Facility: CLINIC | Age: 69
End: 2019-03-06

## 2019-03-06 ENCOUNTER — TELEPHONE (OUTPATIENT)
Dept: GASTROENTEROLOGY | Facility: CLINIC | Age: 69
End: 2019-03-06

## 2019-03-06 DIAGNOSIS — K82.4 GALLBLADDER POLYP: Primary | ICD-10-CM

## 2019-03-06 DIAGNOSIS — R11.0 NAUSEA: ICD-10-CM

## 2019-03-06 DIAGNOSIS — R10.13 EPIGASTRIC PAIN: ICD-10-CM

## 2019-03-06 NOTE — TELEPHONE ENCOUNTER
S/w pt She is extremely anxious and would like to know the results of U/S. I explained to her that the test was just done on Monday and with Tuesday being a holiday that it would take a little longer to get results than normal. I informed her that the radiologist has to result it then it is sent to the providers for review and that I would check with you to see if you have received them. Please advise.

## 2019-03-06 NOTE — TELEPHONE ENCOUNTER
Spoke to pt regarding u/s recommendations/ verbs understanding. Instr she can see any surgeon she wishes

## 2019-03-06 NOTE — TELEPHONE ENCOUNTER
"Please call to inform & review the results with the patient- radiology report of the abdominal ultrasound showed "Fatty liver" seen previously; For fatty liver recommend: low fat, low cholesterol diet, maintain good control of blood sugars and cholesterol levels, exercise, weight loss (if overweight), minimize/avoid alcohol and tylenol products, & follow-up with PCP for continued evaluation and management; if specialist is needed, recommend seeing hepatology.    "Small hepatic nodule in the anterior segment of the right hepatic lobe which is probably a hemangioma and is unchanged from prior studies": this is typically a benign collection of blood vessels & is unchanged when compared to prior imaging.    "Small gallbladder polyp":  - recommend low fat diet  - recommend seeing general surgery for further evaluation and management of this finding (referral placed).    "Small nonobstructing left renal calculus" & "Small left renal cyst": Recommend follow-up with Primary Care Provider/urology for continued evaluation and management of these findings.  Otherwise, unremarkable findings.    Continue with previous recommendations. If no improvement in symptoms or symptoms worsen, call/follow-up at clinic or go to ER.  Please release results to patient's mychart once you have discussed results and recommendations with patient.  Thanks,  Lesvia CAMPBELL-KATHRYN    "

## 2019-03-06 NOTE — TELEPHONE ENCOUNTER
----- Message from Jose Juan Billings sent at 3/6/2019  9:14 AM CST -----  Contact: Patient  Advised needs to speak with the nurse regarding her test results.  Please call [phone]

## 2019-03-08 ENCOUNTER — TELEPHONE (OUTPATIENT)
Dept: SURGERY | Facility: CLINIC | Age: 69
End: 2019-03-08

## 2019-03-08 ENCOUNTER — TELEPHONE (OUTPATIENT)
Dept: GASTROENTEROLOGY | Facility: CLINIC | Age: 69
End: 2019-03-08

## 2019-03-08 NOTE — TELEPHONE ENCOUNTER
Returned pt call advised pt that 03/18/19 is the earliest appointment that I can offer,pt verbalized understanding

## 2019-03-08 NOTE — TELEPHONE ENCOUNTER
----- Message from Che Ibrahim sent at 3/8/2019 11:41 AM CST -----  Contact: [phone]  Patient is requesting a call back from the nurse requesting nurse to set her up with Dr. Solorio and she always stated to call her back stated the medication given and not working well.    Please call the patient upon request at phone number [phone].

## 2019-03-08 NOTE — TELEPHONE ENCOUNTER
----- Message from Henok Rodriguez sent at 3/8/2019  1:18 PM CST -----  Type:  Patient Call Back    Who Called:  pt  Does the patient know what this is regarding?: pt scheduled a consult appointment regarding her gallbladder on 03/18/19 but would like to be seen sooner. Please contact pt if you can schedule her a sooner appointment as soon as possible.  Best Call Back Number: [phone]   Additional Information:  Please call pt and leave a detailed message if there is no answer.

## 2019-03-08 NOTE — TELEPHONE ENCOUNTER
Spoke with pt and discussed concerns and informed pt that we couldn't make her an earlier appt than was offered to her by general surgery. Pt verbalized understand.

## 2019-03-12 PROBLEM — R10.11 POSTPRANDIAL RUQ PAIN: Status: ACTIVE | Noted: 2019-03-12

## 2019-03-14 PROBLEM — K81.1 CHOLECYSTITIS, CHRONIC: Status: ACTIVE | Noted: 2019-03-14

## 2019-03-14 PROBLEM — K82.4 GALLBLADDER POLYP: Status: ACTIVE | Noted: 2019-03-14

## 2019-03-25 ENCOUNTER — TELEPHONE (OUTPATIENT)
Dept: GASTROENTEROLOGY | Facility: CLINIC | Age: 69
End: 2019-03-25

## 2019-03-25 NOTE — TELEPHONE ENCOUNTER
Pt states Dr. Ortiz is requesting a phone call from Jelena Azevedo NP to discuss gall bladder. Pt was informed she would need to see general surgery for gall bladder, but still requested a call from Jelena Azevedo NP. Pt also stated she would like to be put back on Carafate.

## 2019-03-25 NOTE — TELEPHONE ENCOUNTER
Spoke with pt and informed her that a message was sent to Jelena Azevedo NP and as soon as she responds I would inform her. Pt verbalized understanding.

## 2019-03-25 NOTE — TELEPHONE ENCOUNTER
"Reviewed patient chart. Patient was seen in the ER and was admitted to the hospital. Reviewed Dr. Ortiz's discharge summary: "HOSPITAL COURSE:  The patient did have a HIDA scan, which was normal.  The patient did have the ultrasound, which revealed polyp of the gallbladder.  The patient had persistent right upper abdominal pain.  The patient was kept n.p.o., given some IV fluid.  The patient was hydrated.  The patient was continued on atenolol 25 mg every day, atorvastatin 20 mg every day.  The patient was given Lovenox 40 mg every day, levothyroxine 25 mcg every day, losartan 25 mg every day.  The patient is on Protonix 40 mg every day.  At this time, the patient was watched very closely.  The patient was monitored by CBC, BMP and liver profile.  The patient was continued on rest of her medication.  The patient's CBC revealed hematocrit of 40.3, hemoglobin of 12.6 with normal total and differential count.  The patient's sodium 138, potassium 4.0, chloride is 101, CO2 is 29 and the patient's BUN was 30 and creatinine was 0.66 with a GFR of 60 and the patient's liver function was within normal limit.  The patient's serum amylase and lipase was unremarkable.  The patient during the hospital course was monitored very closely.  The patient's general condition, still continued to have pain in the right upper abdomen.  The patient was advised a low-fat gallbladder diet.  Reconciliation of medications was done with the final diagnoses of chronic cholecystitis, cholecystodyskinesia, polyp of the gallbladder, GI disorder, reflux esophagitis with coronary artery disease, SP coronary stent placement, diverticulitis, had a colectomy.  The patient at this time will be discharged on low-fat gallbladder diet.  The patient will be seen in my office again in one week for followup.  The patient will require further   as outpatient gastroscopy to rule out any peptic ulcer disease and that the patient does not have any GI problem.  " "The patient does have the chronic cholecystitis, cholecystodyskinesia, polyp of the gallbladder.  Discussed at length with the patient and about 30 minutes were spent and discharge   instructions were given."    At our last visit on 2/28/19, I recommended patient have an EGD, which she has not scheduled yet. I also recommended she see a general surgeon based on the findings of gallbladder polyp on the ultrasound.  I called the patient to clarify and she reports Dr. Ortiz did an EGD and that he wants to proceed with surgery to remove her gallbladder since he believes it is contributing to her symptoms. Patient reports he needs something from her internal medicine doctor but patient reports she is not sure what it is. I informed the patient to contact Dr. Ortiz to see what he is requesting. We can send over records if that is what he is requesting. We do not have access to the EGD that he did recently as reported by the patient. If patient needs an EGD as noted in Dr. Ortiz's discharge summary, we can schedule patient for an EGD with Dr. Luna. If Dr. Ortiz needs surgical clearance, I recommended patient follow-up with her internal medicine doctor or primary care provider. Patient verbalized understanding and agreed with management plan.  KTP  "

## 2019-03-25 NOTE — TELEPHONE ENCOUNTER
----- Message from Letitia Stewart sent at 3/25/2019  9:05 AM CDT -----  Contact: Patient  Type: Needs Medical Advice    Who Called:  Patient  Best Call Back Number:   Additional Information: Calling to speak with the Nurse about her gall bladder. Please advise.

## 2019-03-25 NOTE — TELEPHONE ENCOUNTER
----- Message from Marcelo Hillman sent at 3/25/2019  1:56 PM CDT -----  Type: Needs Medical Advice    Who Called:  Patient  Best Call Back Number: [phone] (home)   Additional Information: Patient would like to speak to office. Please call to advise.

## 2019-03-26 ENCOUNTER — TELEPHONE (OUTPATIENT)
Dept: GASTROENTEROLOGY | Facility: CLINIC | Age: 69
End: 2019-03-26

## 2019-03-26 RX ORDER — SUCRALFATE 1 G/1
TABLET ORAL
Qty: 360 TABLET | Refills: 0 | OUTPATIENT
Start: 2019-03-26

## 2019-03-26 RX ORDER — SUCRALFATE 1 G/1
1 TABLET ORAL
Qty: 120 TABLET | Refills: 0 | Status: SHIPPED | OUTPATIENT
Start: 2019-03-26 | End: 2019-06-28 | Stop reason: CLARIF

## 2019-03-26 NOTE — TELEPHONE ENCOUNTER
Spoke with pt and pt would like to try the carafate. Please send medication to pharmacy on file. Thanks

## 2019-03-26 NOTE — TELEPHONE ENCOUNTER
----- Message from Saliam Wilkes sent at 3/25/2019  4:57 PM CDT -----  Contact: pt  ..Type: Needs Medical Advice    Who Called: pt  Best Call Back Number:[phone]  Additional Information: Pt would like to speak with a nurse regarding a prescription for Carafate  Please call to advise  Thanks

## 2019-05-08 ENCOUNTER — PATIENT MESSAGE (OUTPATIENT)
Dept: OTOLARYNGOLOGY | Facility: CLINIC | Age: 69
End: 2019-05-08

## 2019-05-08 ENCOUNTER — CLINICAL SUPPORT (OUTPATIENT)
Dept: AUDIOLOGY | Facility: CLINIC | Age: 69
End: 2019-05-08
Payer: MEDICARE

## 2019-05-08 ENCOUNTER — OFFICE VISIT (OUTPATIENT)
Dept: OTOLARYNGOLOGY | Facility: CLINIC | Age: 69
End: 2019-05-08
Payer: MEDICARE

## 2019-05-08 VITALS — WEIGHT: 159.38 LBS | HEIGHT: 59 IN | BODY MASS INDEX: 32.13 KG/M2

## 2019-05-08 DIAGNOSIS — H57.11 PAIN IN OR AROUND EYE, RIGHT: Primary | ICD-10-CM

## 2019-05-08 DIAGNOSIS — R07.0 THROAT DISCOMFORT: ICD-10-CM

## 2019-05-08 DIAGNOSIS — H92.09 OTALGIA, UNSPECIFIED LATERALITY: Primary | ICD-10-CM

## 2019-05-08 DIAGNOSIS — H92.01 REFERRED OTALGIA OF RIGHT EAR: ICD-10-CM

## 2019-05-08 DIAGNOSIS — H69.90 DYSFUNCTION OF EUSTACHIAN TUBE, UNSPECIFIED LATERALITY: Primary | ICD-10-CM

## 2019-05-08 DIAGNOSIS — J34.89 SINUS PRESSURE: ICD-10-CM

## 2019-05-08 PROCEDURE — 99999 PR PBB SHADOW E&M-EST. PATIENT-LVL V: ICD-10-PCS | Mod: PBBFAC,,, | Performed by: NURSE PRACTITIONER

## 2019-05-08 PROCEDURE — 92567 PR TYMPA2METRY: ICD-10-PCS | Mod: S$GLB,,, | Performed by: AUDIOLOGIST

## 2019-05-08 PROCEDURE — 99214 OFFICE O/P EST MOD 30 MIN: CPT | Mod: 25,S$GLB,, | Performed by: NURSE PRACTITIONER

## 2019-05-08 PROCEDURE — 31575 PR LARYNGOSCOPY, FLEXIBLE; DIAGNOSTIC: ICD-10-PCS | Mod: S$GLB,,, | Performed by: NURSE PRACTITIONER

## 2019-05-08 PROCEDURE — 1101F PT FALLS ASSESS-DOCD LE1/YR: CPT | Mod: CPTII,S$GLB,, | Performed by: NURSE PRACTITIONER

## 2019-05-08 PROCEDURE — 99999 PR PBB SHADOW E&M-EST. PATIENT-LVL V: CPT | Mod: PBBFAC,,, | Performed by: NURSE PRACTITIONER

## 2019-05-08 PROCEDURE — 92567 TYMPANOMETRY: CPT | Mod: S$GLB,,, | Performed by: AUDIOLOGIST

## 2019-05-08 PROCEDURE — 99214 PR OFFICE/OUTPT VISIT, EST, LEVL IV, 30-39 MIN: ICD-10-PCS | Mod: 25,S$GLB,, | Performed by: NURSE PRACTITIONER

## 2019-05-08 PROCEDURE — 1101F PR PT FALLS ASSESS DOC 0-1 FALLS W/OUT INJ PAST YR: ICD-10-PCS | Mod: CPTII,S$GLB,, | Performed by: NURSE PRACTITIONER

## 2019-05-08 PROCEDURE — 31575 DIAGNOSTIC LARYNGOSCOPY: CPT | Mod: S$GLB,,, | Performed by: NURSE PRACTITIONER

## 2019-05-08 RX ORDER — MELOXICAM 7.5 MG/1
TABLET ORAL
Refills: 0 | COMMUNITY
Start: 2019-04-25 | End: 2019-06-28 | Stop reason: CLARIF

## 2019-05-08 NOTE — PROGRESS NOTES
Tympanometry completed per order from Meredith Gonsalves NP.    Type A tympanogram obtained AU.    Patient referred back to Meredith Gonsalves NP for follow-up evaluation.

## 2019-05-08 NOTE — PROGRESS NOTES
"Subjective:       Patient ID: Herminia Wooten is a 68 y.o. female.    Chief Complaint: No chief complaint on file.    HPI   Patient was seen by Dr. Jorgensen 9 months ago for ARS. Encouraged to continue Flonase, back off oral AH regularly to see if needed, regular nasal saline rinses, consider IT / Allergy again if persistent issues.  Her CT sinuses 10 months ago: "The paranasal sinuses are well aerated and clear.  No significant mucosal thickening or fluid accumulation are identified.  The major sinus ostia are widely patent.  There is mild mucosal thickening of the nasal turbinates consistent with rhinitis.  There are small bilateral middle nasal turbinate russ bullosa.  There is mild basal septum deviation to the right.  Mastoid air cells are well aerated and clear."  Patient saw JOSEPH Azevedo in GI 2 months ago for nausea and GERD. H/o esophagitis and gastritis. Pt states she had EGD done by Dr. Ortiz last month just before undergoing cholecystecomy on 4/6/19. She has omeprazole, ranitidine, and sucralfate on her med card. Patient states that ever since her cholecystectomy on 4/6/19, she has had throat pain on the right side, pain behind right eye, pain/pressure right ear.     Review of Systems   Constitutional: Negative.    HENT: Positive for sore throat and trouble swallowing.         "odd-tasting mucus in throat"  Right side of throat irritation, tickle, causes cough   Eyes: Negative.    Respiratory: Negative.    Cardiovascular: Negative.    Gastrointestinal: Negative.    Musculoskeletal: Negative.    Skin: Negative.    Neurological: Positive for headaches (around right eye, has not seen her neurologist Dr. Darrick Mackey in many years).   Hematological: Negative.    Psychiatric/Behavioral: Negative.        Objective:      Physical Exam   Constitutional: She is oriented to person, place, and time. Vital signs are normal. She appears well-developed and well-nourished. She is cooperative. She does not appear ill. " "No distress.   HENT:   Head: Normocephalic and atraumatic.   Right Ear: Hearing, tympanic membrane, external ear and ear canal normal. Tympanic membrane is not erythematous. Tympanic membrane mobility is normal. No middle ear effusion.   Left Ear: Hearing, tympanic membrane, external ear and ear canal normal. Tympanic membrane is not erythematous. Tympanic membrane mobility is normal.  No middle ear effusion.   Nose: Nose normal. No mucosal edema or rhinorrhea. Right sinus exhibits no maxillary sinus tenderness and no frontal sinus tenderness. Left sinus exhibits no maxillary sinus tenderness and no frontal sinus tenderness.   Mouth/Throat: Uvula is midline, oropharynx is clear and moist and mucous membranes are normal. Mucous membranes are not pale, not dry and not cyanotic. No oral lesions. No oropharyngeal exudate, posterior oropharyngeal edema or posterior oropharyngeal erythema.       Type "A" tympanogram consistent with well-pneumatized mesotympanum and absence of middle ear effusion AU   Eyes: Pupils are equal, round, and reactive to light. Conjunctivae, EOM and lids are normal. Right eye exhibits no discharge. Left eye exhibits no discharge. No scleral icterus.   Neck: Trachea normal and normal range of motion. Neck supple. No tracheal deviation present. No thyroid mass and no thyromegaly present.   Cardiovascular: Normal rate.   Pulmonary/Chest: Effort normal. No stridor. No respiratory distress. She has no wheezes.   Musculoskeletal: Normal range of motion.   Lymphadenopathy:        Head (right side): No submental, no submandibular, no tonsillar, no preauricular and no posterior auricular adenopathy present.        Head (left side): No submental, no submandibular, no tonsillar, no preauricular and no posterior auricular adenopathy present.     She has no cervical adenopathy.        Right cervical: No superficial cervical and no posterior cervical adenopathy present.       Left cervical: No superficial " cervical and no posterior cervical adenopathy present.   Neurological: She is alert and oriented to person, place, and time. She has normal strength. Coordination and gait normal.   Skin: Skin is warm, dry and intact. No lesion and no rash noted. She is not diaphoretic. No cyanosis. No pallor.   Psychiatric: She has a normal mood and affect. Her speech is normal and behavior is normal. Judgment and thought content normal. Cognition and memory are normal.   Nursing note and vitals reviewed.      Procedure: Flexible laryngoscopy    In order to fully examine the upper aerodigestive tract, including the larynx, in a patient with a hyperactive gag reflex, and suboptimal visualization with indirect mirror exam,  flexible endoscopy is required.   After explaining the procedure and obtaining verbal consent, a timeout was performed with the patient's participation according to the universal protocol. Both nasal cavities were anesthetized with 4% Xylocaine spray mixed with Bridger-Synephrine. The flexible laryngoscope  was inserted into the nasal cavity and advanced to visualize the nasal cavity, nasopharynx, the posterior oropharynx, hypopharynx, and the endolarynx with the  findings noted. The scope was removed and the procedure terminated. The patient tolerated this procedure well without apparent complication.     OVERALL FINDINGS  Nasopharynx - the torus is clear. There are no lesions of the posterior wall.   Oropharynx - no lesions of the tongue base. There is no obvious fullness or asymmetry.  Hypopharynx - there are no lesions of the pyriform sinuses or postcricoid region   Larynx - there are no lesions of the supraglottic or glottic larynx.  Vocal fold mobility is normal.     SPECIFIC FINDINGS  Adenoid tissue - normal   Nasopharynx & eustachian tube orifices - normal   Posterior pharyngeal wall - normal   Base of tongue - normal   Epiglottis - normal   Valleculae - normal   Pyriform sinuses - normal   False vocal cords -  "normal   True vocal cords - normal  Arytenoids - normal   Interarytenoid space - edema, erythema   Right BOT    Left BOT    Larynx    Larynx  Assessment:     Type "A" tympanograms consistent with well-pneumatized mesotympanums and absence of middle ear effusions AU    Right otalgia, not otogenic, most likely musculoskeletal etiology  Right headaches behind eye -- r/o sinusitis, imaging needed  Right-sided throat pain, negative ENT exam  Plan:     Reassured right middle ear clear     CT sinus -- will notify pt of results as soon as available  The results of today's ENT exam and flexible endoscopy were detailed to the patient and her questions were answered.   If right-sided throat/ear pain persists, offer CT STN. Pt encouraged to call.  "

## 2019-05-08 NOTE — PATIENT INSTRUCTIONS
Some of the Top Considerations for Recurrent Sore Throat:     1. Nasal allergies -- Typical constellation of symptoms seen with nasal allergies: itchy, red, watery eyes; itchy, red, watery nose; excessive sneezing; excessive stuffiness. Discuss with your primary care provider whether you should see an allergist or take daily allergy medications.     2. Silent reflux -- Typical constellation of symptoms seen with silent reflux: post-nasal drip sensation with absence of significant runny nose or nasal congestion, sensation of thick or too much mucus in the back of throat, raspy voice, frequent throat clearing, lump in the back of throat, frequent sore throats. Return to [facility] and schedule your Upper GI she recommended and continue taking your daily reflux medications (omeprazole, ranitidine, sucralfate).     3. Sinus Infection -- Typical constellation of symptoms seen with acute bacterial sinus infection are:  Green-gold, foul-smelling, foul-tasting mucus from nose and throat, inability to breathe through nose, inability to smell or taste well, facial pain and swelling, dental pain, headaches around eyes, sore throat and productive cough. Sinus imaging may be needed to rule out infection if these symptoms are present.    4. Pharyngitis/Tonsillitis -- Typical constellation of symptoms seen with acute bacterial tonsillitis/pharyngitis are:  Smelly pus (exudate), very red inflamed throat, swollen lymph nodes, fever, general malaise, absence of cough. If these symptoms are present, you may need a throat swab.

## 2019-05-14 ENCOUNTER — HOSPITAL ENCOUNTER (OUTPATIENT)
Dept: RADIOLOGY | Facility: HOSPITAL | Age: 69
Discharge: HOME | End: 2019-05-14
Attending: NURSE PRACTITIONER
Payer: MEDICARE

## 2019-05-14 DIAGNOSIS — H57.11 PAIN IN OR AROUND EYE, RIGHT: ICD-10-CM

## 2019-05-14 DIAGNOSIS — J34.89 SINUS PRESSURE: ICD-10-CM

## 2019-05-14 PROCEDURE — 70486 CT SINUSES WITHOUT CONTRAST: ICD-10-PCS | Mod: 26,,, | Performed by: RADIOLOGY

## 2019-05-14 PROCEDURE — 70486 CT MAXILLOFACIAL W/O DYE: CPT | Mod: 26,,, | Performed by: RADIOLOGY

## 2019-05-14 PROCEDURE — 70486 CT MAXILLOFACIAL W/O DYE: CPT | Mod: TC,PO

## 2019-05-14 NOTE — TELEPHONE ENCOUNTER
----- Message from Meredith Gonsalves NP sent at 5/14/2019 12:46 PM CDT -----  All of your sinuses are open and clear. There is no fluid, thickening or mass. There is no fluid behind your eardrums.

## 2019-07-03 PROBLEM — M46.1 INFLAMMATION OF SACROILIAC JOINT: Status: ACTIVE | Noted: 2019-07-03

## 2019-08-23 ENCOUNTER — HOSPITAL ENCOUNTER (OUTPATIENT)
Dept: RADIOLOGY | Facility: HOSPITAL | Age: 69
Discharge: HOME | End: 2019-08-23
Attending: OBSTETRICS & GYNECOLOGY
Payer: MEDICARE

## 2019-08-23 ENCOUNTER — OFFICE VISIT (OUTPATIENT)
Dept: OBSTETRICS AND GYNECOLOGY | Facility: CLINIC | Age: 69
End: 2019-08-23
Payer: MEDICARE

## 2019-08-23 VITALS — BODY MASS INDEX: 32.66 KG/M2 | HEIGHT: 59 IN | WEIGHT: 162 LBS

## 2019-08-23 VITALS
DIASTOLIC BLOOD PRESSURE: 48 MMHG | SYSTOLIC BLOOD PRESSURE: 152 MMHG | HEIGHT: 59 IN | BODY MASS INDEX: 32.8 KG/M2 | WEIGHT: 162.69 LBS

## 2019-08-23 DIAGNOSIS — N93.0 POSTCOITAL BLEEDING: ICD-10-CM

## 2019-08-23 DIAGNOSIS — L90.0 LICHEN SCLEROSUS ET ATROPHICUS: ICD-10-CM

## 2019-08-23 DIAGNOSIS — Z12.31 VISIT FOR SCREENING MAMMOGRAM: ICD-10-CM

## 2019-08-23 DIAGNOSIS — N95.2 ATROPHIC VAGINITIS: ICD-10-CM

## 2019-08-23 DIAGNOSIS — L23.9 ALLERGIC CONTACT DERMATITIS, UNSPECIFIED TRIGGER: ICD-10-CM

## 2019-08-23 DIAGNOSIS — Z79.890 POSTMENOPAUSAL HRT (HORMONE REPLACEMENT THERAPY): Primary | ICD-10-CM

## 2019-08-23 PROCEDURE — 77063 BREAST TOMOSYNTHESIS BI: CPT | Mod: 26,,, | Performed by: RADIOLOGY

## 2019-08-23 PROCEDURE — 3078F DIAST BP <80 MM HG: CPT | Mod: CPTII,S$GLB,, | Performed by: OBSTETRICS & GYNECOLOGY

## 2019-08-23 PROCEDURE — 77063 MAMMO DIGITAL SCREENING BILAT WITH TOMOSYNTHESIS_CAD: ICD-10-PCS | Mod: 26,,, | Performed by: RADIOLOGY

## 2019-08-23 PROCEDURE — 3078F PR MOST RECENT DIASTOLIC BLOOD PRESSURE < 80 MM HG: ICD-10-PCS | Mod: CPTII,S$GLB,, | Performed by: OBSTETRICS & GYNECOLOGY

## 2019-08-23 PROCEDURE — 1101F PR PT FALLS ASSESS DOC 0-1 FALLS W/OUT INJ PAST YR: ICD-10-PCS | Mod: CPTII,S$GLB,, | Performed by: OBSTETRICS & GYNECOLOGY

## 2019-08-23 PROCEDURE — 3077F PR MOST RECENT SYSTOLIC BLOOD PRESSURE >= 140 MM HG: ICD-10-PCS | Mod: CPTII,S$GLB,, | Performed by: OBSTETRICS & GYNECOLOGY

## 2019-08-23 PROCEDURE — 3077F SYST BP >= 140 MM HG: CPT | Mod: CPTII,S$GLB,, | Performed by: OBSTETRICS & GYNECOLOGY

## 2019-08-23 PROCEDURE — 77067 MAMMO DIGITAL SCREENING BILAT WITH TOMOSYNTHESIS_CAD: ICD-10-PCS | Mod: 26,,, | Performed by: RADIOLOGY

## 2019-08-23 PROCEDURE — 77067 SCR MAMMO BI INCL CAD: CPT | Mod: TC,PN

## 2019-08-23 PROCEDURE — 99213 OFFICE O/P EST LOW 20 MIN: CPT | Mod: S$GLB,,, | Performed by: OBSTETRICS & GYNECOLOGY

## 2019-08-23 PROCEDURE — 77067 SCR MAMMO BI INCL CAD: CPT | Mod: 26,,, | Performed by: RADIOLOGY

## 2019-08-23 PROCEDURE — 99999 PR PBB SHADOW E&M-EST. PATIENT-LVL IV: ICD-10-PCS | Mod: PBBFAC,,, | Performed by: OBSTETRICS & GYNECOLOGY

## 2019-08-23 PROCEDURE — 99213 PR OFFICE/OUTPT VISIT, EST, LEVL III, 20-29 MIN: ICD-10-PCS | Mod: S$GLB,,, | Performed by: OBSTETRICS & GYNECOLOGY

## 2019-08-23 PROCEDURE — 99999 PR PBB SHADOW E&M-EST. PATIENT-LVL IV: CPT | Mod: PBBFAC,,, | Performed by: OBSTETRICS & GYNECOLOGY

## 2019-08-23 PROCEDURE — 1101F PT FALLS ASSESS-DOCD LE1/YR: CPT | Mod: CPTII,S$GLB,, | Performed by: OBSTETRICS & GYNECOLOGY

## 2019-08-23 RX ORDER — CLOBETASOL PROPIONATE 0.5 MG/G
CREAM TOPICAL
Qty: 30 G | Refills: 1 | Status: SHIPPED | OUTPATIENT
Start: 2019-08-23 | End: 2022-07-26 | Stop reason: SDUPTHER

## 2019-08-23 RX ORDER — ESTRADIOL 0.1 MG/G
CREAM VAGINAL
Qty: 42.5 G | Refills: 1 | Status: SHIPPED | OUTPATIENT
Start: 2019-08-23 | End: 2019-09-30 | Stop reason: SDUPTHER

## 2019-08-23 NOTE — PROGRESS NOTES
Chief Complaint   Patient presents with    hormone       History of Present Illness: Herminia Wooten is a 68 y.o. female that presents today 8/23/2019 for   Chief Complaint   Patient presents with    hormone         Past Medical History:   Diagnosis Date    Anesthesia complication     elevated BP    Coronary artery disease     Diverticulitis     Diverticulosis     Encounter for blood transfusion     Fatty liver 4/25/2016    GERD (gastroesophageal reflux disease)     Hyperlipidemia     Hypertension     Hypothyroid     Irritable bowel syndrome     Kidney stone     Liver hemangioma     Seasonal allergies     Thyroid disease        Past Surgical History:   Procedure Laterality Date    ABDOMINAL SURGERY      APPENDECTOMY      BLOCK, SACROILIAC JOINT Right 7/3/2019    [facility]    CARDIAC CATHETERIZATION      2 stents    CHOLECYSTECTOMY      COLECTOMY      endometriosis- 8.5 inches removed    COLONOSCOPY  2016    repeat in 5    COLONOSCOPY N/A 6/27/2016    Performed by Avel Luna Jr., MD at [facility]    CORONARY ANGIOPLASTY WITH STENT PLACEMENT      x 2    ESOPHAGOGASTRODUODENOSCOPY (EGD) N/A 6/27/2016    Performed by Avel Luna Jr., MD at [facility]    HYSTERECTOMY      INCONTINENCE SURGERY      JOINT REPLACEMENT Right     Knee    OOPHORECTOMY      rectocele  05/2017    repair    Stent OM      UPPER GASTROINTESTINAL ENDOSCOPY  6/2013 Dr. Peters    reflux esophagitis; biopsy: negative dysplasia, intestinal metaplasia; mild chronic inflammation- GERD related & regenerative glandular epithelial change; strips of squamous esophageal mucosa with moderate basal epithelial cell hyperplasia and rare intraepithelial eosinophils (< 1 per 10 high power fields)    UPPER GASTROINTESTINAL ENDOSCOPY  06/2016    Dr. luna    URETHRA SURGERY         Current Outpatient Medications   Medication Sig Dispense Refill    aluminum-magnesium  hydroxide-simethicone (MAALOX) 200-200-20 mg/5 mL Susp Take 30 mLs by mouth 4 (four) times daily before meals and nightly.      atenolol (TENORMIN) 25 MG tablet Take 25 mg by mouth every evening.       atorvastatin (LIPITOR) 20 MG tablet Take 20 mg by mouth once daily.      betamethasone valerate 0.1% (VALISONE) 0.1 % Crea Apply 1 application topically daily as needed.       bisacodyl 5 mg Tab Take 5 mg by mouth once daily.       cholecalciferol, vitamin D3, (VITAMIN D3) 2,000 unit Tab Take 1 tablet by mouth once daily.       clobetasol (TEMOVATE) 0.05 % cream APPLY 1/2 GRAM TOPICALLY twice weekly 30 g 1    estradiol (ESTRACE) 0.01 % (0.1 mg/gram) vaginal cream PLACE 1 GRAM VAGINALLY twice weekly 42.5 g 1    fluticasone (FLONASE) 50 mcg/actuation nasal spray 1 spray by Each Nare route 2 (two) times daily as needed.       levothyroxine (SYNTHROID) 25 MCG tablet Take 25 mcg by mouth once daily.       lorazepam (ATIVAN) 0.5 MG tablet Take 0.5 mg by mouth every 12 (twelve) hours as needed.       losartan (COZAAR) 50 MG tablet Take 25 mg by mouth once daily.       mometasone 220 mcg (30 doses) inhaler Inhale 2 puffs into the lungs daily as needed.       ondansetron (ZOFRAN-ODT) 4 MG TbDL DIS ONE T PO Q 4 TO 6 H PRN N  0    ranitidine (ZANTAC) 150 MG tablet Take 2 tablets (300 mg total) by mouth nightly. (Patient taking differently: Take 150 mg by mouth nightly as needed. )      aspirin (ECOTRIN) 81 MG EC tablet Take 81 mg by mouth once daily.       HYDROcodone-acetaminophen (NORCO)  mg per tablet Take 1 tablet by mouth every 8 (eight) hours as needed for Pain.       omeprazole (PRILOSEC) 40 MG capsule Take 1 capsule (40 mg total) by mouth once daily. 30 capsule 5    polyethylene glycol (GLYCOLAX) 17 gram PwPk Take 17 g by mouth daily as needed.        No current facility-administered medications for this visit.      Facility-Administered Medications Ordered in Other Visits   Medication Dose Route  "Frequency Provider Last Rate Last Dose    lactated ringers infusion   Intravenous Continuous Frandy Polanco MD   Stopped at 19 0815       Review of patient's allergies indicates:   Allergen Reactions    Codeine Other (See Comments)     nervousness    Caffeine Anxiety     In medications    Percodan [oxycodone hcl-oxycodone-asa] Anxiety       Family History   Problem Relation Age of Onset    Clotting disorder Maternal Grandmother     Diverticulitis Maternal Grandfather     Anesthesia problems Neg Hx     Breast cancer Neg Hx     Ovarian cancer Neg Hx     Colon cancer Neg Hx     Colon polyps Neg Hx     Crohn's disease Neg Hx     Ulcerative colitis Neg Hx        Social History     Tobacco Use    Smoking status: Former Smoker     Packs/day: 0.25     Years: 35.00     Pack years: 8.75     Last attempt to quit: 10/7/1999     Years since quittin.8    Smokeless tobacco: Never Used   Substance Use Topics    Alcohol use: No    Drug use: No       OB History    Para Term  AB Living   1 1 1         SAB TAB Ectopic Multiple Live Births                  # Outcome Date GA Lbr Elfego/2nd Weight Sex Delivery Anes PTL Lv   1 Term                Review of Symptoms:  GENERAL: Denies weight gain or weight loss. Feeling well overall.   SKIN: Denies rash or lesions.   HEAD: Denies head injury or headache.   NODES: Denies enlarged lymph nodes.   CHEST: Denies chest pain or shortness of breath.   CARDIOVASCULAR: Denies palpitations or left sided chest pain.   ABDOMEN: No abdominal pain, constipation, diarrhea, nausea, vomiting or rectal bleeding.   URINARY: No frequency, dysuria, hematuria, or burning on urination.  HEMATOLOGIC: No easy bruisability or excessive bleeding.   MUSCULOSKELETAL: Denies joint pain or swelling.     BP (!) 152/48   Ht 4' 11" (1.499 m)   Wt 73.8 kg (162 lb 11.2 oz)   LMP 10/07/1976   Physical Exam:  APPEARANCE: Well nourished, well developed, in no acute distress.  SKIN: " Normal skin turgor, no lesions.  NECK: Neck symmetric without masses   RESPIRATORY: Normal respiratory effort with no retractions or use of accessory muscles  CARDIOVASCULAR: Peripheral vascular system with no swelling no varicosities and palpation of pulses normal  LYMPHATIC: No enlargements of the lymph nodes noted in the neck, axillae, or groin  ABDOMEN: Soft. No tenderness or masses. No hepatosplenomegaly. No hernias.  BREASTS: Symmetrical, no skin changes or visible lesions. No palpable masses, nipple discharge or adenopathy bilaterally.EXTREMITIES: No clubbing cyanosis or edema.    ASSESSMENT/PLAN:  Postmenopausal HRT (hormone replacement therapy)  -     estradiol (ESTRACE) 0.01 % (0.1 mg/gram) vaginal cream; PLACE 1 GRAM VAGINALLY twice weekly  Dispense: 42.5 g; Refill: 1    Visit for screening mammogram  -     Cancel: Mammo Digital Screening Bilat With CAD; Future; Expected date: 08/23/2019    Lichen sclerosus et atrophicus  -     clobetasol (TEMOVATE) 0.05 % cream; APPLY 1/2 GRAM TOPICALLY twice weekly  Dispense: 30 g; Refill: 1    Atrophic vaginitis  -     estradiol (ESTRACE) 0.01 % (0.1 mg/gram) vaginal cream; PLACE 1 GRAM VAGINALLY twice weekly  Dispense: 42.5 g; Refill: 1    Postcoital bleeding  -     estradiol (ESTRACE) 0.01 % (0.1 mg/gram) vaginal cream; PLACE 1 GRAM VAGINALLY twice weekly  Dispense: 42.5 g; Refill: 1    Allergic contact dermatitis, unspecified trigger  -     clobetasol (TEMOVATE) 0.05 % cream; APPLY 1/2 GRAM TOPICALLY twice weekly  Dispense: 30 g; Refill: 1        15 minutes spent today with this patient. Greater than half spent in counseling today.

## 2019-08-28 PROBLEM — M47.816 SPONDYLOSIS WITHOUT MYELOPATHY OR RADICULOPATHY, LUMBAR REGION: Status: ACTIVE | Noted: 2019-08-28

## 2019-08-28 PROBLEM — M54.50 LUMBAGO: Status: ACTIVE | Noted: 2019-08-28

## 2019-09-04 ENCOUNTER — TELEPHONE (OUTPATIENT)
Dept: OBSTETRICS AND GYNECOLOGY | Facility: CLINIC | Age: 69
End: 2019-09-04

## 2019-09-04 DIAGNOSIS — B37.9 CANDIDIASIS: Primary | ICD-10-CM

## 2019-09-04 RX ORDER — FLUCONAZOLE 150 MG/1
150 TABLET ORAL ONCE
Qty: 1 TABLET | Refills: 1 | Status: SHIPPED | OUTPATIENT
Start: 2019-09-04 | End: 2019-09-04

## 2019-09-04 NOTE — TELEPHONE ENCOUNTER
----- Message from Arnoldo Bolden sent at 9/4/2019  2:12 PM CDT -----  Type:  RX Refill Request    Who Called:  Self Refill or New Rx:  New rx   RX Name and Strength:  rx diflucan   How is the patient currently taking it? (ex. 1XDay):   Is this a 30 day or 90 day RX:   Preferred Pharmacy with phone number:  [facility] on [phone]  Local or Mail Order:  Local   Ordering Provider:  Dr Homer Roberts Call Back Number:  [phone]  Additional Information:  Patient requesting a new rx for yeast infection.

## 2019-09-26 ENCOUNTER — CLINICAL SUPPORT (OUTPATIENT)
Dept: OBSTETRICS AND GYNECOLOGY | Facility: CLINIC | Age: 69
End: 2019-09-26
Payer: MEDICARE

## 2019-09-26 DIAGNOSIS — R39.89 POSSIBLE URINARY TRACT INFECTION: Primary | ICD-10-CM

## 2019-09-26 PROCEDURE — 87086 URINE CULTURE/COLONY COUNT: CPT

## 2019-09-28 LAB — BACTERIA UR CULT: NO GROWTH

## 2019-09-30 DIAGNOSIS — N95.2 ATROPHIC VAGINITIS: ICD-10-CM

## 2019-09-30 DIAGNOSIS — N93.0 POSTCOITAL BLEEDING: ICD-10-CM

## 2019-09-30 DIAGNOSIS — Z79.890 POSTMENOPAUSAL HRT (HORMONE REPLACEMENT THERAPY): ICD-10-CM

## 2019-10-01 RX ORDER — ESTRADIOL 0.1 MG/G
CREAM VAGINAL
Qty: 42.5 G | Refills: 1 | Status: SHIPPED | OUTPATIENT
Start: 2019-10-01 | End: 2020-06-09

## 2019-11-18 ENCOUNTER — OFFICE VISIT (OUTPATIENT)
Dept: OBSTETRICS AND GYNECOLOGY | Facility: CLINIC | Age: 69
End: 2019-11-18
Payer: MEDICARE

## 2019-11-18 VITALS
SYSTOLIC BLOOD PRESSURE: 144 MMHG | BODY MASS INDEX: 35.2 KG/M2 | HEIGHT: 59 IN | DIASTOLIC BLOOD PRESSURE: 60 MMHG | WEIGHT: 174.63 LBS

## 2019-11-18 DIAGNOSIS — N95.2 ATROPHIC VAGINITIS: Primary | ICD-10-CM

## 2019-11-18 PROCEDURE — 99213 OFFICE O/P EST LOW 20 MIN: CPT | Mod: S$GLB,,, | Performed by: SPECIALIST

## 2019-11-18 PROCEDURE — 3078F DIAST BP <80 MM HG: CPT | Mod: CPTII,S$GLB,, | Performed by: SPECIALIST

## 2019-11-18 PROCEDURE — 99999 PR PBB SHADOW E&M-EST. PATIENT-LVL IV: ICD-10-PCS | Mod: PBBFAC,,, | Performed by: SPECIALIST

## 2019-11-18 PROCEDURE — 99999 PR PBB SHADOW E&M-EST. PATIENT-LVL IV: CPT | Mod: PBBFAC,,, | Performed by: SPECIALIST

## 2019-11-18 PROCEDURE — 1101F PR PT FALLS ASSESS DOC 0-1 FALLS W/OUT INJ PAST YR: ICD-10-PCS | Mod: CPTII,S$GLB,, | Performed by: SPECIALIST

## 2019-11-18 PROCEDURE — 3077F SYST BP >= 140 MM HG: CPT | Mod: CPTII,S$GLB,, | Performed by: SPECIALIST

## 2019-11-18 PROCEDURE — 1101F PT FALLS ASSESS-DOCD LE1/YR: CPT | Mod: CPTII,S$GLB,, | Performed by: SPECIALIST

## 2019-11-18 PROCEDURE — 99213 PR OFFICE/OUTPT VISIT, EST, LEVL III, 20-29 MIN: ICD-10-PCS | Mod: S$GLB,,, | Performed by: SPECIALIST

## 2019-11-18 PROCEDURE — 3078F PR MOST RECENT DIASTOLIC BLOOD PRESSURE < 80 MM HG: ICD-10-PCS | Mod: CPTII,S$GLB,, | Performed by: SPECIALIST

## 2019-11-18 PROCEDURE — 3077F PR MOST RECENT SYSTOLIC BLOOD PRESSURE >= 140 MM HG: ICD-10-PCS | Mod: CPTII,S$GLB,, | Performed by: SPECIALIST

## 2019-11-18 RX ORDER — ESTRADIOL 0.1 MG/G
1 CREAM VAGINAL
Qty: 42.5 G | Refills: 1 | Status: SHIPPED | OUTPATIENT
Start: 2019-11-18 | End: 2020-07-06

## 2019-11-18 RX ORDER — BETAMETHASONE DIPROPIONATE 0.5 MG/G
GEL TOPICAL
Refills: 1 | COMMUNITY
Start: 2019-09-16

## 2019-11-18 RX ORDER — FLUCONAZOLE 150 MG/1
TABLET ORAL
Refills: 1 | COMMUNITY
Start: 2019-10-30 | End: 2020-11-02

## 2019-11-18 RX ORDER — DEXAMETHASONE 4 MG/1
TABLET ORAL
Refills: 4 | COMMUNITY
Start: 2019-11-11

## 2019-11-18 NOTE — PROGRESS NOTES
70 yo WF presents for ERT management and H/O STV.  Past Medical History:   Diagnosis Date    Anesthesia complication     elevated BP    Coronary artery disease     Diverticulitis     Diverticulosis     Encounter for blood transfusion     Fatty liver 4/25/2016    GERD (gastroesophageal reflux disease)     Hyperlipidemia     Hypertension     Hypothyroid     Irritable bowel syndrome     Kidney stone     Liver hemangioma     Seasonal allergies     Thyroid disease        Past Surgical History:   Procedure Laterality Date    ABDOMINAL SURGERY      APPENDECTOMY      CARDIAC CATHETERIZATION      2 stents    CHOLECYSTECTOMY      COLECTOMY      endometriosis- 8.5 inches removed    COLONOSCOPY  2016    repeat in 5    COLONOSCOPY N/A 6/27/2016    Procedure: COLONOSCOPY;  Surgeon: Avel Luna Jr., MD;  Location: [facility];  Service: Endoscopy;  Laterality: N/A; repeat in 5 years for surveillance    CORONARY ANGIOPLASTY WITH STENT PLACEMENT      x 2    HYSTERECTOMY      INCONTINENCE SURGERY      INJECTION OF ANESTHETIC AGENT INTO SACROILIAC JOINT Right 7/3/2019    Procedure: BLOCK, SACROILIAC JOINT;  Surgeon: Homer Diamond MD;  Location: [facility];  Service: Pain Management;  Laterality: Right;    INJECTION OF FACET JOINT Right 8/28/2019    Procedure: INJECTION, FACET JOINT;  Surgeon: Homer Diamond MD;  Location: [facility];  Service: Pain Management;  Laterality: Right;  L4 and L5    JOINT REPLACEMENT Right     Knee    OOPHORECTOMY      rectocele  05/2017    repair    Stent OM      UPPER GASTROINTESTINAL ENDOSCOPY  6/2013 Dr. Peters    reflux esophagitis; biopsy: negative dysplasia, intestinal metaplasia; mild chronic inflammation- GERD related & regenerative glandular epithelial change; strips of squamous esophageal mucosa with moderate basal epithelial cell hyperplasia and rare intraepithelial eosinophils (< 1 per 10 high power fields)    UPPER GASTROINTESTINAL ENDOSCOPY  06/2016     Dr. de la cruz    URETHRA SURGERY         Family History   Problem Relation Age of Onset    Clotting disorder Maternal Grandmother     Diverticulitis Maternal Grandfather     Anesthesia problems Neg Hx     Breast cancer Neg Hx     Ovarian cancer Neg Hx     Colon cancer Neg Hx     Colon polyps Neg Hx     Crohn's disease Neg Hx     Ulcerative colitis Neg Hx        Social History     Socioeconomic History    Marital status:      Spouse name: Not on file    Number of children: Not on file    Years of education: Not on file    Highest education level: Not on file   Occupational History    Not on file   Social Needs    Financial resource strain: Not on file    Food insecurity:     Worry: Not on file     Inability: Not on file    Transportation needs:     Medical: Not on file     Non-medical: Not on file   Tobacco Use    Smoking status: Former Smoker     Packs/day: 0.25     Years: 35.00     Pack years: 8.75     Last attempt to quit: 10/7/1999     Years since quittin.1    Smokeless tobacco: Never Used   Substance and Sexual Activity    Alcohol use: No    Drug use: No    Sexual activity: Yes     Partners: Male     Birth control/protection: See Surgical Hx   Lifestyle    Physical activity:     Days per week: Not on file     Minutes per session: Not on file    Stress: Not on file   Relationships    Social connections:     Talks on phone: Not on file     Gets together: Not on file     Attends Protestant service: Not on file     Active member of club or organization: Not on file     Attends meetings of clubs or organizations: Not on file     Relationship status: Not on file   Other Topics Concern    Not on file   Social History Narrative    Not on file       Current Outpatient Medications   Medication Sig Dispense Refill    aluminum-magnesium hydroxide-simethicone (MAALOX) 200-200-20 mg/5 mL Susp Take 30 mLs by mouth 4 (four) times daily before meals and nightly.      aspirin (ECOTRIN) 81  MG EC tablet Take 81 mg by mouth once daily.       atenolol (TENORMIN) 25 MG tablet Take 25 mg by mouth every evening.       atorvastatin (LIPITOR) 20 MG tablet Take 20 mg by mouth once daily.      augmented betamethasone (DIPROLENE) 0.05 % gel FCO THIN FILM AA 4 TO 6 TIMES D. DO NOT USE FOR LONGER THAN 14 DAYS  1    betamethasone valerate 0.1% (VALISONE) 0.1 % Crea Apply 1 application topically daily as needed.       cholecalciferol, vitamin D3, (VITAMIN D3) 2,000 unit Tab Take 1 tablet by mouth once daily.       clobetasol (TEMOVATE) 0.05 % cream APPLY 1/2 GRAM TOPICALLY twice weekly 30 g 1    estradiol (ESTRACE) 0.01 % (0.1 mg/gram) vaginal cream PLACE 1 GRAM VAGINALLY twice weekly 42.5 g 1    FLOVENT  mcg/actuation inhaler INHALE 1 PUFF PO Q 12 H  4    fluconazole (DIFLUCAN) 150 MG Tab TK 1 T PO D FOR 2 DAYS  1    fluticasone (FLONASE) 50 mcg/actuation nasal spray 1 spray by Each Nare route 2 (two) times daily as needed.       FLUZONE HIGH-DOSE 2019-20, PF, 180 mcg/0.5 mL Syrg ADM 0.5ML IM UTD  0    levothyroxine (SYNTHROID) 25 MCG tablet Take 25 mcg by mouth once daily.       lorazepam (ATIVAN) 0.5 MG tablet Take 0.5 mg by mouth every 12 (twelve) hours as needed.       losartan (COZAAR) 50 MG tablet Take 50 mg by mouth once daily.       mometasone 220 mcg (30 doses) inhaler Inhale 2 puffs into the lungs daily as needed.       omeprazole (PRILOSEC) 40 MG capsule Take 1 capsule (40 mg total) by mouth once daily. 30 capsule 5    bisacodyl 5 mg Tab Take 5 mg by mouth once daily.       estradiol (ESTRACE) 0.01 % (0.1 mg/gram) vaginal cream Place 1 g vaginally 3 (three) times a week. 42.5 g 1    HYDROcodone-acetaminophen (NORCO)  mg per tablet Take 1 tablet by mouth every 8 (eight) hours as needed for Pain.       ondansetron (ZOFRAN-ODT) 4 MG TbDL DIS ONE T PO Q 4 TO 6 H PRN N  0    polyethylene glycol (GLYCOLAX) 17 gram PwPk Take 17 g by mouth daily as needed.       ranitidine  (ZANTAC) 150 MG tablet Take 2 tablets (300 mg total) by mouth nightly. (Patient not taking: Reported on 11/18/2019)       No current facility-administered medications for this visit.      Facility-Administered Medications Ordered in Other Visits   Medication Dose Route Frequency Provider Last Rate Last Dose    lactated ringers infusion   Intravenous Continuous Frandy Polanco MD   Stopped at 07/03/19 0815       Review of patient's allergies indicates:   Allergen Reactions    Codeine Other (See Comments)     nervousness    Caffeine Anxiety     In medications    Percodan [oxycodone hcl-oxycodone-asa] Anxiety       Review of System:   General: no chills, fever, night sweats, weight gain or weight loss  Psychological: no depression or suicidal ideation  Breasts: no new or changing breast lumps, nipple discharge or masses.  Respiratory: no cough, shortness of breath, or wheezing  Cardiovascular: no chest pain or dyspnea on exertion  Gastrointestinal: no abdominal pain, change in bowel habits, or black or bloody stools  Genito-Urinary: no incontinence, urinary frequency/urgency or , pelvic pain or abnormal vaginal bleeding. VAGINAL AND VULVAR IRRIATTION  Musculoskeletal: no gait disturbance or muscular weakness    PE:   APPEARANCE: Well nourished, well developed, in no acute distress.  NECK: Neck symmetric without masses or thyromegaly.  NODES: No inguinal lymph node enlargement.  ABDOMEN: Soft. No tenderness or masses. No hepatosplenomegaly. No hernias.  BREASTS: DEFERRED  PELVIC: Normal external female genitalia without lesions. Normal hair distribution. Adequate perineal body, normal urethral meatus. Vagina ATROPHIC AND POORLY rugated without lesions or discharge. No significant cystocele or rectocele. Uterus and cervix surgically absent. Bimanual exam revealed no masses, tenderness or abnormality.    I discussed ATV and rec increasing vaginal ERT to three times weekly  Will prescribe and follow response

## 2019-12-04 ENCOUNTER — TELEPHONE (OUTPATIENT)
Dept: OBSTETRICS AND GYNECOLOGY | Facility: CLINIC | Age: 69
End: 2019-12-04

## 2019-12-04 RX ORDER — ESTRADIOL 0.1 MG/G
1 CREAM VAGINAL
Qty: 42.5 G | Refills: 1 | Status: SHIPPED | OUTPATIENT
Start: 2019-12-05 | End: 2020-06-09

## 2019-12-04 NOTE — TELEPHONE ENCOUNTER
Pt states she thinks she has another UTI and she started sulfa meds. But she thinks the estradiol cream generic is making it worse. She thinks she needs the brand name ( estrace) . The brand name isnt covered by her insruance though.     Also she has a red spot in the vaginal area and feels like the estradiol cream agitated it and made it worse.

## 2019-12-04 NOTE — TELEPHONE ENCOUNTER
----- Message from Che Ibrahim sent at 12/4/2019  9:36 AM CST -----  Contact: self [phone]  Patient is requesting a call back from the nurse stated she having trouble with UTI, and have some questions.    Please call the patient upon request at phone number [phone].

## 2019-12-12 ENCOUNTER — TELEPHONE (OUTPATIENT)
Dept: OBSTETRICS AND GYNECOLOGY | Facility: CLINIC | Age: 69
End: 2019-12-12

## 2019-12-12 NOTE — TELEPHONE ENCOUNTER
Pt notified PA message from pharmacy is being faxed over and PA would be started as soon as possible she will be notified with results. Pt voiced understanding

## 2019-12-12 NOTE — TELEPHONE ENCOUNTER
----- Message from Arnoldo Bolden sent at 12/12/2019  1:07 PM CST -----  Type: Needs Medical Advice    Who Called:  Self   Symptoms (please be specific):  NA   How long has patient had these symptoms:  NA  Pharmacy name and phone #:    Best Call Back Number: [phone]  Additional Information: Patient called asking for the status for PA for rx estrace

## 2020-02-05 ENCOUNTER — OFFICE VISIT (OUTPATIENT)
Dept: OBSTETRICS AND GYNECOLOGY | Facility: CLINIC | Age: 70
End: 2020-02-05
Payer: MEDICARE

## 2020-02-05 VITALS
DIASTOLIC BLOOD PRESSURE: 58 MMHG | WEIGHT: 173.5 LBS | SYSTOLIC BLOOD PRESSURE: 138 MMHG | RESPIRATION RATE: 18 BRPM | BODY MASS INDEX: 35.04 KG/M2

## 2020-02-05 DIAGNOSIS — R30.0 DYSURIA: Primary | ICD-10-CM

## 2020-02-05 PROCEDURE — 99999 PR PBB SHADOW E&M-EST. PATIENT-LVL IV: CPT | Mod: PBBFAC,,, | Performed by: OBSTETRICS & GYNECOLOGY

## 2020-02-05 PROCEDURE — 3078F PR MOST RECENT DIASTOLIC BLOOD PRESSURE < 80 MM HG: ICD-10-PCS | Mod: CPTII,S$GLB,, | Performed by: OBSTETRICS & GYNECOLOGY

## 2020-02-05 PROCEDURE — 99214 PR OFFICE/OUTPT VISIT, EST, LEVL IV, 30-39 MIN: ICD-10-PCS | Mod: S$GLB,,, | Performed by: OBSTETRICS & GYNECOLOGY

## 2020-02-05 PROCEDURE — 99214 OFFICE O/P EST MOD 30 MIN: CPT | Mod: S$GLB,,, | Performed by: OBSTETRICS & GYNECOLOGY

## 2020-02-05 PROCEDURE — 3078F DIAST BP <80 MM HG: CPT | Mod: CPTII,S$GLB,, | Performed by: OBSTETRICS & GYNECOLOGY

## 2020-02-05 PROCEDURE — 1126F PR PAIN SEVERITY QUANTIFIED, NO PAIN PRESENT: ICD-10-PCS | Mod: S$GLB,,, | Performed by: OBSTETRICS & GYNECOLOGY

## 2020-02-05 PROCEDURE — 1126F AMNT PAIN NOTED NONE PRSNT: CPT | Mod: S$GLB,,, | Performed by: OBSTETRICS & GYNECOLOGY

## 2020-02-05 PROCEDURE — 1101F PR PT FALLS ASSESS DOC 0-1 FALLS W/OUT INJ PAST YR: ICD-10-PCS | Mod: CPTII,S$GLB,, | Performed by: OBSTETRICS & GYNECOLOGY

## 2020-02-05 PROCEDURE — 3075F SYST BP GE 130 - 139MM HG: CPT | Mod: CPTII,S$GLB,, | Performed by: OBSTETRICS & GYNECOLOGY

## 2020-02-05 PROCEDURE — 3075F PR MOST RECENT SYSTOLIC BLOOD PRESS GE 130-139MM HG: ICD-10-PCS | Mod: CPTII,S$GLB,, | Performed by: OBSTETRICS & GYNECOLOGY

## 2020-02-05 PROCEDURE — 1159F PR MEDICATION LIST DOCUMENTED IN MEDICAL RECORD: ICD-10-PCS | Mod: S$GLB,,, | Performed by: OBSTETRICS & GYNECOLOGY

## 2020-02-05 PROCEDURE — 99999 PR PBB SHADOW E&M-EST. PATIENT-LVL IV: ICD-10-PCS | Mod: PBBFAC,,, | Performed by: OBSTETRICS & GYNECOLOGY

## 2020-02-05 PROCEDURE — 87086 URINE CULTURE/COLONY COUNT: CPT

## 2020-02-05 PROCEDURE — 1159F MED LIST DOCD IN RCRD: CPT | Mod: S$GLB,,, | Performed by: OBSTETRICS & GYNECOLOGY

## 2020-02-05 PROCEDURE — 1101F PT FALLS ASSESS-DOCD LE1/YR: CPT | Mod: CPTII,S$GLB,, | Performed by: OBSTETRICS & GYNECOLOGY

## 2020-02-05 RX ORDER — PHENAZOPYRIDINE HYDROCHLORIDE 200 MG/1
200 TABLET, FILM COATED ORAL 3 TIMES DAILY PRN
Qty: 12 TABLET | Refills: 0 | Status: SHIPPED | OUTPATIENT
Start: 2020-02-05 | End: 2020-02-15

## 2020-02-05 NOTE — PROGRESS NOTES
Chief Complaint   Patient presents with    Vaginal Bleeding       History of Present Illness   69 y.o.  female patient presents today for multiple complaints. uti symptoms on and off for week or so, has taken bactrim she had at home. Also vaginal irritation, spotting on estrace cream , not using the clobetasol regularly. Counseled. Also still having problmes with rectocele.     Past medical and surgical history reviewed.   I have reviewed the patient's medical history in detail and updated the computerized patient record.    Review of patient's allergies indicates:   Allergen Reactions    Codeine Other (See Comments)     nervousness    Caffeine Anxiety     In medications    Percodan [oxycodone hcl-oxycodone-asa] Anxiety         Review of Systems - Negative except HPI  GEN ROS: negative for - chills or fever  Breast ROS: negative for breast lumps  Genito-Urinary ROS: no dysuria, trouble voiding, or hematuria          Physical Examination:  BP (!) 138/58   Resp 18   Wt 78.7 kg (173 lb 8 oz)   LMP 10/07/1976   BMI 35.04 kg/m²    Constitutional: She appears alert and responsive. She appears well-developed, well-groomed, and well-nourished. No distress. OverWeight   HENT:   Head: Normocephalic and atraumatic.   Eyes: Conjunctivae and EOM are normal. No scleral icterus.   Neck: Symmetrical. Normal range of motion. Neck supple. No tracheal deviation present. THYROID: without masses or tenderness.  Cardiovascular: Normal rate, no rhythm abnormality noted. Extremities without swelling or edema, warm.    Pulmonary/Chest: Normal respiratory Effort. No distress or retractions. She exhibits no tenderness.  Abdominal: Soft. She exhibits no distension, hernias or masses. There is no tenderness. No enlargement of liver edge or spleen.  There is no rebound and no guarding.   Genitourinary:    External rectal exam shows no thrombosed external hemorrhoids, no lesions.     Pelvic exam was performed with patient  supine.   anterior labial fusion, and symmetrical. Pale - c/w lichenoid changes.    There is no rash, lesion or injury on the right labia.   There is no rash, lesion or injury on the left labia.   No bleeding and no signs of injury around the vaginal introitus, urethral meatus is normal size and without prolapse or lesions, urethra well supported. No vaginal discharge found. Cuff healed well   No significant Cystocele, Enterocele or rectocele, and cervix and uterus well supported.   Bimanual exam:   The urethra is normal to palpation and there are no palpable vaginal wall masses.  Shortened       Vaginal, no masses or tenderness. Right adnexum displays no mass or nodularity and no tenderness.   Left adnexum displays no mass or nodularity and no tenderness.  Musculoskeletal: Normal range of motion.   Neurological: She is alert and oriented to person, place, and time. Coordination normal.   Skin: Skin is warm and dry. She is not diaphoretic. No rashes, lesions or ulcers.   Psychiatric: She has a normal mood and affect, oriented to person, place, and time.              Assessment:  Vaginal irritation  1. Dysuria  phenazopyridine (PYRIDIUM) 200 MG tablet       Plan:  Continue estroven vaginal cream twice weekly  Topical clobetasol BOD - Qd - QOD over 6 weeks  Urine culture  Pyridium until culture returns  Patient informed will be contacted with results within 2 weeks. Encouraged to please call back or email if she has not heard from us by then.

## 2020-02-06 LAB — BACTERIA UR CULT: NO GROWTH

## 2020-02-19 ENCOUNTER — TELEPHONE (OUTPATIENT)
Dept: OBSTETRICS AND GYNECOLOGY | Facility: CLINIC | Age: 70
End: 2020-02-19

## 2020-02-19 ENCOUNTER — TELEPHONE (OUTPATIENT)
Dept: OPTOMETRY | Facility: CLINIC | Age: 70
End: 2020-02-19

## 2020-02-19 RX ORDER — METHENAMINE HIPPURATE 1000 MG/1
1 TABLET ORAL 2 TIMES DAILY
Qty: 60 TABLET | Refills: 1 | Status: SHIPPED | OUTPATIENT
Start: 2020-02-19 | End: 2020-06-09

## 2020-02-19 NOTE — TELEPHONE ENCOUNTER
[facility] sent fax stating Phenazopyridine 200mg not covered. There is a preferred alternative of Methenaminehippurate.     Please send alternative if ok.

## 2020-02-19 NOTE — TELEPHONE ENCOUNTER
----- Message from Rebecca Keys sent at 2/19/2020 12:43 PM CST -----  Contact: self  Type:  Same Day Appointment Request    Caller is requesting a same day appointment.  Caller declined first available appointment listed below.      Name of Caller:  self  When is the first available appointment?  02/20/20  Symptoms:  grayish silver white beaming, wavey spots left eye for last 30 minutes  Best Call Back Number:  [phone] (home)   Additional Information:   Patient would like to be seen today. Please call patient. Thanks!

## 2020-03-20 ENCOUNTER — TELEPHONE (OUTPATIENT)
Dept: OPTOMETRY | Facility: CLINIC | Age: 70
End: 2020-03-20

## 2020-04-23 ENCOUNTER — TELEPHONE (OUTPATIENT)
Dept: OBSTETRICS AND GYNECOLOGY | Facility: CLINIC | Age: 70
End: 2020-04-23

## 2020-04-23 ENCOUNTER — LAB VISIT (OUTPATIENT)
Dept: LAB | Facility: HOSPITAL | Age: 70
End: 2020-04-23
Attending: INTERNAL MEDICINE
Payer: MEDICARE

## 2020-04-23 DIAGNOSIS — N39.0 UTI (URINARY TRACT INFECTION): Primary | ICD-10-CM

## 2020-04-23 LAB
BACTERIA #/AREA URNS HPF: NORMAL /HPF
BILIRUB UR QL STRIP: NEGATIVE
CLARITY UR: CLEAR
COLOR UR: YELLOW
GLUCOSE UR QL STRIP: NEGATIVE
HGB UR QL STRIP: NEGATIVE
KETONES UR QL STRIP: NEGATIVE
LEUKOCYTE ESTERASE UR QL STRIP: ABNORMAL
MICROSCOPIC COMMENT: NORMAL
NITRITE UR QL STRIP: NEGATIVE
PH UR STRIP: 6 [PH] (ref 5–8)
PROT UR QL STRIP: NEGATIVE
SP GR UR STRIP: <=1.005 (ref 1–1.03)
SQUAMOUS #/AREA URNS HPF: 1 /HPF
URN SPEC COLLECT METH UR: ABNORMAL
WBC #/AREA URNS HPF: 2 /HPF (ref 0–5)

## 2020-04-23 PROCEDURE — 81000 URINALYSIS NONAUTO W/SCOPE: CPT | Mod: PO

## 2020-04-23 PROCEDURE — 87086 URINE CULTURE/COLONY COUNT: CPT

## 2020-04-23 NOTE — TELEPHONE ENCOUNTER
----- Message from Irene Johnston MA sent at 4/23/2020  4:23 PM CDT -----  Contact: patient  Type: Needs Medical Advice  Who Called:  Herminia  Armando Call Back Number:   Additional Information: patient is having issues getting a culture ordered.  She has been on bactrim for four days.  She states Dr Coronel was supposed to send over an order yesterday

## 2020-04-23 NOTE — TELEPHONE ENCOUNTER
Patient called stating that she had called the office yesterday to get an order for a urine culture and has not received word back from the office and has been trying to go and get this lab done. I apologized to patient but we had not received a call regarding this matter. I offered to hear what the patient's complaint was. Pt stated that the matter had already been handled and proceeded to hang up the phone.

## 2020-04-24 LAB — BACTERIA UR CULT: NORMAL

## 2020-04-28 ENCOUNTER — TELEPHONE (OUTPATIENT)
Dept: UROLOGY | Facility: CLINIC | Age: 70
End: 2020-04-28

## 2020-04-28 ENCOUNTER — TELEPHONE (OUTPATIENT)
Dept: OBSTETRICS AND GYNECOLOGY | Facility: CLINIC | Age: 70
End: 2020-04-28

## 2020-04-28 NOTE — TELEPHONE ENCOUNTER
Called to advised this patient not seen through the [facility] system. Gave phone number to 's private practice.

## 2020-04-28 NOTE — TELEPHONE ENCOUNTER
----- Message from Jose Juan Billings sent at 4/28/2020  1:59 PM CDT -----  Contact: Dr Inge uGtierrez [phone]  Type: Needs Medical Advice    Who Called:  Dr Inge Gutierrez [phone]    Symptoms (please be specific):  Urinary tract issues.    Additional Information: Needs to discuss the ptnt's condition with Dr Ortiz. Please call.

## 2020-04-28 NOTE — TELEPHONE ENCOUNTER
----- Message from Jose Juan Billings sent at 4/28/2020  1:52 PM CDT -----  Contact: Dr Inge Gutierrez [phone]  Type: Needs Medical Advice    Who Called:  Dr Inge Gutierrez [phone]    Symptoms (please be specific):  Urinary tract issues.    Additional Information: Advised needs to discuss ptnt's condition with Dr Bejarano. Please call.

## 2020-06-09 ENCOUNTER — OFFICE VISIT (OUTPATIENT)
Dept: GASTROENTEROLOGY | Facility: CLINIC | Age: 70
End: 2020-06-09
Payer: MEDICARE

## 2020-06-09 VITALS
HEIGHT: 59 IN | BODY MASS INDEX: 37.11 KG/M2 | HEART RATE: 64 BPM | DIASTOLIC BLOOD PRESSURE: 47 MMHG | WEIGHT: 184.06 LBS | SYSTOLIC BLOOD PRESSURE: 143 MMHG

## 2020-06-09 DIAGNOSIS — Z87.19 HISTORY OF IBS: ICD-10-CM

## 2020-06-09 DIAGNOSIS — Z86.010 HISTORY OF COLON POLYPS: ICD-10-CM

## 2020-06-09 DIAGNOSIS — R10.9 LEFT SIDED ABDOMINAL PAIN: Primary | ICD-10-CM

## 2020-06-09 DIAGNOSIS — K21.00 GASTROESOPHAGEAL REFLUX DISEASE WITH ESOPHAGITIS: ICD-10-CM

## 2020-06-09 DIAGNOSIS — K59.00 CONSTIPATION, UNSPECIFIED CONSTIPATION TYPE: ICD-10-CM

## 2020-06-09 DIAGNOSIS — K92.1 HEMATOCHEZIA: ICD-10-CM

## 2020-06-09 DIAGNOSIS — Z87.19 HISTORY OF GASTRITIS: ICD-10-CM

## 2020-06-09 DIAGNOSIS — Z87.19 HISTORY OF DIVERTICULITIS: ICD-10-CM

## 2020-06-09 DIAGNOSIS — R19.8 STRAINING DURING BOWEL MOVEMENTS: ICD-10-CM

## 2020-06-09 DIAGNOSIS — R14.0 ABDOMINAL BLOATING: ICD-10-CM

## 2020-06-09 PROCEDURE — 1159F MED LIST DOCD IN RCRD: CPT | Mod: S$GLB,,, | Performed by: NURSE PRACTITIONER

## 2020-06-09 PROCEDURE — 1101F PR PT FALLS ASSESS DOC 0-1 FALLS W/OUT INJ PAST YR: ICD-10-PCS | Mod: CPTII,S$GLB,, | Performed by: NURSE PRACTITIONER

## 2020-06-09 PROCEDURE — 99999 PR PBB SHADOW E&M-EST. PATIENT-LVL IV: ICD-10-PCS | Mod: PBBFAC,,, | Performed by: NURSE PRACTITIONER

## 2020-06-09 PROCEDURE — 99214 PR OFFICE/OUTPT VISIT, EST, LEVL IV, 30-39 MIN: ICD-10-PCS | Mod: S$GLB,,, | Performed by: NURSE PRACTITIONER

## 2020-06-09 PROCEDURE — 99999 PR PBB SHADOW E&M-EST. PATIENT-LVL IV: CPT | Mod: PBBFAC,,, | Performed by: NURSE PRACTITIONER

## 2020-06-09 PROCEDURE — 1126F AMNT PAIN NOTED NONE PRSNT: CPT | Mod: S$GLB,,, | Performed by: NURSE PRACTITIONER

## 2020-06-09 PROCEDURE — 99214 OFFICE O/P EST MOD 30 MIN: CPT | Mod: S$GLB,,, | Performed by: NURSE PRACTITIONER

## 2020-06-09 PROCEDURE — 1126F PR PAIN SEVERITY QUANTIFIED, NO PAIN PRESENT: ICD-10-PCS | Mod: S$GLB,,, | Performed by: NURSE PRACTITIONER

## 2020-06-09 PROCEDURE — 3078F DIAST BP <80 MM HG: CPT | Mod: CPTII,S$GLB,, | Performed by: NURSE PRACTITIONER

## 2020-06-09 PROCEDURE — 3077F SYST BP >= 140 MM HG: CPT | Mod: CPTII,S$GLB,, | Performed by: NURSE PRACTITIONER

## 2020-06-09 PROCEDURE — 3078F PR MOST RECENT DIASTOLIC BLOOD PRESSURE < 80 MM HG: ICD-10-PCS | Mod: CPTII,S$GLB,, | Performed by: NURSE PRACTITIONER

## 2020-06-09 PROCEDURE — 3077F PR MOST RECENT SYSTOLIC BLOOD PRESSURE >= 140 MM HG: ICD-10-PCS | Mod: CPTII,S$GLB,, | Performed by: NURSE PRACTITIONER

## 2020-06-09 PROCEDURE — 1101F PT FALLS ASSESS-DOCD LE1/YR: CPT | Mod: CPTII,S$GLB,, | Performed by: NURSE PRACTITIONER

## 2020-06-09 PROCEDURE — 1159F PR MEDICATION LIST DOCUMENTED IN MEDICAL RECORD: ICD-10-PCS | Mod: S$GLB,,, | Performed by: NURSE PRACTITIONER

## 2020-06-09 RX ORDER — ALUMINUM HYDROXIDE, MAGNESIUM HYDROXIDE, AND SIMETHICONE 2400; 240; 2400 MG/30ML; MG/30ML; MG/30ML
SUSPENSION ORAL EVERY 6 HOURS PRN
COMMUNITY

## 2020-06-09 NOTE — PATIENT INSTRUCTIONS
Abdominal Pain    Abdominal pain is pain in the stomach or belly area. Everyone has this pain from time to time. In many cases it goes away on its own. But abdominal pain can sometimes be due to a serious problem, such as appendicitis. So its important to know when to seek help.  Causes of abdominal pain  There are many possible causes of abdominal pain. Common causes in adults include:  · Constipation, diarrhea, or gas  · Stomach acid flowing back up into the esophagus (acid reflux or heartburn)  · Severe acid reflux, called GERD (gastroesophageal reflux disease)  · A sore in the lining of the stomach or small intestine (peptic ulcer)  · Inflammation of the gallbladder, liver, or pancreas  · Gallstones or kidney stones  · Appendicitis   · Intestinal blockage   · An internal organ pushing through a muscle or other tissue (hernia)  · Urinary tract infections  · In women, menstrual cramps, fibroids, or endometriosis  · Inflammation or infection of the intestines  Diagnosing the cause of abdominal pain  Your healthcare provider will do a physical exam help find the cause of your pain. If needed, tests will be ordered. Belly pain has many possible causes. So it can be hard to find the reason for your pain. Giving details about your pain can help. Tell your provider where and when you feel the pain, and what makes it better or worse. Also let your provider know if you have other symptoms such as:  · Fever  · Tiredness  · Upset stomach (nausea)  · Vomiting  · Changes in bathroom habits  Treating abdominal pain  Some causes of pain need emergency medical treatment right away. These include appendicitis or a bowel blockage. Other problems can be treated with rest, fluids, or medicines. Your healthcare provider can give you specific instructions for treatment or self-care based on what is causing your pain.  If you have vomiting or diarrhea, sip water or other clear fluids. When you are ready to eat solid foods again,  start with small amounts of easy-to-digest, low-fat foods. These include apple sauce, toast, or crackers.   When to seek medical care  Call 911 or go to the hospital right away if you:  · Cant pass stool and are vomiting  · Are vomiting blood or have bloody diarrhea or black, tarry diarrhea  · Have chest, neck, or shoulder pain  · Feel like you might pass out  · Have pain in your shoulder blades with nausea  · Have sudden, severe belly pain  · Have new, severe pain unlike any you have felt before  · Have a belly that is rigid, hard, and tender to touch  Call your healthcare provider if you have:  · Pain for more than 5 days  · Bloating for more than 2 days  · Diarrhea for more than 5 days  · A fever of 100.4°F (38.0°C) or higher, or as directed by your provider  · Pain that gets worse  · Weight loss for no reason  · Continued lack of appetite  · Blood in your stool  How to prevent abdominal pain  Here are some tips to help prevent abdominal pain:  · Eat smaller amounts of food at one time.  · Avoid greasy, fried, or other high-fat foods.  · Avoid foods that give you gas.  · Exercise regularly.  · Drink plenty of fluids.  To help prevent GERD symptoms:  · Quit smoking.  · Reduce alcohol and certain foods that increase stomach acid.  · Avoid aspirin and over-the-counter pain and fever medicines (NSAIDS or nonsteroidal anti-inflammatory drugs), if possible  · Lose extra weight.  · Finish eating at least 2 hours before you go to bed or lie down.  · Raise the head of your bed.  Date Last Reviewed: 7/1/2016  © 2870-5113 [employer]. [address] [place] [address], [place] [address]. All rights reserved. This information is not intended as a substitute for professional medical care. Always follow your healthcare professional's instructions.

## 2020-06-09 NOTE — PROGRESS NOTES
Subjective:       Patient ID: Herminia Wooten is a 69 y.o. female, Body mass index is 37.18 kg/m².    Chief Complaint: Abdominal Pain      Patient is new to me. Established patient of Dr. Luna and Lesvia Azevedo NP.    Abdominal Pain   This is a new problem. The current episode started 1 to 4 weeks ago (Started 3-4 weeks ago). The onset quality is sudden. The problem occurs intermittently. The problem has been waxing and waning. The pain is located in the LLQ and LUQ. The quality of the pain is aching (describes pain is similar to when she had diverticulitis). The abdominal pain does not radiate. Associated symptoms include constipation (reports frequent straining to have a bowel movement; reports stool is stoft, denies hard stool; bowel movements are 4-5 times per week; occ takes Dulcolax OTC PRN; started metamucil yesterday), flatus and hematochezia (reports small amount of bright red blood in toilet bowl and on tissue paper after bowel movements; denies bleeding in between bowel movements; started several months ago). Pertinent negatives include no anorexia, belching, diarrhea, dysuria, fever, melena, nausea, vomiting or weight loss. Associated symptoms comments: Associated symptoms include abdominal bloating  . The pain is aggravated by palpation. The pain is relieved by nothing. She has tried proton pump inhibitors (Currently: Omeprazole 40 mg once daily) for the symptoms. Her past medical history is significant for abdominal surgery (hx of appendectomy and hysterectomy), gallstones (hx of cholecystectomy), GERD (occ heartburn; Omeprazole helping) and irritable bowel syndrome. There is no history of colon cancer, Crohn's disease, pancreatitis, PUD or ulcerative colitis.     Review of Systems   Constitutional: Negative for appetite change, fever, unexpected weight change and weight loss.   HENT: Negative for trouble swallowing.    Respiratory: Negative for cough and shortness of breath.    Cardiovascular:  Negative for chest pain.   Gastrointestinal: Positive for abdominal pain, constipation (reports frequent straining to have a bowel movement; reports stool is stoft, denies hard stool; bowel movements are 4-5 times per week; occ takes Dulcolax OTC PRN; started metamucil yesterday), flatus and hematochezia (reports small amount of bright red blood in toilet bowl and on tissue paper after bowel movements; denies bleeding in between bowel movements; started several months ago). Negative for anorexia, blood in stool, diarrhea, melena, nausea and vomiting.   Genitourinary: Negative for difficulty urinating and dysuria.   Musculoskeletal: Negative for gait problem.   Skin: Negative for rash.   Neurological: Negative for speech difficulty.   Psychiatric/Behavioral: Negative for confusion.       Past Medical History:   Diagnosis Date    Anesthesia complication     elevated BP    Coronary artery disease     Diverticulitis     Diverticulosis     Encounter for blood transfusion     Fatty liver 4/25/2016    GERD (gastroesophageal reflux disease)     Hyperlipidemia     Hypertension     Hypothyroid     Irritable bowel syndrome     Kidney stone     Liver hemangioma     Seasonal allergies     Thyroid disease       Past Surgical History:   Procedure Laterality Date    ABDOMINAL SURGERY      APPENDECTOMY      CARDIAC CATHETERIZATION      2 stents    CHOLECYSTECTOMY      COLECTOMY      endometriosis- 8.5 inches removed    COLONOSCOPY  2016    repeat in 5    COLONOSCOPY N/A 6/27/2016    Procedure: COLONOSCOPY;  Surgeon: Avel Luna Jr., MD;  Location: [facility];  Service: Endoscopy;  Laterality: N/A; repeat in 5 years for surveillance    CORONARY ANGIOPLASTY WITH STENT PLACEMENT      x 2    HYSTERECTOMY      INCONTINENCE SURGERY      INJECTION OF ANESTHETIC AGENT INTO SACROILIAC JOINT Right 7/3/2019    Procedure: BLOCK, SACROILIAC JOINT;  Surgeon: Homer Diamond MD;  Location: [facility];  Service: Pain  Management;  Laterality: Right;    INJECTION OF FACET JOINT Right 8/28/2019    Procedure: INJECTION, FACET JOINT;  Surgeon: Homer Diamond MD;  Location: [facility];  Service: Pain Management;  Laterality: Right;  L4 and L5    JOINT REPLACEMENT Right     Knee    OOPHORECTOMY      rectocele  05/2017    repair    Stent OM      UPPER GASTROINTESTINAL ENDOSCOPY  6/2013 Dr. Peters    reflux esophagitis; biopsy: negative dysplasia, intestinal metaplasia; mild chronic inflammation- GERD related & regenerative glandular epithelial change; strips of squamous esophageal mucosa with moderate basal epithelial cell hyperplasia and rare intraepithelial eosinophils (< 1 per 10 high power fields)    UPPER GASTROINTESTINAL ENDOSCOPY  06/2016    Dr. de la cruz    URETHRA SURGERY        Family History   Problem Relation Age of Onset    Clotting disorder Maternal Grandmother     Diverticulitis Maternal Grandfather     Anesthesia problems Neg Hx     Breast cancer Neg Hx     Ovarian cancer Neg Hx     Colon cancer Neg Hx     Colon polyps Neg Hx     Crohn's disease Neg Hx     Ulcerative colitis Neg Hx       Wt Readings from Last 10 Encounters:   06/09/20 83.5 kg (184 lb 1.4 oz)   02/05/20 78.7 kg (173 lb 8 oz)   11/18/19 79.2 kg (174 lb 9.7 oz)   08/28/19 87.5 kg (192 lb 14.4 oz)   08/23/19 73.5 kg (162 lb)   08/23/19 73.8 kg (162 lb 11.2 oz)   07/03/19 73.8 kg (162 lb 11.2 oz)   05/08/19 72.3 kg (159 lb 6.3 oz)   03/12/19 75.5 kg (166 lb 7.2 oz)   02/28/19 76.1 kg (167 lb 12.3 oz)     Lab Results   Component Value Date    WBC 7.92 03/12/2019    HGB 12.6 03/12/2019    HCT 40.3 03/12/2019    MCV 92 03/12/2019     03/12/2019     CMP  Sodium   Date Value Ref Range Status   03/12/2019 138 136 - 145 mmol/L Final     Potassium   Date Value Ref Range Status   03/12/2019 4.0 3.5 - 5.1 mmol/L Final     Chloride   Date Value Ref Range Status   03/12/2019 101 95 - 110 mmol/L Final     CO2   Date Value Ref Range Status    03/12/2019 29 22 - 31 mmol/L Final     Glucose   Date Value Ref Range Status   03/12/2019 108 70 - 110 mg/dL Final     Comment:     The ADA recommends the following guidelines for fasting glucose:  Normal:       less than 100 mg/dL  Prediabetes:  100 mg/dL to 125 mg/dL  Diabetes:     126 mg/dL or higher       BUN, Bld   Date Value Ref Range Status   03/12/2019 13 7 - 18 mg/dL Final     Creatinine   Date Value Ref Range Status   03/12/2019 0.66 0.50 - 1.40 mg/dL Final     Calcium   Date Value Ref Range Status   03/12/2019 9.9 8.4 - 10.2 mg/dL Final     Total Protein   Date Value Ref Range Status   03/12/2019 7.8 6.0 - 8.4 g/dL Final     Albumin   Date Value Ref Range Status   03/12/2019 4.8 3.5 - 5.2 g/dL Final     Total Bilirubin   Date Value Ref Range Status   03/12/2019 0.5 0.2 - 1.3 mg/dL Final     Alkaline Phosphatase   Date Value Ref Range Status   03/12/2019 114 38 - 145 U/L Final     AST   Date Value Ref Range Status   03/12/2019 33 14 - 36 U/L Final     ALT   Date Value Ref Range Status   03/12/2019 27 10 - 44 U/L Final     Anion Gap   Date Value Ref Range Status   03/12/2019 8 8 - 16 mmol/L Final     eGFR if    Date Value Ref Range Status   03/12/2019 >60 >60 mL/min/1.73 m^2 Final     eGFR if non    Date Value Ref Range Status   03/12/2019 >60 >60 mL/min/1.73 m^2 Final     Comment:     Calculation used to obtain the estimated glomerular filtration  rate (eGFR) is the CKD-EPI equation.        Lab Results   Component Value Date    AMYLASE 82 04/13/2016     Lab Results   Component Value Date    LIPASE 25 02/28/2019     Lab Results   Component Value Date    LIPASERES 106 03/12/2019             Reviewed prior medical records including radiology report of HIDA scan 3/13/19, US of abdomen 3/4/19, and X-Ray of abdomen 2/28/19 & endoscopy history (see surgical history).     Objective:      Physical Exam   Constitutional: She is oriented to person, place, and time. Vital signs are  normal. She appears well-developed and well-nourished. She is cooperative. She does not have a sickly appearance. No distress.   HENT:   Head: Normocephalic.   Right Ear: Hearing normal.   Left Ear: Hearing normal.   Nose: Nose normal.   Pt wearing mask due to COVID concerns.   Eyes: Pupils are equal, round, and reactive to light. Conjunctivae and lids are normal.   Neck: Trachea normal and normal range of motion.   Cardiovascular: Normal rate, regular rhythm and normal heart sounds.   No murmur heard.  Pulmonary/Chest: Effort normal and breath sounds normal. No stridor. No respiratory distress. She has no wheezes.   Abdominal: Soft. Bowel sounds are normal. She exhibits no distension, no ascites and no mass. There is tenderness in the left upper quadrant and left lower quadrant. There is no rebound and no guarding.   Obese abdomen   Musculoskeletal: Normal range of motion.   Neurological: She is alert and oriented to person, place, and time.   Skin: Skin is warm, dry and intact. No rash noted.   Non jaundiced   Psychiatric: She has a normal mood and affect. Her speech is normal and behavior is normal.         Assessment:       1. Left sided abdominal pain    2. History of diverticulitis    3. Abdominal bloating    4. Gastroesophageal reflux disease with esophagitis    5. History of gastritis    6. Constipation, unspecified constipation type    7. Straining during bowel movements    8. Hematochezia    9. History of IBS    10. History of colon polyps           Plan:   All diagnoses and orders for this visit:    Left sided abdominal pain & History of diverticulitis  - Lipase; Future; Expected date: 06/09/2020  - Comprehensive metabolic panel; Future; Expected date: 06/09/2020  - CBC auto differential; Future; Expected date: 06/09/2020  - CT Abdomen Pelvis With Contrast; Future; Expected date: 06/09/2020  - Schedule Colonoscopy to be done in 4-6 weeks, discussed procedure with the patient, verbalized understanding.  -  Discussed the diagnosis of diverticulosis and diverticulitis, advised to continue a low fiber diet for the next 4 weeks and then slowly increase fiber in diet. Advised a low fiber diet is recommended for diverticulitis (for about 4 weeks), but to prevent diverticulitis, high fiber diet is recommended.    Abdominal bloating   - Schedule EGD, discussed procedure with patient, including risks and benefits, patient verbalized understanding   - Recommended OTC simethicone as directed, such as Phazyme or Gas-x   - Discussed with patient about low gas diet: Reduce or eliminate these foods from your diet: Broccoli, Cauliflower, Alpha sprouts, Cabbage, Cooked dried beans, Carbonated beverages (sparkling water, soda, beer, champagne)    Gastroesophageal reflux disease with esophagitis & History of gastritis   - Schedule EGD, discussed procedure with patient, including risks and benefits, patient verbalized understanding   - Continue Omeprazole 40 mg once daily   - Take PPI in the morning 30 minutes before breakfast   - Recommend to avoid large meals, avoid eating within 3 hours of bedtime, elevate head of bed if nocturnal symptoms are present, smoking cessation (if current smoker), & weight loss (if overweight).    - Recommend minimize/avoid high-fat foods, chocolate, caffeine, citrus, alcohol, & tomato products.   - Advised to avoid/limit use of NSAID's, since they can cause GI upset, bleeding, and/or ulcers. If needed, take with food.     Constipation, unspecified constipation type & Straining during bowel movements   - Schedule Colonoscopy to be done in 4-6 weeks, discussed procedure with the patient, including risks and benefits, patient verbalized understanding   - Recommend daily exercise as tolerated, adequate water intake, and high fiber diet.    - Recommend OTC fiber supplement    - Recommend OTC stool softener    - Recommend OTC MiraLax once daily (17g PO) as directed    Hematochezia  - CBC auto differential;  Future; Expected date: 06/09/2020  - Discussed about different etiologies that can cause rectal bleeding, such as but not limited to diverticulosis, polyps, colon mass, colon inflammation or infection, anal fissure or hemorrhoids.   - Schedule Colonoscopy to be done in 4-6 weeks, discussed procedure with the patient, verbalized understanding         - Avoid/minimize aspirin and anti-inflammatory drugs such as ibuprofen (Advil, Motrin) and naproxen (Aleve and Naprosyn).    History of IBS    History of colon polyps    If no improvement in symptoms or symptoms worsen, call/follow-up at clinic or go to ER

## 2020-06-15 ENCOUNTER — HOSPITAL ENCOUNTER (OUTPATIENT)
Dept: RADIOLOGY | Facility: HOSPITAL | Age: 70
Discharge: HOME | End: 2020-06-15
Attending: NURSE PRACTITIONER
Payer: MEDICARE

## 2020-06-15 DIAGNOSIS — Z87.19 HISTORY OF DIVERTICULITIS: ICD-10-CM

## 2020-06-15 DIAGNOSIS — R10.9 LEFT SIDED ABDOMINAL PAIN: ICD-10-CM

## 2020-06-15 PROCEDURE — 74177 CT ABD & PELVIS W/CONTRAST: CPT | Mod: 26,,, | Performed by: RADIOLOGY

## 2020-06-15 PROCEDURE — A9698 NON-RAD CONTRAST MATERIALNOC: HCPCS | Mod: PO | Performed by: NURSE PRACTITIONER

## 2020-06-15 PROCEDURE — 74177 CT ABDOMEN PELVIS WITH CONTRAST: ICD-10-PCS | Mod: 26,,, | Performed by: RADIOLOGY

## 2020-06-15 PROCEDURE — 25500020 PHARM REV CODE 255: Mod: PO | Performed by: NURSE PRACTITIONER

## 2020-06-15 PROCEDURE — 74177 CT ABD & PELVIS W/CONTRAST: CPT | Mod: TC,PO

## 2020-06-15 RX ADMIN — IOHEXOL 75 ML: 350 INJECTION, SOLUTION INTRAVENOUS at 12:06

## 2020-06-15 RX ADMIN — IOHEXOL 1000 ML: 9 SOLUTION ORAL at 12:06

## 2020-06-17 DIAGNOSIS — Z87.19 HISTORY OF GASTROESOPHAGEAL REFLUX (GERD): ICD-10-CM

## 2020-06-17 DIAGNOSIS — R11.0 NAUSEA: ICD-10-CM

## 2020-06-17 RX ORDER — OMEPRAZOLE 40 MG/1
40 CAPSULE, DELAYED RELEASE ORAL DAILY
Qty: 90 CAPSULE | Refills: 2 | Status: SHIPPED | OUTPATIENT
Start: 2020-06-17 | End: 2023-04-20 | Stop reason: SDUPTHER

## 2020-06-17 NOTE — TELEPHONE ENCOUNTER
----- Message from Nita Johnson sent at 2020  8:24 AM CDT -----  Regarding: Refill  Contact: PT  Type:  RX Refill Request    Who Called:  PT    Refill or New Rx:  refill  RX Name and Strength:  omeprazole (PRILOSEC) 40 MG capsule ()  How is the patient currently taking it? (ex. 1XDay):  As Directed  Is this a 30 day or 90 day RX:  as Directed  Preferred Pharmacy with phone number:   [facility] - [place], [place] - [address]  [address] [address]  Phone: [phone] Fax: [phone]  Best Call Back Number: [phone]  Additional Information:  Please Advise ---Thank you

## 2020-06-25 ENCOUNTER — TELEPHONE (OUTPATIENT)
Dept: GASTROENTEROLOGY | Facility: CLINIC | Age: 70
End: 2020-06-25

## 2020-06-25 NOTE — TELEPHONE ENCOUNTER
----- Message from Melonie Starr sent at 6/25/2020 10:02 AM CDT -----  Regarding: Upcoming procedure  Contact: self  Placed call to pod, patient want to speak with a nurse regarding possible rescheduling upcoming procedure please call back at [phone] (home)     Case number [id number]

## 2020-07-20 DIAGNOSIS — Z01.812 PRE-PROCEDURE LAB EXAM: ICD-10-CM

## 2020-07-21 ENCOUNTER — TELEPHONE (OUTPATIENT)
Dept: GASTROENTEROLOGY | Facility: CLINIC | Age: 70
End: 2020-07-21

## 2020-07-24 ENCOUNTER — LAB VISIT (OUTPATIENT)
Dept: FAMILY MEDICINE | Facility: CLINIC | Age: 70
End: 2020-07-24
Payer: MEDICARE

## 2020-07-24 ENCOUNTER — ANESTHESIA EVENT (OUTPATIENT)
Dept: ENDOSCOPY | Facility: HOSPITAL | Age: 70
End: 2020-07-24
Payer: MEDICARE

## 2020-07-24 ENCOUNTER — TELEPHONE (OUTPATIENT)
Dept: GASTROENTEROLOGY | Facility: CLINIC | Age: 70
End: 2020-07-24

## 2020-07-24 DIAGNOSIS — Z01.812 PRE-PROCEDURE LAB EXAM: ICD-10-CM

## 2020-07-24 PROCEDURE — U0003 INFECTIOUS AGENT DETECTION BY NUCLEIC ACID (DNA OR RNA); SEVERE ACUTE RESPIRATORY SYNDROME CORONAVIRUS 2 (SARS-COV-2) (CORONAVIRUS DISEASE [COVID-19]), AMPLIFIED PROBE TECHNIQUE, MAKING USE OF HIGH THROUGHPUT TECHNOLOGIES AS DESCRIBED BY CMS-2020-01-R: HCPCS

## 2020-07-24 NOTE — TELEPHONE ENCOUNTER
----- Message from Margarita Yanique sent at 7/24/2020 11:55 AM CDT -----  Contact: Shante  tel:  [phone]  Shante kothari/ [employer] [facility] [employer]  tel:   [phone]     / Needs to discuss prior authorization with you for the EGD.      Calling about this since EGD  is not meeting criteria level.    With draw the request or they can send it to have a peer to peer discuss .  Which do you choose?   Pls call to discuss.

## 2020-07-25 LAB — SARS-COV-2 RNA RESP QL NAA+PROBE: NOT DETECTED

## 2020-07-26 NOTE — H&P
History & Physical - Short Stay  Gastroenterology      SUBJECTIVE:     Procedure: Gastroscopy and Colonoscopy    Chief Complaint/Indication for Procedure: LUQ and LLQ abdo pain.  Bloating.  GERD.  Hematochezia.  Hx of colon polyps.    History of Present Illness:  Office Visit    6/9/2020  [facility] - G[facility]     Rachel Dunn NP  Gastroenterology  Left sided abdominal pain +9 more  Dx  Abdominal Pain ; Referred by Aaareferral Self  Reason for Visit   Progress Notes  Rachel Dunn NP (Nurse Practitioner)   Gastroenterology   6/9/2020  3:30 PM   Signed     Subjective:       Patient ID: Herminia Wooten is a 69 y.o. female, Body mass index is 37.18 kg/m².     Chief Complaint: Abdominal Pain       Patient is new to me. Established patient of Dr. Luna and Lesvia Azevedo NP.     Abdominal Pain   This is a new problem. The current episode started 1 to 4 weeks ago (Started 3-4 weeks ago). The onset quality is sudden. The problem occurs intermittently. The problem has been waxing and waning. The pain is located in the LLQ and LUQ. The quality of the pain is aching (describes pain is similar to when she had diverticulitis). The abdominal pain does not radiate. Associated symptoms include constipation (reports frequent straining to have a bowel movement; reports stool is stoft, denies hard stool; bowel movements are 4-5 times per week; occ takes Dulcolax OTC PRN; started metamucil yesterday), flatus and hematochezia (reports small amount of bright red blood in toilet bowl and on tissue paper after bowel movements; denies bleeding in between bowel movements; started several months ago). Pertinent negatives include no anorexia, belching, diarrhea, dysuria, fever, melena, nausea, vomiting or weight loss. Associated symptoms comments: Associated symptoms include abdominal bloating  . The pain is aggravated by palpation. The pain is relieved by nothing. She has tried proton pump inhibitors (Currently:  Omeprazole 40 mg once daily) for the symptoms. Her past medical history is significant for abdominal surgery (hx of appendectomy and hysterectomy), gallstones (hx of cholecystectomy), GERD (occ heartburn; Omeprazole helping) and irritable bowel syndrome. There is no history of colon cancer, Crohn's disease, pancreatitis, PUD or ulcerative colitis.      Review of Systems   Constitutional: Negative for appetite change, fever, unexpected weight change and weight loss.   HENT: Negative for trouble swallowing.    Respiratory: Negative for cough and shortness of breath.    Cardiovascular: Negative for chest pain.   Gastrointestinal: Positive for abdominal pain, constipation (reports frequent straining to have a bowel movement; reports stool is stoft, denies hard stool; bowel movements are 4-5 times per week; occ takes Dulcolax OTC PRN; started metamucil yesterday), flatus and hematochezia (reports small amount of bright red blood in toilet bowl and on tissue paper after bowel movements; denies bleeding in between bowel movements; started several months ago). Negative for anorexia, blood in stool, diarrhea, melena, nausea and vomiting.     Assessment:       1. Left sided abdominal pain    2. History of diverticulitis    3. Abdominal bloating    4. Gastroesophageal reflux disease with esophagitis    5. History of gastritis    6. Constipation, unspecified constipation type    7. Straining during bowel movements    8. Hematochezia    9. History of IBS    10. History of colon polyps           Plan:   All diagnoses and orders for this visit:     Left sided abdominal pain & History of diverticulitis  - Lipase; Future; Expected date: 06/09/2020  - Comprehensive metabolic panel; Future; Expected date: 06/09/2020  - CBC auto differential; Future; Expected date: 06/09/2020  - CT Abdomen Pelvis With Contrast; Future; Expected date: 06/09/2020  - Schedule Colonoscopy to be done in 4-6 weeks, discussed procedure with the patient,  verbalized understanding.  - Discussed the diagnosis of diverticulosis and diverticulitis, advised to continue a low fiber diet for the next 4 weeks and then slowly increase fiber in diet. Advised a low fiber diet is recommended for diverticulitis (for about 4 weeks), but to prevent diverticulitis, high fiber diet is recommended.     Abdominal bloating              - Schedule EGD, discussed procedure with patient, including risks and benefits, patient verbalized understanding              - Recommended OTC simethicone as directed, such as Phazyme or Gas-x              - Discussed with patient about low gas diet: Reduce or eliminate these foods from your diet: Broccoli, Cauliflower, [place] sprouts, Cabbage, Cooked dried beans, Carbonated beverages (sparkling water, soda, beer, champagne)     Gastroesophageal reflux disease with esophagitis & History of gastritis              - Schedule EGD, discussed procedure with patient, including risks and benefits, patient verbalized understanding              - Continue Omeprazole 40 mg once daily              - Take PPI in the morning 30 minutes before breakfast              - Recommend to avoid large meals, avoid eating within 3 hours of bedtime, elevate head of bed if nocturnal symptoms are present, smoking cessation (if current smoker), & weight loss (if overweight).               - Recommend minimize/avoid high-fat foods, chocolate, caffeine, citrus, alcohol, & tomato products.              - Advised to avoid/limit use of NSAID's, since they can cause GI upset, bleeding, and/or ulcers. If needed, take with food.      Constipation, unspecified constipation type & Straining during bowel movements              - Schedule Colonoscopy to be done in 4-6 weeks, discussed procedure with the patient, including risks and benefits, patient verbalized understanding              - Recommend daily exercise as tolerated, adequate water intake, and high fiber diet.               -  Recommend OTC fiber supplement               - Recommend OTC stool softener               - Recommend OTC MiraLax once daily (17g PO) as directed     Hematochezia  - CBC auto differential; Future; Expected date: 06/09/2020  - Discussed about different etiologies that can cause rectal bleeding, such as but not limited to diverticulosis, polyps, colon mass, colon inflammation or infection, anal fissure or hemorrhoids.   - Schedule Colonoscopy to be done in 4-6 weeks, discussed procedure with the patient, verbalized understanding         - Avoid/minimize aspirin and anti-inflammatory drugs such as ibuprofen (Advil, Motrin) and naproxen (Aleve and Naprosyn).     History of IBS     History of colon polyps            CT SCan 6/15/2020:  Impression:   1. No acute abdominopelvic abnormality.  2. Diverticulosis coli without evidence of diverticulitis.  3. Subcentimeter enhancing right hepatic lobe lesion, which is too small to definitively characterize and appears unchanged as compared to the prior study on 04/25/2017, suggestive of a benign process, such as a flash-filling hemangioma.  4. Subcentimeter left renal hypodensity, possibly a cyst.  5. Aortoiliac atherosclerosis.  6. Stable left lower lobe pulmonary micronodule.      Electronically signed by: Lucien Hall  Date:                                            06/15/2020      See last EGD 6/27/2016:  Impression:          - Normal oropharynx.                        - Reflux esophagitis.                        - Normal esophagus otherwise.                        - Z-line regular, 35 cm from the incisors.                        - Small hiatus hernia.                        - Gastritis. Biopsied.                        - Normal stomach otherwise.                        - Normal examined duodenum.   Recommendation:      - Perform a colonoscopy as previously scheduled.                        - Await pathology and CLOtest results.                        - Follow an antireflux  regimen.                        - Use Protonix (pantoprazole) 40 mg PO daily.                        - Use Zantac (ranitidine) 300 mg PO daily.                        - Call the G.I. clinic in 2 weeks for reports (if                        you haven't heard from us sooner) [phone].                        - Return to GI clinic in 6-8 weeks.   Avel Luna MD   6/27/2016      See last Colonoscopy 6/27/2016:  Impression:          - One 2 to 3 mm polyp in the proximal sigmoid colon.                        Resected and retrieved.                        - One 1 to 2 mm polyp in the mid ascending colon.                        Resected and retrieved.                        - Diverticulosis in the sigmoid colon and in the                        descending colon.                        - Mild colonic spasm consistent with irritable bowel                        syndrome.                        - Internal hemorrhoids.                        - The examination was otherwise normal.                        - The examined portion of the ileum was normal.                        - Fluid aspiration performed.   Recommendation:      - Discharge patient to home.                        - Await pathology results.                        - If the pathology report reveals adenomatous                        tissue, then repeat the colonoscopy for surveillance                        in 5 years.                        - If the pathology report indicates hyperplastic                        polyp, then repeat colonoscopy for surveillance in 6                        years.                        - High fiber diet.                        - Use fiber, for example Citrucel, Fibercon, Konsyl                        or Metamucil.                        - Take a PROBIOTIC, such as a carton of GREEK YOGURT                        (Chobani or Oikos, or Activia or Dannon); or tablets                        of ALIGN or CULTURELLE or NBA-Q (all                         non-prescription), every day for a month.                        - Continue present medications.                        - Use Bentyl (dicyclomine) 10-20 mg PO QID 30 min AC.                        - Call the G.I. clinic in 2 weeks for reports (if                        you haven't heard from us sooner) [phone].                        - Return to GI clinic in 6-8 weeks.                        - Patient has a contact number available for                        emergencies. The signs and symptoms of potential                        delayed complications were discussed with the                        patient. Return to normal activities tomorrow.                        Written discharge instructions were provided to the                        patient.                        - Return to normal activities tomorrow.   Avel Luna MD   6/27/2016    SPECIMEN  1) Antrum.  2) Ascending colon polyp.  3) Sigmoid colon polyp, snared.  FINAL PATHOLOGIC DIAGNOSIS  1. STOMACH (BIOPSY):  -Antrum with features of reactive/chemical gastropathy  -Negative for active inflammation  -Negative for Helicobacter pylori organisms on H&E stain  2. COLON, ASCENDING POLYP (BIOPSY):  - Tubular adenoma  3. COLON, SIGMOID POLYP (BIOPSY):  - Tubular adenoma      PTA Medications   Medication Sig    aluminum & magnesium hydroxide-simethicone (MYLANTA MAX STRENGTH) 400-400-40 mg/5 mL suspension Take by mouth every 6 (six) hours as needed for Indigestion.    atenolol (TENORMIN) 25 MG tablet Take 25 mg by mouth every evening.     atorvastatin (LIPITOR) 20 MG tablet Take 20 mg by mouth once daily.    cholecalciferol, vitamin D3, (VITAMIN D3) 2,000 unit Tab Take 1 tablet by mouth once daily.     estradioL (ESTRACE) 0.01 % (0.1 mg/gram) vaginal cream PLACE 1 GRAM VAGINALLY EVERY MONDAY AND THURSDAY    FLOVENT  mcg/actuation inhaler INHALE 1 PUFF PO Q 12 H    fluticasone (FLONASE) 50 mcg/actuation nasal spray 1 spray  by Each Nare route 2 (two) times daily as needed.     levothyroxine (SYNTHROID) 25 MCG tablet Take 25 mcg by mouth once daily.     lorazepam (ATIVAN) 0.5 MG tablet Take 0.5 mg by mouth every 12 (twelve) hours as needed.     losartan (COZAAR) 50 MG tablet Take 50 mg by mouth once daily.     omeprazole (PRILOSEC) 40 MG capsule Take 1 capsule (40 mg total) by mouth once daily.    polyethylene glycol (GLYCOLAX) 17 gram PwPk Take 17 g by mouth daily as needed.     augmented betamethasone (DIPROLENE) 0.05 % gel FCO THIN FILM AA 4 TO 6 TIMES D. DO NOT USE FOR LONGER THAN 14 DAYS    betamethasone valerate 0.1% (VALISONE) 0.1 % Crea Apply 1 application topically daily as needed.     bisacodyl 5 mg Tab Take 5 mg by mouth once daily.     clobetasol (TEMOVATE) 0.05 % cream APPLY 1/2 GRAM TOPICALLY twice weekly    fluconazole (DIFLUCAN) 150 MG Tab TK 1 T PO D FOR 2 DAYS    mometasone 220 mcg (30 doses) inhaler Inhale 2 puffs into the lungs daily as needed.        Review of patient's allergies indicates:   Allergen Reactions    Codeine Other (See Comments)     nervousness    Caffeine Anxiety     In medications    Percodan [oxycodone hcl-oxycodone-asa] Anxiety        Past Medical History:   Diagnosis Date    Anesthesia complication     elevated BP    Coronary artery disease     Diverticulitis     Diverticulosis     Encounter for blood transfusion     Fatty liver 4/25/2016    GERD (gastroesophageal reflux disease)     Hyperlipidemia     Hypertension     Hypothyroid     Irritable bowel syndrome     Kidney stone     Liver hemangioma     Seasonal allergies     Thyroid disease      Past Surgical History:   Procedure Laterality Date    ABDOMINAL SURGERY      APPENDECTOMY      CARDIAC CATHETERIZATION      2 stents    CHOLECYSTECTOMY      COLECTOMY      endometriosis- 8.5 inches removed    COLONOSCOPY  2016    repeat in 5    COLONOSCOPY N/A 6/27/2016    Procedure: COLONOSCOPY;  Surgeon: Avel Luna  MD [place];  Location: [facility] ENDO;  Service: Endoscopy;  Laterality: N/A; repeat in 5 years for surveillance    CORONARY ANGIOPLASTY WITH STENT PLACEMENT      x 2    HYSTERECTOMY      INCONTINENCE SURGERY      INJECTION OF ANESTHETIC AGENT INTO SACROILIAC JOINT Right 7/3/2019    Procedure: BLOCK, SACROILIAC JOINT;  Surgeon: Homer Diamond MD;  Location: [facility];  Service: Pain Management;  Laterality: Right;    INJECTION OF FACET JOINT Right 2019    Procedure: INJECTION, FACET JOINT;  Surgeon: Homer Diamond MD;  Location: [facility];  Service: Pain Management;  Laterality: Right;  L4 and L5    JOINT REPLACEMENT Right     Knee    OOPHORECTOMY      rectocele  2017    repair    Stent OM      UPPER GASTROINTESTINAL ENDOSCOPY  2013 Dr. Peters    reflux esophagitis; biopsy: negative dysplasia, intestinal metaplasia; mild chronic inflammation- GERD related & regenerative glandular epithelial change; strips of squamous esophageal mucosa with moderate basal epithelial cell hyperplasia and rare intraepithelial eosinophils (< 1 per 10 high power fields)    UPPER GASTROINTESTINAL ENDOSCOPY  2016    Dr. de la cruz    URETHRA SURGERY       Family History   Problem Relation Age of Onset    Clotting disorder Maternal Grandmother     Diverticulitis Maternal Grandfather     Anesthesia problems Neg Hx     Breast cancer Neg Hx     Ovarian cancer Neg Hx     Colon cancer Neg Hx     Colon polyps Neg Hx     Crohn's disease Neg Hx     Ulcerative colitis Neg Hx      Social History     Tobacco Use    Smoking status: Former Smoker     Packs/day: 0.25     Years: 35.00     Pack years: 8.75     Quit date: 10/7/1999     Years since quittin.8    Smokeless tobacco: Never Used   Substance Use Topics    Alcohol use: No    Drug use: No         OBJECTIVE:     Vital Signs (Most Recent)  Temp: 97.3 °F (36.3 °C) (20 1121)  Pulse: 80 (20 1121)  Resp: 18 (20 1121)  BP: (!) 204/88 (20  "1121)  SpO2: 96 % (07/27/20 1121)    Physical Exam:  : Ht 4' 11" (1.499 m)   Wt 83.5 kg (184 lb 1.4 oz)  BMI 37.18 kg/m²                                                       GENERAL:  Comfortable, in no acute distress.                                 HEENT EXAM:  Nonicteric.  No adenopathy.  Oropharynx is clear.               NECK:  Supple.                                                               LUNGS:  Clear.                                                               CARDIAC:  Regular rate and rhythm.  S1, S2.  No murmur.                      ABDOMEN:  Obese.   Soft, positive bowel sounds. Slightly tender LUQ nd LLQ.  No hepatosplenomegaly or masses.  No rebound or guarding.                                             EXTREMITIES:  No edema.     MENTAL STATUS:  Alert and oriented.    ASSESSMENT/PLAN:     Assessment: LUQ and LLQ abdo pain.  Bloating.  GERD.  Hematochezia.  Hx of colon polyps.    Plan: Gastroscopy and Colonoscopy    Anesthesia Plan:   MAC / General Anaesthesia    ASA Grade: ASA 2 - Patient with mild systemic disease with no functional limitations    MALLAMPATI SCORE: II (hard and soft palate, upper portion of tonsils anduvula visible)    "

## 2020-07-27 ENCOUNTER — ANESTHESIA (OUTPATIENT)
Dept: ENDOSCOPY | Facility: HOSPITAL | Age: 70
End: 2020-07-27
Payer: MEDICARE

## 2020-07-27 ENCOUNTER — HOSPITAL ENCOUNTER (OUTPATIENT)
Facility: HOSPITAL | Age: 70
Discharge: HOME | End: 2020-07-27
Attending: INTERNAL MEDICINE | Admitting: INTERNAL MEDICINE
Payer: MEDICARE

## 2020-07-27 ENCOUNTER — TELEPHONE (OUTPATIENT)
Dept: GASTROENTEROLOGY | Facility: CLINIC | Age: 70
End: 2020-07-27

## 2020-07-27 ENCOUNTER — NURSE TRIAGE (OUTPATIENT)
Dept: ADMINISTRATIVE | Facility: CLINIC | Age: 70
End: 2020-07-27

## 2020-07-27 VITALS
TEMPERATURE: 97 F | OXYGEN SATURATION: 97 % | WEIGHT: 175 LBS | BODY MASS INDEX: 35.28 KG/M2 | HEIGHT: 59 IN | RESPIRATION RATE: 12 BRPM | DIASTOLIC BLOOD PRESSURE: 72 MMHG | HEART RATE: 69 BPM | SYSTOLIC BLOOD PRESSURE: 164 MMHG

## 2020-07-27 DIAGNOSIS — R10.32 LLQ PAIN: ICD-10-CM

## 2020-07-27 LAB — H PYLORI INDEX VALUE: NEGATIVE

## 2020-07-27 PROCEDURE — 63600175 PHARM REV CODE 636 W HCPCS: Mod: PO | Performed by: ANESTHESIOLOGY

## 2020-07-27 PROCEDURE — 37000008 HC ANESTHESIA 1ST 15 MINUTES: Mod: PO | Performed by: INTERNAL MEDICINE

## 2020-07-27 PROCEDURE — 43239 PR EGD, FLEX, W/BIOPSY, SGL/MULTI: ICD-10-PCS | Mod: 51,,, | Performed by: INTERNAL MEDICINE

## 2020-07-27 PROCEDURE — D9220A PRA ANESTHESIA: ICD-10-PCS | Mod: CRNA,,, | Performed by: NURSE ANESTHETIST, CERTIFIED REGISTERED

## 2020-07-27 PROCEDURE — D9220A PRA ANESTHESIA: Mod: ANES,,, | Performed by: ANESTHESIOLOGY

## 2020-07-27 PROCEDURE — 37000009 HC ANESTHESIA EA ADD 15 MINS: Mod: PO | Performed by: INTERNAL MEDICINE

## 2020-07-27 PROCEDURE — 43239 EGD BIOPSY SINGLE/MULTIPLE: CPT | Mod: 51,,, | Performed by: INTERNAL MEDICINE

## 2020-07-27 PROCEDURE — 88305 TISSUE EXAM BY PATHOLOGIST: CPT | Mod: 26,,, | Performed by: PATHOLOGY

## 2020-07-27 PROCEDURE — 87449 NOS EACH ORGANISM AG IA: CPT | Mod: PO | Performed by: INTERNAL MEDICINE

## 2020-07-27 PROCEDURE — 45385 PR COLONOSCOPY,REMV LESN,SNARE: ICD-10-PCS | Mod: ,,, | Performed by: INTERNAL MEDICINE

## 2020-07-27 PROCEDURE — 25000003 PHARM REV CODE 250: Mod: PO | Performed by: NURSE ANESTHETIST, CERTIFIED REGISTERED

## 2020-07-27 PROCEDURE — 45380 COLONOSCOPY AND BIOPSY: CPT | Mod: PO | Performed by: INTERNAL MEDICINE

## 2020-07-27 PROCEDURE — 88305 TISSUE EXAM BY PATHOLOGIST: ICD-10-PCS | Mod: 26,,, | Performed by: PATHOLOGY

## 2020-07-27 PROCEDURE — 27201089 HC SNARE, DISP (ANY): Mod: PO | Performed by: INTERNAL MEDICINE

## 2020-07-27 PROCEDURE — D9220A PRA ANESTHESIA: Mod: CRNA,,, | Performed by: NURSE ANESTHETIST, CERTIFIED REGISTERED

## 2020-07-27 PROCEDURE — 45385 COLONOSCOPY W/LESION REMOVAL: CPT | Mod: PO | Performed by: INTERNAL MEDICINE

## 2020-07-27 PROCEDURE — 27201012 HC FORCEPS, HOT/COLD, DISP: Mod: PO | Performed by: INTERNAL MEDICINE

## 2020-07-27 PROCEDURE — 63600175 PHARM REV CODE 636 W HCPCS: Mod: PO | Performed by: NURSE ANESTHETIST, CERTIFIED REGISTERED

## 2020-07-27 PROCEDURE — 63600175 PHARM REV CODE 636 W HCPCS: Mod: PO | Performed by: INTERNAL MEDICINE

## 2020-07-27 PROCEDURE — 88305 TISSUE EXAM BY PATHOLOGIST: CPT | Mod: 59 | Performed by: PATHOLOGY

## 2020-07-27 PROCEDURE — 45385 COLONOSCOPY W/LESION REMOVAL: CPT | Mod: ,,, | Performed by: INTERNAL MEDICINE

## 2020-07-27 PROCEDURE — D9220A PRA ANESTHESIA: ICD-10-PCS | Mod: ANES,,, | Performed by: ANESTHESIOLOGY

## 2020-07-27 PROCEDURE — 43239 EGD BIOPSY SINGLE/MULTIPLE: CPT | Mod: PO | Performed by: INTERNAL MEDICINE

## 2020-07-27 RX ORDER — FENTANYL CITRATE 50 UG/ML
INJECTION, SOLUTION INTRAMUSCULAR; INTRAVENOUS
Status: DISCONTINUED | OUTPATIENT
Start: 2020-07-27 | End: 2020-07-27

## 2020-07-27 RX ORDER — DICYCLOMINE HYDROCHLORIDE 10 MG/1
10 CAPSULE ORAL
Qty: 120 CAPSULE | Refills: 11 | Status: SHIPPED | OUTPATIENT
Start: 2020-07-27 | End: 2021-07-27

## 2020-07-27 RX ORDER — SODIUM CHLORIDE 0.9 % (FLUSH) 0.9 %
10 SYRINGE (ML) INJECTION
Status: DISCONTINUED | OUTPATIENT
Start: 2020-07-27 | End: 2020-07-27 | Stop reason: HOSPADM

## 2020-07-27 RX ORDER — SODIUM CHLORIDE, SODIUM LACTATE, POTASSIUM CHLORIDE, CALCIUM CHLORIDE 600; 310; 30; 20 MG/100ML; MG/100ML; MG/100ML; MG/100ML
INJECTION, SOLUTION INTRAVENOUS CONTINUOUS
Status: DISCONTINUED | OUTPATIENT
Start: 2020-07-27 | End: 2020-07-27 | Stop reason: HOSPADM

## 2020-07-27 RX ORDER — LIDOCAINE HCL/PF 100 MG/5ML
SYRINGE (ML) INTRAVENOUS
Status: DISCONTINUED | OUTPATIENT
Start: 2020-07-27 | End: 2020-07-27

## 2020-07-27 RX ORDER — PROPOFOL 10 MG/ML
VIAL (ML) INTRAVENOUS CONTINUOUS PRN
Status: DISCONTINUED | OUTPATIENT
Start: 2020-07-27 | End: 2020-07-27

## 2020-07-27 RX ORDER — PROPOFOL 10 MG/ML
VIAL (ML) INTRAVENOUS
Status: DISCONTINUED | OUTPATIENT
Start: 2020-07-27 | End: 2020-07-27

## 2020-07-27 RX ORDER — GLYCOPYRROLATE 0.2 MG/ML
INJECTION INTRAMUSCULAR; INTRAVENOUS
Status: DISCONTINUED | OUTPATIENT
Start: 2020-07-27 | End: 2020-07-27

## 2020-07-27 RX ADMIN — SODIUM CHLORIDE, SODIUM LACTATE, POTASSIUM CHLORIDE, AND CALCIUM CHLORIDE: .6; .31; .03; .02 INJECTION, SOLUTION INTRAVENOUS at 11:07

## 2020-07-27 RX ADMIN — LIDOCAINE HYDROCHLORIDE 100 MG: 20 INJECTION, SOLUTION INTRAVENOUS at 11:07

## 2020-07-27 RX ADMIN — FENTANYL CITRATE 25 MCG: 50 INJECTION, SOLUTION INTRAMUSCULAR; INTRAVENOUS at 11:07

## 2020-07-27 RX ADMIN — PROPOFOL 150 MCG/KG/MIN: 10 INJECTION, EMULSION INTRAVENOUS at 11:07

## 2020-07-27 RX ADMIN — PROPOFOL 100 MG: 10 INJECTION, EMULSION INTRAVENOUS at 11:07

## 2020-07-27 RX ADMIN — GLYCOPYRROLATE 0.2 MG: 0.2 INJECTION, SOLUTION INTRAMUSCULAR; INTRAVENOUS at 11:07

## 2020-07-27 NOTE — [employer] PATIENT INSTRUCTIONS
Discharge Summary/Instructions for after Colonoscopy with   Biopsy/Polypectomy  Herminia Wooten    Monday, July 27, 2020  Avel Luna MD  RESTRICTIONS ON ACTIVITY:  - Do not drive a car or operate machinery until the day after the procedure.      - The following day: return to full activity including work.  - For  3 days: No heavy lifting, straining or running.  - Diet: You can have solid foods, but no gassy foods (i.e. beans, broccoli,   cabbage, etc).  TREATMENT FOR COMMON SIDE EFFECTS:  - Mild abdominal pain and bloating or excessive gas: rest, eat lightly and   use a heating pad.  SYMPTOMS TO WATCH FOR AND REPORT TO YOUR PHYSICIAN:  1. Severe abdominal pain.  2. Fever within 24 hours after a procedure.  3. A large amount of rectal bleeding. (A small amount of blood from the   rectum is not serious, especially if hemorrhoids are present.  3.  Because air was put into your colon during the procedure, expelling   large amounts of air from your rectum is normal.  4.  You may not have a bowel movement for 1-3 days because of the   colonoscopy prep.  This is normal.  5.  Call immediately if you notice any of the following:   Chills and/or fever over 101   Persistent vomiting   Severe abdominal pain, other than gas cramps   Severe chest pain   Black, tarry stools   Any bleeding - exceeding one tablespoon  Your doctor recommends these additional instructions:  We are waiting for your pathology results.   Your physician has recommended a repeat colonoscopy in five years for   surveillance.   Eat a high fiber diet.   Take a fiber supplement, for example Citrucel, Fibercon, Konsyl or   Metamucil.   Take a PROBIOTIC, such as ALIGN or CULTURELLE or NBA-Q (all   non-prescription), every day for a month.   And repeat this 5-6 times a   year.  Continue your present medications.   Take Bentyl (dicyclomine) 10 mg by mouth four times per day 30 minutes   before meals.   Return to GI clinic in 4-6 weeks.  None  If you  have any questions or problems, please call your physician.  EMERGENCY PHONE NUMBER: [phone]  LAB RESULTS: Call in two (2) weeks for lab results, [phone]  ___________________________________________  Nurse Signature  ___________________________________________  Patient/Designated Responsible Party Signature  Avel Luna MD  7/27/2020 1:04:12 PM  This report has been verified and signed electronically.  [employer]

## 2020-07-27 NOTE — ANESTHESIA PREPROCEDURE EVALUATION
07/27/2020  Herminia Wooten is a 69 y.o., female.    Pre-op Assessment    I have reviewed the Patient Summary Reports.     I have reviewed the Nursing Notes.       Review of Systems  Anesthesia Hx:  No problems with previous Anesthesia    Cardiovascular:   Hypertension, well controlled CAD asymptomatic CABG/stent  CAD - stents X 2 in 2011 - stable   Hepatic/GI:   GERD, well controlled Liver Disease, Fatty liver       Physical Exam  General:  Obesity                 Anesthesia Plan  Type of Anesthesia, risks & benefits discussed:  Anesthesia Type:  general  Patient's Preference:   Intra-op Monitoring Plan:   Intra-op Monitoring Plan Comments:   Post Op Pain Control Plan:   Post Op Pain Control Plan Comments:   Induction:   IV  Beta Blocker:  Patient is not currently on a Beta-Blocker (No further documentation required).       Informed Consent: Patient understands risks and agrees with Anesthesia plan.  Questions answered. Anesthesia consent signed with patient.  ASA Score: 3     Day of Surgery Review of History & Physical:    H&P update referred to the surgeon.         Ready For Surgery From Anesthesia Perspective.

## 2020-07-27 NOTE — TELEPHONE ENCOUNTER
Pt states she had a bowel movement and she noticed blood in in, she states it was more than 1 tablespoon, as per discharge instructions, advised her to go to the ER for assessment, caller agreed      Reason for Disposition   [1] Colonoscopy AND [2] in past 72 hours    Protocols used: RECTAL BLEEDING-A-AH

## 2020-07-27 NOTE — DISCHARGE INSTRUCTIONS
Recovery After Procedural Sedation (Adult)   You have been given medicine by vein to make you sleep during your surgery. This may have included both a pain medicine and sleeping medicine. Most of the effects have worn off. But you may still have some drowsiness for the next 6 to 8 hours.  Home care  Follow these guidelines when you get home:  · For the next 8 hours, you should be watched by a responsible adult. This person should make sure your condition is not getting worse.  · Don't drink any alcohol for the next 24 hours.  · Don't drive, operate dangerous machinery, or make important business or personal decisions during the next 24 hours.  · To prevent injury or falls, use caution when standing and walking for at least 24 hours after your procedure.  Note: Your healthcare provider may tell you not to take any medicine by mouth for pain or sleep in the next 4 hours. These medicines may react with the medicines you were given in the hospital. This could cause a much stronger response than usual.  Follow-up care  Follow up with your healthcare provider if you are not alert and back to your usual level of activity within 12 hours.  When to seek medical advice  Call your healthcare provider right away if any of these occur:  · Drowsiness gets worse  · Weakness or dizziness gets worse  · Repeated vomiting  · You can't be awakened  · Fever  · New rash  [employer] last reviewed this educational content on 9/1/2019  © 0211-9808 [employer]. [address] [place] [address]. All rights reserved. This information is not intended as a substitute for professional medical care. Always follow your healthcare professional's instructions.        PROBIOTICS:  Now that your colon is so cleaned out, now is a good time for a round of PROBIOTICS.  Take a  Probiotic product such as Align or Culturelle or Naomie-Q, every day for a month.                  (The products listed are non-prescription, but you may need  to ask the pharmacist for their location.)  Repeat this at least 5-6 times a year.          Tips to Control Acid Reflux    To control acid reflux, youll need to make some basic diet and lifestyle changes. The simple steps outlined below may be all youll need to ease discomfort.  Watch what you eat  · Avoid fatty foods and spicy foods.  · Eat fewer acidic foods, such as citrus and tomato-based foods. These can increase symptoms.  · Limit drinking alcohol, caffeine, and fizzy beverages. All increase acid reflux.  · Try limiting chocolate, peppermint, and spearmint. These can worsen acid reflux in some people.  Watch when you eat  · Avoid lying down for 3 hours after eating.  · Do not snack before going to bed.  Raise your head  Raising your head and upper body by 4 to 6 inches helps limit reflux when youre lying down. Put blocks under the head of your bed frame to raise it.  Other changes  · Lose weight, if you need to  · Dont exercise near bedtime  · Avoid tight-fitting clothes  · Limit aspirin and ibuprofen  · Stop smoking   Date Last Reviewed: 7/1/2016 © 2000-2017 [employer]. [address] [place] [address]. All rights reserved. This information is not intended as a substitute for professional medical care. Always follow your healthcare professional's instructions.        High-Fiber Diet  Fiber is in fruits, vegetables, cereals, and grains. Fiber passes through your body undigested. A high-fiber diet helps food move through your intestinal tract. The added bulk is helpful in preventing constipation. In people with diverticulosis, fiber helps clean out the pouches along the colon wall. It also prevents new pouches from forming. A high-fiber diet reduces the risk of colon cancer. It also lowers blood cholesterol and prevents high blood sugar in people with diabetes.    The fiber-rich foods listed below should be part of your diet. If you are not used to high-fiber foods, start with 1 or  2 foods from this list. Every 3 to 4 days add a new one to your diet. Do this until you are eating 4 high-fiber foods per day. This should give you 20 to 35 grams of fiber a day. It is also important to drink a lot of water when you are on this diet. You should have 6 to 8 glasses of water a day. Water makes the fiber swell and increases the benefit.  Foods high in dietary fiber  The following foods are high in dietary fiber:  · Breads. Breads made with 100% whole-wheat flour; christin, wheat, or rye crackers; whole-grain tortillas, bran muffins.  · Cereals. Whole-grain and bran cereals with bran (shredded wheat, wheat flakes, raisin bran, corn bran); oatmeal, rolled oats, granola, and brown rice.  · Fruits. Fresh fruits and their edible skins (pears, prunes, raisins, berries, apples, and apricots); bananas, citrus fruit, mangoes, pineapple; and prune juice.  · Nuts. Any nuts and seeds.  · Vegetables. Best served raw or lightly cooked. All types, especially: green peas, celery, eggplant, potatoes, spinach, broccoli, [place] sprouts, winter squash, carrots, cauliflower, soybeans, lentils, and fresh and dried beans of all kinds.  · Other. Popcorn, any spices.  Date Last Reviewed: 8/1/2016  © 4203-6087 [employer]. [address] [place] [address], [place] [address]. All rights reserved. This information is not intended as a substitute for professional medical care. Always follow your healthcare professional's instructions.

## 2020-07-27 NOTE — BRIEF OP NOTE
Discharge Note  Short Stay      SUMMARY     Admit Date: 7/27/2020    Attending Physician: Avel Luna Jr., MD     Discharge Physician: Avel Luna Jr., MD    Discharge Date: 7/27/2020 1:07 PM    Final Diagnosis: Abdominal pain, unspecified abdominal location [R10.9]  Gastroesophageal reflux disease, esophagitis presence not specified [K21.9]  Bloating [R14.0]  Constipation, unspecified constipation type [K59.00]  History of colon polyps [Z86.010]  Hematochezia [K92.1]    Impression:          - Normal oropharynx.                        - Normal esophagus.                        - Z-line regular, 35 cm from the incisors.                        - Normal cardia (no hiatal hernia).                        - Non-erosive esophageal reflux (NERD) disease(?).                        - Normal gastric fundus. Biopsied.                        - Normal gastric body, antrum and prepyloric region                        of the stomach. Biopsied.                        - Normal pylorus.                        - Normal examined duodenum. Biopsied.                        - Normal major papilla.   Recommendation:      - Perform a colonoscopy as previously scheduled.                        - Discharge patient to home after that.                        - Await pathology and CLOtest results.                        - Follow an antireflux regimen.                        - Continue present medications.                        - Use Prilosec (omeprazole) 40 mg PO daily.                        - Use Bentyl (dicyclomine) 10 mg PO QID; 30 min AC                        and HS.                        - Call the G.I. clinic in 2 weeks for reports (if                        you haven't heard from us sooner) [phone].     Impression:          - Two 3 mm polyps in the proximal ascending colon,                        removed with a cold snare. Resected and retrieved.                        - Diverticulosis in the sigmoid colon.                         - Diverticulosis in the descending colon, in the                        transverse colon and in the ascending colon.                        - Irritable bowel syndrome(?).                        - Non-bleeding internal hemorrhoids.                        - The examination was otherwise normal.                        - The examined portion of the ileum was normal.   Recommendation:      - Discharge patient to home.                        - Await pathology results.                        - Repeat colonoscopy in 5 years for surveillance.                        - High fiber diet.                        - Use fiber, for example Citrucel, Fibercon, Konsyl                        or Metamucil.                        - Take a PROBIOTIC, such as ALIGN or CULTURELLE or                        NBA-Q (all non-prescription), every day for a                        month.                        - Continue present medications.                        - Use Bentyl (dicyclomine) 10 mg PO QID 30 min AC.                        - Call the G.I. clinic in 2 weeks for reports (if                        you haven't heard from us sooner) [phone].                        - Return to normal activities tomorrow.                          - Return to GI clinic in 4-6 weeks.   Avel Luna MD   7/27/2020     Disposition: HOME OR SELF CARE    Patient Instructions:   Current Discharge Medication List      START taking these medications    Details   dicyclomine (BENTYL) 10 MG capsule Take 1 capsule (10 mg total) by mouth 4 (four) times daily before meals and nightly. , to ease bloating and gas pains.  Qty: 120 capsule, Refills: 11         CONTINUE these medications which have NOT CHANGED    Details   aluminum & magnesium hydroxide-simethicone (MYLANTA MAX STRENGTH) 400-400-40 mg/5 mL suspension Take by mouth every 6 (six) hours as needed for Indigestion.      atenolol (TENORMIN) 25 MG tablet Take 25 mg by mouth every evening.        atorvastatin (LIPITOR) 20 MG tablet Take 20 mg by mouth once daily.      cholecalciferol, vitamin D3, (VITAMIN D3) 2,000 unit Tab Take 1 tablet by mouth once daily.       estradioL (ESTRACE) 0.01 % (0.1 mg/gram) vaginal cream PLACE 1 GRAM VAGINALLY EVERY MONDAY AND THURSDAY  Qty: 42.5 g, Refills: 1      FLOVENT  mcg/actuation inhaler INHALE 1 PUFF PO Q 12 H  Refills: 4      fluticasone (FLONASE) 50 mcg/actuation nasal spray 1 spray by Each Nare route 2 (two) times daily as needed.       levothyroxine (SYNTHROID) 25 MCG tablet Take 25 mcg by mouth once daily.       lorazepam (ATIVAN) 0.5 MG tablet Take 0.5 mg by mouth every 12 (twelve) hours as needed.       losartan (COZAAR) 50 MG tablet Take 50 mg by mouth once daily.       omeprazole (PRILOSEC) 40 MG capsule Take 1 capsule (40 mg total) by mouth once daily.  Qty: 90 capsule, Refills: 2    Associated Diagnoses: Nausea; History of gastroesophageal reflux (GERD)      polyethylene glycol (GLYCOLAX) 17 gram PwPk Take 17 g by mouth daily as needed.       augmented betamethasone (DIPROLENE) 0.05 % gel FCO THIN FILM AA 4 TO 6 TIMES D. DO NOT USE FOR LONGER THAN 14 DAYS  Refills: 1      betamethasone valerate 0.1% (VALISONE) 0.1 % Crea Apply 1 application topically daily as needed.       bisacodyl 5 mg Tab Take 5 mg by mouth once daily.       clobetasol (TEMOVATE) 0.05 % cream APPLY 1/2 GRAM TOPICALLY twice weekly  Qty: 30 g, Refills: 1    Comments: Hold to fill  Associated Diagnoses: Lichen sclerosus et atrophicus; Allergic contact dermatitis, unspecified trigger      fluconazole (DIFLUCAN) 150 MG Tab TK 1 T PO D FOR 2 DAYS  Refills: 1      mometasone 220 mcg (30 doses) inhaler Inhale 2 puffs into the lungs daily as needed.              Discharge Procedure Orders (must include Diet, Follow-up, Activity)    Follow Up:  Follow up with PCP as per your routine.  Please follow an anti reflux diet and a high fiber diet.  Activity as tolerated.    No driving day of  procedure.    PROBIOTICS:  Now that your colon is so cleaned out, now is a good time for a round of PROBIOTICS.  Take a  Probiotic product such as Align or Culturelle or Naomie-Q, every day for a month.                  (The products listed are non-prescription, but you may need to ask the pharmacist for their location.)  Repeat this at least 5-6 times a year.

## 2020-07-27 NOTE — TRANSFER OF CARE
"Anesthesia Transfer of Care Note    Patient: Herminia Wooten    Procedure(s) Performed: Procedure(s) (LRB):  EGD (ESOPHAGOGASTRODUODENOSCOPY) (N/A)  COLONOSCOPY (N/A)    Patient location: PACU    Anesthesia Type: general    Transport from OR: Transported from OR on room air with adequate spontaneous ventilation    Post pain: adequate analgesia    Post assessment: no apparent anesthetic complications and tolerated procedure well    Post vital signs: stable    Level of consciousness: awake and sedated    Nausea/Vomiting: no nausea/vomiting    Complications: none    Transfer of care protocol was followed      Last vitals:   Visit Vitals  BP (!) 204/88 (BP Location: Left arm, Patient Position: Lying)   Pulse 80   Temp 36.3 °C (97.3 °F) (Skin)   Resp 18   Ht 4' 11" (1.499 m)   Wt 79.4 kg (175 lb)   LMP 10/07/1976   SpO2 96%   Breastfeeding No   BMI 35.35 kg/m²     "

## 2020-07-27 NOTE — [employer] PATIENT INSTRUCTIONS
Discharge Summary/Instructions after an Endoscopic Procedure  Patient Name: Herminia Wooten  Patient MRN: [id number]  Patient YOB: 1950 Monday, July 27, 2020  Avel Luna MD  RESTRICTIONS:  During your procedure today, you received medications for sedation.  These   medications may affect your judgment, balance and coordination.  Therefore,   for 24 hours, you have the following restrictions:   - DO NOT drive a car, operate machinery, make legal/financial decisions,   sign important papers or drink alcohol.    ACTIVITY:  Today: no heavy lifting, straining or running due to procedural   sedation/anesthesia.  The following day: return to full activity including work.  DIET:  Eat and drink normally unless instructed otherwise.     TREATMENT FOR COMMON SIDE EFFECTS:  - Mild abdominal pain, nausea, belching, bloating or excessive gas:  rest,   eat lightly and use a heating pad.  - Sore Throat: treat with throat lozenges and/or gargle with warm salt   water.  - Because air was used during the procedure, expelling large amounts of air   from your rectum or belching is normal.  - If a bowel prep was taken, you may not have a bowel movement for 1-3 days.    This is normal.  SYMPTOMS TO WATCH FOR AND REPORT TO YOUR PHYSICIAN:  1. Abdominal pain or bloating, other than gas cramps.  2. Chest pain.  3. Back pain.  4. Signs of infection such as: chills or fever occurring within 24 hours   after the procedure.  5. Rectal bleeding, which would show as bright red, maroon, or black stools.   (A tablespoon of blood from the rectum is not serious, especially if   hemorrhoids are present.)  6. Vomiting.  7. Weakness or dizziness.  GO DIRECTLY TO THE NEAREST EMERGENCY ROOM IF YOU HAVE ANY OF THE FOLLOWING:      Difficulty breathing              Chills and/or fever over 101 F   Persistent vomiting and/or vomiting blood   Severe abdominal pain   Severe chest pain   Black, tarry stools   Bleeding- more than one  tablespoon   Any other symptom or condition that you feel may need urgent attention  Your doctor recommends these additional instructions:  If any biopsies were taken, your doctors clinic will contact you in 1 to 2   weeks with any results.  Follow an antireflux regimen.  This includes:       - Do not lie down for at least 3 to 4 hours after meals.        - Raise the head of the bed 4 to 6 inches.        - Decrease excess weight.        - Avoid citrus juices and other acidic foods, alcohol, chocolate, mints,   coffee and other caffeinated beverages, carbonated beverages, fatty and   fried foods.        - Avoid tight-fitting clothing.        - Avoid cigarettes and other tobacco products.   Continue your present medications.   Take Prilosec (omeprazole) 40 mg by mouth once a day.   Take Bentyl (dicyclomine)) 10 mg by mouth four times a day, 20-30 minutes   before meals and at bedtime, to ease bloating.   Return to GI clinic in 4-6 weeks.  For questions, problems or results please call your physician - Avel Luna MD at Work:  [phone].  EMERGENCY PHONE NUMBER: [phone], LAB RESULTS: [phone]  IF A COMPLICATION OR EMERGENCY SITUATION ARISES AND YOU ARE UNABLE TO REACH   YOUR PHYSICIAN - GO DIRECTLY TO THE EMERGENCY ROOM.  ___________________________________________  Nurse Signature  ___________________________________________  Patient/Designated Responsible Party Signature  Avel Luna MD  7/27/2020 12:46:21 PM  This report has been verified and signed electronically.  [employer]

## 2020-07-28 NOTE — ANESTHESIA POSTPROCEDURE EVALUATION
Anesthesia Post Evaluation    Patient: Herminia Wooten    Procedure(s) Performed: Procedure(s) (LRB):  EGD (ESOPHAGOGASTRODUODENOSCOPY) (N/A)  COLONOSCOPY (N/A)    Final Anesthesia Type: general    Patient location during evaluation: PACU  Patient participation: Yes- Able to Participate  Level of consciousness: awake and alert, oriented and awake  Post-procedure vital signs: reviewed and stable  Pain management: adequate  Airway patency: patent    PONV status at discharge: No PONV  Anesthetic complications: no      Cardiovascular status: blood pressure returned to baseline and hemodynamically stable  Respiratory status: unassisted, spontaneous ventilation and room air  Hydration status: euvolemic  Follow-up not needed.          Vitals Value Taken Time   /72 07/27/20 1245   Temp  07/28/20 1038   Pulse 69 07/27/20 1245   Resp 12 07/27/20 1245   SpO2 97 % 07/27/20 1245         Event Time   Out of Recovery 13:11:46         Pain/Marquis Score: Marquis Score: 10 (7/27/2020 12:51 PM)

## 2020-07-29 LAB
FINAL PATHOLOGIC DIAGNOSIS: NORMAL
GROSS: NORMAL

## 2020-07-31 ENCOUNTER — LAB VISIT (OUTPATIENT)
Dept: LAB | Facility: HOSPITAL | Age: 70
End: 2020-07-31
Attending: OTOLARYNGOLOGY
Payer: MEDICARE

## 2020-07-31 DIAGNOSIS — J30.89 NON-SEASONAL ALLERGIC RHINITIS: ICD-10-CM

## 2020-07-31 DIAGNOSIS — J31.0 CHRONIC RHINITIS: Primary | ICD-10-CM

## 2020-07-31 LAB — IGE SERPL-ACNC: <35 IU/ML (ref 0–100)

## 2020-07-31 PROCEDURE — 86003 ALLG SPEC IGE CRUDE XTRC EA: CPT | Mod: 59

## 2020-07-31 PROCEDURE — 86003 ALLG SPEC IGE CRUDE XTRC EA: CPT

## 2020-07-31 PROCEDURE — 82785 ASSAY OF IGE: CPT

## 2020-07-31 PROCEDURE — 36415 COLL VENOUS BLD VENIPUNCTURE: CPT | Mod: PO

## 2020-08-03 LAB
A ALTERNATA IGE QN: <0.1 KU/L
A FUMIGATUS IGE QN: <0.1 KU/L
ALLERGEN BOXELDER MAPLE TREE IGE: <0.1 KU/L
ALLERGEN MAPLE (BOX ELDER) CLASS: NORMAL
ALLERGEN MULBERRY CLASS: NORMAL
ALLERGEN MULBERRY TREE IGE: <0.1 KU/L
ALLERGEN PIGWEED IGE: <0.1 KU/L
ALLERGEN WALNUT TREE IGE: <0.1 KU/L
BERMUDA GRASS IGE QN: <0.1 KU/L
C HERBARUM IGE QN: <0.1 KU/L
CAT DANDER IGE QN: <0.1 KU/L
CEDAR IGE QN: <0.1 KU/L
COMMON PIGWEED CLASS: NORMAL
COMMON RAGWEED IGE QN: <0.1 KU/L
D FARINAE IGE QN: <0.1 KU/L
D PTERONYSS IGE QN: <0.1 KU/L
DEPRECATED A ALTERNATA IGE RAST QL: NORMAL
DEPRECATED A FUMIGATUS IGE RAST QL: NORMAL
DEPRECATED BERMUDA GRASS IGE RAST QL: NORMAL
DEPRECATED C HERBARUM IGE RAST QL: NORMAL
DEPRECATED CAT DANDER IGE RAST QL: NORMAL
DEPRECATED CEDAR IGE RAST QL: NORMAL
DEPRECATED COMMON RAGWEED IGE RAST QL: NORMAL
DEPRECATED D FARINAE IGE RAST QL: NORMAL
DEPRECATED D PTERONYSS IGE RAST QL: NORMAL
DEPRECATED DOG DANDER IGE RAST QL: NORMAL
DEPRECATED ELDER IGE RAST QL: NORMAL
DEPRECATED PECAN/HICK TREE IGE RAST QL: NORMAL
DEPRECATED ROACH IGE RAST QL: NORMAL
DEPRECATED SILVER BIRCH IGE RAST QL: NORMAL
DEPRECATED TIMOTHY IGE RAST QL: NORMAL
DEPRECATED WHITE OAK IGE RAST QL: NORMAL
DOG DANDER IGE QN: <0.1 KU/L
ELDER IGE QN: <0.1 KU/L
ELM CEDAR CLASS: NORMAL
ELM CEDAR, IGE: <0.1 KU/L
PECAN/HICK TREE IGE QN: <0.1 KU/L
ROACH IGE QN: <0.1 KU/L
SILVER BIRCH IGE QN: <0.1 KU/L
TIMOTHY IGE QN: <0.1 KU/L
WALNUT TREE CLASS: NORMAL
WHITE OAK IGE QN: <0.1 KU/L

## 2020-08-05 LAB
ALLERGEN NAME: NORMAL
ALLERGEN RESULT: NORMAL

## 2020-09-11 ENCOUNTER — TELEPHONE (OUTPATIENT)
Dept: OBSTETRICS AND GYNECOLOGY | Facility: CLINIC | Age: 70
End: 2020-09-11

## 2020-09-11 NOTE — TELEPHONE ENCOUNTER
Mammogram due , annual after 2/2021. Left message to call office   ----- Message from Darshana Bermeo sent at 9/11/2020  2:35 PM CDT -----  Contact: Self   Pt would like to confirm if it is time for another OBGYN appt and mammo before scheduling. Please advise

## 2020-09-14 ENCOUNTER — TELEPHONE (OUTPATIENT)
Dept: GASTROENTEROLOGY | Facility: CLINIC | Age: 70
End: 2020-09-14

## 2020-09-14 NOTE — TELEPHONE ENCOUNTER
Informed pt that the surgery center will leave a message with anesthesia letting them know to give her a call. Also informed her that she should call to request medical records in order to get records from her procedure. Pt verbalized understanding.

## 2020-09-14 NOTE — TELEPHONE ENCOUNTER
----- Message from Luciana Steel sent at 9/14/2020 11:53 AM CDT -----  Regarding: advice  Contact: pt  Type: Needs Medical Advice  Who Called:  pt   Symptoms (please be specific): issues after her Procedure 7/27/20   How long has patient had these symptoms:  headache and blood pressure pt state she still has soreness in her arm that IV was placed.   Pharmacy name and phone #:   Best Call Back Number: [phone]  Additional Information: Patient states this is the first time she has called about these symptoms since her Procedure in July.

## 2020-09-15 ENCOUNTER — TELEPHONE (OUTPATIENT)
Dept: OBSTETRICS AND GYNECOLOGY | Facility: CLINIC | Age: 70
End: 2020-09-15

## 2020-09-15 DIAGNOSIS — Z12.31 OTHER SCREENING MAMMOGRAM: Primary | ICD-10-CM

## 2020-09-15 NOTE — TELEPHONE ENCOUNTER
Left message order is in for mammogram      ----- Message from Susy Boyer sent at 9/15/2020  1:54 PM CDT -----  Regarding: return call to Shelly  Contact: Herminia pt  Type:  Patient Returning Call    Who Called:  Herminia  Who Left Message for Patient:  Shelly  Does the patient know what this is regarding?:  mammo  Best Call Back Number:  [phone]  Additional Information:  Pls call pt to adv

## 2020-10-08 ENCOUNTER — TELEPHONE (OUTPATIENT)
Dept: OPTOMETRY | Facility: CLINIC | Age: 70
End: 2020-10-08

## 2020-10-08 ENCOUNTER — TELEPHONE (OUTPATIENT)
Dept: GASTROENTEROLOGY | Facility: CLINIC | Age: 70
End: 2020-10-08

## 2020-10-08 NOTE — TELEPHONE ENCOUNTER
----- Message from Becki Cheng LPN sent at 10/8/2020  1:02 PM CDT -----  Regarding: FW: Ear crystal placed in right ear  Contact: pt    ----- Message -----  From: Kate Roberto  Sent: 10/8/2020  10:56 AM CDT  To: Jeremy PONCE Jr. Staff  Subject: Ear crystal placed in right ear                  Type: Needs Medical Advice  Who Called:  pt  Symptoms (please be specific):  vertigo  How long has patient had these symptoms:  since July  Pharmacy name and phone #:    [facility] #[id number] - [place] [address] AT [facility] [address] [address]  Phone: [phone] Fax: [phone]  Best Call Back Number: [phone] (home)   Additional Information: Pt states she needs her crystal placed or adjusted in her rt ear. Pt states Dr Mackey advised pt of it's placement. Thanks!

## 2020-10-08 NOTE — TELEPHONE ENCOUNTER
----- Message from Kate Blow sent at 10/8/2020 10:56 AM CDT -----  Regarding: Ear crystal placed in right ear  Contact: pt  Type: Needs Medical Advice  Who Called:  pt  Symptoms (please be specific):  vertigo  How long has patient had these symptoms:  since July  Pharmacy name and phone #:    [facility] #[id number] - [place] [id number] [place] [address] AT [facility] [address] [place] [id number]  [address] [address] [address]  Phone: [phone] Fax: [phone]  Best Call Back Number: [phone] (home)   Additional Information: Pt states she needs her crystal placed or adjusted in her rt ear. Pt states Dr Mackey advised pt of it's placement. Thanks!

## 2020-10-08 NOTE — TELEPHONE ENCOUNTER
Informed pt that she needed to be seen by ENT not gastroenterologist. Pt verbalized understanding.

## 2020-10-16 ENCOUNTER — HOSPITAL ENCOUNTER (OUTPATIENT)
Dept: RADIOLOGY | Facility: HOSPITAL | Age: 70
Discharge: HOME | End: 2020-10-16
Attending: OBSTETRICS & GYNECOLOGY
Payer: MEDICARE

## 2020-10-16 DIAGNOSIS — Z12.31 SCREENING MAMMOGRAM, ENCOUNTER FOR: ICD-10-CM

## 2020-10-16 PROCEDURE — 77067 MAMMO DIGITAL SCREENING BILAT WITH TOMO: ICD-10-PCS | Mod: 26,,, | Performed by: RADIOLOGY

## 2020-10-16 PROCEDURE — 77067 SCR MAMMO BI INCL CAD: CPT | Mod: TC,PO

## 2020-10-16 PROCEDURE — 77063 MAMMO DIGITAL SCREENING BILAT WITH TOMO: ICD-10-PCS | Mod: 26,,, | Performed by: RADIOLOGY

## 2020-10-16 PROCEDURE — 77067 SCR MAMMO BI INCL CAD: CPT | Mod: 26,,, | Performed by: RADIOLOGY

## 2020-10-16 PROCEDURE — 77063 BREAST TOMOSYNTHESIS BI: CPT | Mod: 26,,, | Performed by: RADIOLOGY

## 2020-10-20 ENCOUNTER — TELEPHONE (OUTPATIENT)
Dept: GASTROENTEROLOGY | Facility: CLINIC | Age: 70
End: 2020-10-20

## 2020-10-20 NOTE — TELEPHONE ENCOUNTER
----- Message from Shweta Ortega sent at 10/20/2020 12:25 PM CDT -----  Type:  Sooner Apoointment Request    Caller is requesting a sooner appointment.  Caller declined first available appointment listed below.  Caller will not accept being placed on the waitlist and is requesting a message be sent to doctor.    Name of Caller:  Patient   When is the first available appointment?  12/15  Symptoms:  Hospital follow up/Diverticulitis  Best Call Back Number:  [phone]  Additional Information:  Please advise-thank you

## 2020-10-22 ENCOUNTER — TELEPHONE (OUTPATIENT)
Dept: OTOLARYNGOLOGY | Facility: CLINIC | Age: 70
End: 2020-10-22

## 2020-10-22 NOTE — TELEPHONE ENCOUNTER
----- Message from Suzie Geronimo sent at 10/22/2020  2:44 PM CDT -----  Regarding: Pt appointment  Type:  Patient Returning Call    Who Called:  pt  Symptoms- vertigo  Best Call Back Number:  [phone]  Additional Information:  Pt is calling to schedule an appointment due to vertigo. Please call to schedule.

## 2020-10-22 NOTE — TELEPHONE ENCOUNTER
sean pt and scheduled first available appt with Meredith Gonsalves, also added pt to waitlist. Pt confirmed date and time of appt.

## 2020-10-29 ENCOUNTER — OFFICE VISIT (OUTPATIENT)
Dept: OTOLARYNGOLOGY | Facility: CLINIC | Age: 70
End: 2020-10-29
Payer: MEDICARE

## 2020-10-29 VITALS
BODY MASS INDEX: 35.56 KG/M2 | WEIGHT: 176.38 LBS | DIASTOLIC BLOOD PRESSURE: 56 MMHG | SYSTOLIC BLOOD PRESSURE: 142 MMHG | HEIGHT: 59 IN

## 2020-10-29 DIAGNOSIS — H81.10 BENIGN PAROXYSMAL POSITIONAL VERTIGO, UNSPECIFIED LATERALITY: Primary | ICD-10-CM

## 2020-10-29 DIAGNOSIS — H81.8X2 LEFT-SIDED VESTIBULAR WEAKNESS: ICD-10-CM

## 2020-10-29 PROCEDURE — 3008F PR BODY MASS INDEX (BMI) DOCUMENTED: ICD-10-PCS | Mod: CPTII,S$GLB,, | Performed by: NURSE PRACTITIONER

## 2020-10-29 PROCEDURE — 99999 PR PBB SHADOW E&M-EST. PATIENT-LVL V: ICD-10-PCS | Mod: PBBFAC,,, | Performed by: NURSE PRACTITIONER

## 2020-10-29 PROCEDURE — 1101F PR PT FALLS ASSESS DOC 0-1 FALLS W/OUT INJ PAST YR: ICD-10-PCS | Mod: CPTII,S$GLB,, | Performed by: NURSE PRACTITIONER

## 2020-10-29 PROCEDURE — 3077F PR MOST RECENT SYSTOLIC BLOOD PRESSURE >= 140 MM HG: ICD-10-PCS | Mod: CPTII,S$GLB,, | Performed by: NURSE PRACTITIONER

## 2020-10-29 PROCEDURE — 3077F SYST BP >= 140 MM HG: CPT | Mod: CPTII,S$GLB,, | Performed by: NURSE PRACTITIONER

## 2020-10-29 PROCEDURE — 1101F PT FALLS ASSESS-DOCD LE1/YR: CPT | Mod: CPTII,S$GLB,, | Performed by: NURSE PRACTITIONER

## 2020-10-29 PROCEDURE — 99214 OFFICE O/P EST MOD 30 MIN: CPT | Mod: S$GLB,,, | Performed by: NURSE PRACTITIONER

## 2020-10-29 PROCEDURE — 1159F PR MEDICATION LIST DOCUMENTED IN MEDICAL RECORD: ICD-10-PCS | Mod: S$GLB,,, | Performed by: NURSE PRACTITIONER

## 2020-10-29 PROCEDURE — 3008F BODY MASS INDEX DOCD: CPT | Mod: CPTII,S$GLB,, | Performed by: NURSE PRACTITIONER

## 2020-10-29 PROCEDURE — 99999 PR PBB SHADOW E&M-EST. PATIENT-LVL V: CPT | Mod: PBBFAC,,, | Performed by: NURSE PRACTITIONER

## 2020-10-29 PROCEDURE — 1126F PR PAIN SEVERITY QUANTIFIED, NO PAIN PRESENT: ICD-10-PCS | Mod: S$GLB,,, | Performed by: NURSE PRACTITIONER

## 2020-10-29 PROCEDURE — 1126F AMNT PAIN NOTED NONE PRSNT: CPT | Mod: S$GLB,,, | Performed by: NURSE PRACTITIONER

## 2020-10-29 PROCEDURE — 3078F DIAST BP <80 MM HG: CPT | Mod: CPTII,S$GLB,, | Performed by: NURSE PRACTITIONER

## 2020-10-29 PROCEDURE — 99214 PR OFFICE/OUTPT VISIT, EST, LEVL IV, 30-39 MIN: ICD-10-PCS | Mod: S$GLB,,, | Performed by: NURSE PRACTITIONER

## 2020-10-29 PROCEDURE — 1159F MED LIST DOCD IN RCRD: CPT | Mod: S$GLB,,, | Performed by: NURSE PRACTITIONER

## 2020-10-29 PROCEDURE — 3078F PR MOST RECENT DIASTOLIC BLOOD PRESSURE < 80 MM HG: ICD-10-PCS | Mod: CPTII,S$GLB,, | Performed by: NURSE PRACTITIONER

## 2020-10-29 RX ORDER — MONTELUKAST SODIUM 10 MG/1
TABLET ORAL
COMMUNITY
Start: 2020-10-09

## 2020-10-29 RX ORDER — POLYETHYLENE GLYCOL AND PROPYLENE GLYCOL 4; 3 MG/ML; MG/ML
SOLUTION/ DROPS OPHTHALMIC
COMMUNITY
End: 2020-11-02

## 2020-10-29 RX ORDER — FLUTICASONE PROPIONATE 50 MCG
SPRAY, SUSPENSION (ML) NASAL
COMMUNITY
End: 2020-11-02

## 2020-10-29 RX ORDER — METRONIDAZOLE 500 MG/1
TABLET ORAL
COMMUNITY
Start: 2020-10-16 | End: 2020-11-02

## 2020-10-29 NOTE — PROGRESS NOTES
Subjective:       Patient ID: Herminia Wooten is a 70 y.o. female.    Chief Complaint: Dizziness    HPI   Treated for BPPV Right in 2013  Treated for BPPV Right in 2016  Treated for BPPV Left 2017  No BPPV since 2017. Now returns for reported positional vertigo since July, occurs upon rising from laying position or when bending over doing floor exercises.   Dr. Aubrey Mackey diagnosed her with BPPV right after performing Galen-Hallpike in his office recently. She went to [facility] twice and Dr. Gigi Boyle once, yet vertigo continues. She is frustrated with her lack of improvement stating it has never lasted this long before.     Patient states she just finished Cipro & Flagyl for diverticulitis. They have been adjusting her Losartan dose to get her BP regulated.     Review of Systems      Objective:      Physical Exam  Vitals signs and nursing note reviewed.   Constitutional:       General: She is not in acute distress.     Appearance: She is well-developed. She is not ill-appearing or diaphoretic.   HENT:      Head: Normocephalic and atraumatic.      Right Ear: Hearing, tympanic membrane, ear canal and external ear normal. No middle ear effusion. Tympanic membrane is not erythematous.      Left Ear: Hearing, tympanic membrane, ear canal and external ear normal.  No middle ear effusion. Tympanic membrane is not erythematous.      Nose: Nose normal.      Mouth/Throat:      Pharynx: Uvula midline.   Eyes:      General: Lids are normal. No scleral icterus.        Right eye: No discharge.         Left eye: No discharge.   Neck:      Musculoskeletal: Normal range of motion and neck supple.      Trachea: Trachea normal. No tracheal deviation.   Cardiovascular:      Rate and Rhythm: Normal rate.   Pulmonary:      Effort: Pulmonary effort is normal. No respiratory distress.      Breath sounds: No stridor. No wheezing.   Musculoskeletal: Normal range of motion.   Skin:     General: Skin is warm and dry.      Coloration: Skin is  not pale.   Neurological:      Mental Status: She is alert and oriented to person, place, and time.      Coordination: Coordination normal.      Gait: Gait normal.   Psychiatric:         Speech: Speech normal.         Behavior: Behavior normal. Behavior is cooperative.         Thought Content: Thought content normal.         Judgment: Judgment normal.         SLENT:  Audio testing    SLENT:  Audio testing    SLENT:  DPOAEs AU    SLENT:  vestibular testing summary     Assessment:     Recent [facility] reports states pt has a unilateral vestibular weakness (left ear), but no percentage given so not sure if borderline or severe. All other testing done elsewhere appears to be WNL.     Positional vertigo by pt report; however no evidence of BPPV on testing in our clinic today  Plan:     Recommend early morning appt for BPPV testing with goggles    If negative, will send for PT for VRT due to unspecified left sided vestibular weakness at [facility]

## 2020-11-02 ENCOUNTER — OFFICE VISIT (OUTPATIENT)
Dept: GASTROENTEROLOGY | Facility: CLINIC | Age: 70
End: 2020-11-02
Payer: MEDICARE

## 2020-11-02 VITALS — HEIGHT: 59 IN | BODY MASS INDEX: 35.6 KG/M2 | WEIGHT: 176.56 LBS

## 2020-11-02 DIAGNOSIS — R74.01 ELEVATED ALT MEASUREMENT: ICD-10-CM

## 2020-11-02 DIAGNOSIS — R42 VERTIGO: ICD-10-CM

## 2020-11-02 DIAGNOSIS — Z87.19 HISTORY OF CHRONIC CONSTIPATION: ICD-10-CM

## 2020-11-02 DIAGNOSIS — Z90.49 HISTORY OF COLECTOMY: ICD-10-CM

## 2020-11-02 DIAGNOSIS — K76.0 HEPATIC STEATOSIS: ICD-10-CM

## 2020-11-02 DIAGNOSIS — Z87.19 HISTORY OF DIVERTICULITIS: Primary | ICD-10-CM

## 2020-11-02 DIAGNOSIS — K21.00 GASTROESOPHAGEAL REFLUX DISEASE WITH ESOPHAGITIS WITHOUT HEMORRHAGE: ICD-10-CM

## 2020-11-02 PROCEDURE — 99999 PR PBB SHADOW E&M-EST. PATIENT-LVL IV: ICD-10-PCS | Mod: PBBFAC,,, | Performed by: NURSE PRACTITIONER

## 2020-11-02 PROCEDURE — 3008F PR BODY MASS INDEX (BMI) DOCUMENTED: ICD-10-PCS | Mod: CPTII,S$GLB,, | Performed by: NURSE PRACTITIONER

## 2020-11-02 PROCEDURE — 99214 PR OFFICE/OUTPT VISIT, EST, LEVL IV, 30-39 MIN: ICD-10-PCS | Mod: S$GLB,,, | Performed by: NURSE PRACTITIONER

## 2020-11-02 PROCEDURE — 3008F BODY MASS INDEX DOCD: CPT | Mod: CPTII,S$GLB,, | Performed by: NURSE PRACTITIONER

## 2020-11-02 PROCEDURE — 99999 PR PBB SHADOW E&M-EST. PATIENT-LVL IV: CPT | Mod: PBBFAC,,, | Performed by: NURSE PRACTITIONER

## 2020-11-02 PROCEDURE — 1101F PT FALLS ASSESS-DOCD LE1/YR: CPT | Mod: CPTII,S$GLB,, | Performed by: NURSE PRACTITIONER

## 2020-11-02 PROCEDURE — 99214 OFFICE O/P EST MOD 30 MIN: CPT | Mod: S$GLB,,, | Performed by: NURSE PRACTITIONER

## 2020-11-02 PROCEDURE — 1101F PR PT FALLS ASSESS DOC 0-1 FALLS W/OUT INJ PAST YR: ICD-10-PCS | Mod: CPTII,S$GLB,, | Performed by: NURSE PRACTITIONER

## 2020-11-02 PROCEDURE — 1159F MED LIST DOCD IN RCRD: CPT | Mod: S$GLB,,, | Performed by: NURSE PRACTITIONER

## 2020-11-02 PROCEDURE — 1159F PR MEDICATION LIST DOCUMENTED IN MEDICAL RECORD: ICD-10-PCS | Mod: S$GLB,,, | Performed by: NURSE PRACTITIONER

## 2020-11-02 PROCEDURE — 1126F PR PAIN SEVERITY QUANTIFIED, NO PAIN PRESENT: ICD-10-PCS | Mod: S$GLB,,, | Performed by: NURSE PRACTITIONER

## 2020-11-02 PROCEDURE — 1126F AMNT PAIN NOTED NONE PRSNT: CPT | Mod: S$GLB,,, | Performed by: NURSE PRACTITIONER

## 2020-11-02 NOTE — PATIENT INSTRUCTIONS
Diverticulitis    Some people get pouches along the wall of the colon as they get older. The pouches, called diverticuli, usually cause no symptoms. If the pouches become blocked, you can get an infection. This infection is called diverticulitis. It causes pain in your lower abdomen and fever. If not treated, it can become a serious condition, causing an abscess to form inside the pouch. The abscess may block the intestinal tract even or rupture, spreading infection throughout the abdomen.  When treatment is started early, oral antibiotics alone may be enough to cure diverticulitis. This method is tried first. But, if you don't improve or if your condition gets worse while using oral antibiotics, you may need to be admitted to the hospital for IV antibiotics. Severe cases may require surgery.  Home care  The following guidelines will help you care for yourself at home:  · During the acute illness, rest and follow your healthcare provider's instructions about diet. Sometimes you will need to follow a clear liquid diet to rest your bowel. Once your symptoms are better, you may be told to follow a low-fiber diet for some time. Include foods like:  ? Flake cereal, mashed potatoes, pancakes, waffles, pasta, white bread, rice, applesauce, bananas, eggs, fish, poultry, tofu, and cooked soft vegetables  · Take antibiotics exactly as instructed. Don't miss any doses or stop taking the medication, even if you feel better.  · Monitor your temperature and tell your healthcare provider if you have rising temperatures.  Preventing future attacks  Once you have an episode of diverticulitis, you are at risk for having it again. After you have recovered from this episode, you may be able to lower your risk by eating a high-fiber diet (20 gm/day to 35 gm/day of fiber). This cleans out the colon pouches that already exist and may prevent new ones from forming. Foods high in fiber include fresh fruits and edible peelings, raw or  lightly cooked vegetables, whole grain cereals and breads, dried beans and peas, and bran.  Other steps that can help prevent future attacks include:  · Take your medicines, such as antibiotics, as your healthcare provider says.  · Drink 6 to 8 glasses of water every day, unless told otherwise.  · Use a heating pad or hot water bottle to help abdominal cramping or pain.  · Begin an exercise program. Ask your healthcare provider how to get started. You can benefit from simple activities such as walking or gardening.  · Treat diarrhea with a bland diet. Start with liquids only; then slowly add fiber over time.  · Watch for changes in your bowel movements (constipation to diarrhea). Avoid constipation by eating a high fiber diet and taking a stool softener if needed.  · Get plenty of rest and sleep.  Follow-up care  Follow up with your healthcare provider as advised or sooner if you are not getting better in the next 2 days.  When to seek medical advice  Call your healthcare provider right away if any of these occur:  · Fever of 100.4°F (38°C) or higher, or as directed by your healthcare provider  · Repeated vomiting or swelling of the abdomen  · Weakness, dizziness, light-headedness  · Pain in your abdomen that gets worse, severe, or spreads to your back  · Pain that moves to the right lower abdomen  · Rectal bleeding (stools that are red, black or maroon color)  · Unexpected vaginal bleeding  Date Last Reviewed: 9/1/2016  © 1118-3130 [employer]. [address] [place] [address]. All rights reserved. This information is not intended as a substitute for professional medical care. Always follow your healthcare professional's instructions.          GERD (Adult)    The esophagus is a tube that carries food from the mouth to the stomach. A valve at the lower end of the esophagus prevents stomach acid from flowing upward. When this valve doesn't work properly, stomach contents may repeatedly flow  "back up (reflux) into the esophagus. This is called gastroesophageal reflux disease (GERD). GERD can irritate the esophagus. It can cause problems with swallowing or breathing. In severe cases, GERD can cause recurrent pneumonia or other serious problems.  Symptoms of reflux include burning, pressure or sharp pain in the upper abdomen or mid to lower chest. The pain can spread to the neck, back, or shoulder. There may be belching, an acid taste in the back of the throat, chronic cough, or sore throat or hoarseness. GERD symptoms often occur during the day after a big meal. They can also occur at night when lying down.   Home care  Lifestyle changes can help reduce symptoms. If needed, medicines may be prescribed. Symptoms often improve with treatment, but if treatment is stopped, the symptoms often return after a few months. So most persons with GERD will need to continue treatment.  Lifestyle changes  · Limit or avoid fatty, fried, and spicy foods, as well as coffee, chocolate, mint, and foods with high acid content such as tomatoes and citrus fruit and juices (orange, grapefruit, lemon).  · Dont eat large meals, especially at night. Frequent, smaller meals are best. Do not lie down right after eating. And dont eat anything 3 hours before going to bed.  · Avoid drinking alcohol and smoking. As much as possible, stay away from second hand smoke.  · If you are overweight, losing weight will reduce symptoms.   · Avoid wearing tight clothing around your stomach area.  · If your symptoms occur during sleep, use a foam wedge to elevate your upper body (not just your head.) Or, place 4" blocks under the head of your bed.  Medicines  If needed, medicines can help relieve the symptoms of GERD and prevent damage to the esophagus. Discuss a medicine plan with your healthcare provider. This may include one or more of the following medicines:  · Antacids to help neutralize the normal acids in your stomach.  · Acid blockers " (H2 blockers) to decrease acid production.  · Acid inhibitors (PPIs) to decrease acid production in a different way than the blockers. They may work better, but can take a little longer to take effect.  Take an antacid 30-60 minutes after eating and at bedtime, but not at the same time as an acid blocker.  Try not to take medicines such as ibuprofen and aspirin. If you are taking aspirin for your heart or other medical reasons, talk to your healthcare provider about stopping it.  Follow-up care  Follow up with your healthcare provider or as advised by our staff.  When to seek medical advice  Call your healthcare provider if any of the following occur:  · Stomach pain gets worse or moves to the lower right abdomen (appendix area)  · Chest pain appears or gets worse, or spreads to the back, neck, shoulder, or arm  · Frequent vomiting (cant keep down liquids)  · Blood in the stool or vomit (red or black in color)  · Feeling weak or dizzy  · Fever of 100.4ºF (38ºC) or higher, or as directed by your healthcare provider  Date Last Reviewed: 6/23/2015  © 1327-8801 [employer]. [address] [place] [address]. All rights reserved. This information is not intended as a substitute for professional medical care. Always follow your healthcare professional's instructions.

## 2020-11-02 NOTE — PROGRESS NOTES
Subjective:       Patient ID: Herminia Wooten is a 70 y.o. female Body mass index is 35.67 kg/m².    Chief Complaint: Hospital Follow Up and Diverticulitis  Established patient of MARISA Mccloud NP, & myself.    GI Problem  Primary symptoms do not include fever, weight loss, fatigue, abdominal pain (had started early 10/2020, went to the ER for it on 10/17/2020; has resolved with completing 2 week courses of cipro and flagyl), nausea, vomiting, diarrhea, melena, hematemesis, hematochezia or dysuria.   The illness is also significant for bloating (occasional) and constipation (chronic for several years; bowel movements are 1-3 times a day of formed stool; TREATMENT: miralax 17 grams daily PRN, PAST TREATMENT: colace, enemas help, dulcolax, linzess- doesn't remember taking it). The illness does not include chills, dysphagia or odynophagia. Significant associated medical issues include GERD (flared-up recently with antibiotics for diverticulitis; protonix 40 mg once daily prn- taking 1-2 times a week, mylanta prn- taking 2-3 times a week; PAST TREATMENTS: protonix, zantac), bowel resection (colectomy due to endometriosis & rectocele repair), irritable bowel syndrome (PAST TREATMENT: bentyl), hemorrhoids and diverticulitis. Associated medical issues do not include inflammatory bowel disease.     Review of Systems   Constitutional: Negative for appetite change, chills, fatigue, fever and weight loss.   HENT: Negative for sore throat and trouble swallowing.    Respiratory: Negative for cough, choking and shortness of breath.    Cardiovascular: Negative for chest pain.   Gastrointestinal: Positive for bloating (occasional) and constipation (chronic for several years; bowel movements are 1-3 times a day of formed stool; TREATMENT: miralax 17 grams daily PRN, PAST TREATMENT: colace, enemas help, dulcolax, linzess- doesn't remember taking it). Negative for abdominal pain (had started early 10/2020, went to the ER for it  on 10/17/2020; has resolved with completing 2 week courses of cipro and flagyl), anal bleeding, blood in stool, diarrhea, dysphagia, hematemesis, hematochezia, melena, nausea, rectal pain and vomiting.   Genitourinary: Negative for difficulty urinating, dysuria and flank pain.   Neurological: Positive for dizziness (patient reports he most concerning symptoms she is experiencing now is vertigo; seeing ENT for it; scheduled for testing later this week; been having since 7/2020). Negative for weakness.   Psychiatric/Behavioral: The patient is nervous/anxious.        Patient's last menstrual period was 10/07/1976. s/p hysterectomy    Past Medical History:   Diagnosis Date    Anesthesia complication     elevated BP    Colon polyp     Coronary artery disease     Diverticulitis     Diverticulosis     Encounter for blood transfusion     Fatty liver 4/25/2016    GERD (gastroesophageal reflux disease)     Hyperlipidemia     Hypertension     Hypothyroid     Irritable bowel syndrome     Kidney stone     Liver hemangioma     Seasonal allergies     Thyroid disease      Past Surgical History:   Procedure Laterality Date    ABDOMINAL SURGERY      APPENDECTOMY      CARDIAC CATHETERIZATION      2 stents    CHOLECYSTECTOMY      COLECTOMY      endometriosis- 8.5 inches removed    COLONOSCOPY  2016    repeat in 5    COLONOSCOPY N/A 6/27/2016    Procedure: COLONOSCOPY;  Surgeon: Avel Luna Jr., MD;  Location[facility] END[facility];  Service: Endoscopy;  Laterality: N/A; repeat in 5 years for surveillance    COLONOSCOPY N/A 7/27/2020    Procedure: COLONOSCOPY;  Surgeon: Avel Luna Jr., MD;  Location: [facility];  Service: Endoscopy;  Laterality: N/A; Irritable bowel syndrome(?). hemorrhoids, 2 colon polyps removed, diverticulosis, repeat in 5 years for surveillance. biopsy: Hyperplastic polyp    CORONARY ANGIOPLASTY WITH STENT PLACEMENT      x 2    ESOPHAGOGASTRODUODENOSCOPY N/A 7/27/2020    Procedure: EGD  (ESOPHAGOGASTRODUODENOSCOPY);  Surgeon: Avel De La Cruz Jr., MD;  Location: [facility];  Service: Endoscopy;  Laterality: N/A; unremarkable; biopsy: stomach- Mild chronic gastritis, negative for h pylori. duodenum WNL    HYSTERECTOMY      INCONTINENCE SURGERY      INJECTION OF ANESTHETIC AGENT INTO SACROILIAC JOINT Right 7/3/2019    Procedure: BLOCK, SACROILIAC JOINT;  Surgeon: Homer Diamond MD;  Location: [facility];  Service: Pain Management;  Laterality: Right;    INJECTION OF FACET JOINT Right 8/28/2019    Procedure: INJECTION, FACET JOINT;  Surgeon: Homer Diamond MD;  Location: [facility];  Service: Pain Management;  Laterality: Right;  L4 and L5    JOINT REPLACEMENT Right     Knee    OOPHORECTOMY      rectocele  05/2017    repair    Stent OM      UPPER GASTROINTESTINAL ENDOSCOPY  6/2013 Dr. Peters    reflux esophagitis; biopsy: negative dysplasia, intestinal metaplasia; mild chronic inflammation- GERD related & regenerative glandular epithelial change; strips of squamous esophageal mucosa with moderate basal epithelial cell hyperplasia and rare intraepithelial eosinophils (< 1 per 10 high power fields)    UPPER GASTROINTESTINAL ENDOSCOPY  06/2016    Dr. de la cruz    URETHRA SURGERY       Family History   Problem Relation Age of Onset    Clotting disorder Maternal Grandmother     Diverticulitis Maternal Grandfather     Anesthesia problems Neg Hx     Breast cancer Neg Hx     Ovarian cancer Neg Hx     Colon cancer Neg Hx     Colon polyps Neg Hx     Crohn's disease Neg Hx     Ulcerative colitis Neg Hx      Wt Readings from Last 10 Encounters:   11/02/20 80.1 kg (176 lb 9.4 oz)   10/29/20 80 kg (176 lb 5.9 oz)   10/17/20 79.6 kg (175 lb 7.8 oz)   07/24/20 79.4 kg (175 lb)   06/09/20 83.5 kg (184 lb 1.4 oz)   02/05/20 78.7 kg (173 lb 8 oz)   11/18/19 79.2 kg (174 lb 9.7 oz)   08/28/19 87.5 kg (192 lb 14.4 oz)   08/23/19 73.5 kg (162 lb)   08/23/19 73.8 kg (162 lb 11.2 oz)     Lab Results    Component Value Date    WBC 10.21 10/17/2020    HGB 13.8 10/17/2020    HCT 42.6 10/17/2020    MCV 92 10/17/2020     10/17/2020     CMP  Sodium   Date Value Ref Range Status   10/17/2020 139 136 - 145 mmol/L Final     Potassium   Date Value Ref Range Status   10/17/2020 4.3 3.5 - 5.1 mmol/L Final     Chloride   Date Value Ref Range Status   10/17/2020 103 95 - 110 mmol/L Final     CO2   Date Value Ref Range Status   10/17/2020 28 22 - 31 mmol/L Final     Glucose   Date Value Ref Range Status   10/17/2020 228 (H) 70 - 110 mg/dL Final     Comment:     The ADA recommends the following guidelines for fasting glucose:  Normal:       less than 100 mg/dL  Prediabetes:  100 mg/dL to 125 mg/dL  Diabetes:     126 mg/dL or higher       BUN   Date Value Ref Range Status   10/17/2020 11 7 - 18 mg/dL Final     Creatinine   Date Value Ref Range Status   10/17/2020 0.63 0.50 - 1.40 mg/dL Final     Calcium   Date Value Ref Range Status   10/17/2020 9.7 8.4 - 10.2 mg/dL Final     Total Protein   Date Value Ref Range Status   10/17/2020 7.8 6.0 - 8.4 g/dL Final     Albumin   Date Value Ref Range Status   10/17/2020 4.6 3.5 - 5.2 g/dL Final     Total Bilirubin   Date Value Ref Range Status   10/17/2020 0.5 0.2 - 1.3 mg/dL Final     Alkaline Phosphatase   Date Value Ref Range Status   10/17/2020 110 38 - 145 U/L Final     AST   Date Value Ref Range Status   10/17/2020 34 14 - 36 U/L Final     ALT   Date Value Ref Range Status   10/17/2020 38 (H) 0 - 35 U/L Final     Anion Gap   Date Value Ref Range Status   10/17/2020 8 8 - 16 mmol/L Final     eGFR if    Date Value Ref Range Status   10/17/2020 >60 >60 mL/min/1.73 m^2 Final     eGFR if non    Date Value Ref Range Status   10/17/2020 >60 >60 mL/min/1.73 m^2 Final     Comment:     Calculation used to obtain the estimated glomerular filtration  rate (eGFR) is the CKD-EPI equation.        Lab Results   Component Value Date    OCCULTBLOOD Negative  10/17/2020     Lab Results   Component Value Date    LIPASE 37 06/15/2020     Lab Results   Component Value Date    LIPASERES 131 10/17/2020     Lab Results   Component Value Date    AMYLASE 82 04/13/2016     Reviewed prior medical records including radiology report of 10/17/2020 ER visit note & CT abdomen pelvis, 3/13/2019 HIDA scan; 3/4/2019 abdominal ultrasound, 5/10/17 MRI abdomen; & endoscopy history (see surgical history).    Objective:      Physical Exam  Vitals signs and nursing note reviewed.   Constitutional:       General: She is not in acute distress.     Appearance: Normal appearance. She is well-developed. She is not diaphoretic.   HENT:      Mouth/Throat:      Comments: Patient is wearing a face mask, which covers patient's mouth and nose, due to COVID 19 concerns.  Eyes:      General: No scleral icterus.     Conjunctiva/sclera: Conjunctivae normal.      Pupils: Pupils are equal, round, and reactive to light.   Pulmonary:      Effort: Pulmonary effort is normal. No respiratory distress.      Breath sounds: Normal breath sounds. No wheezing.   Abdominal:      General: Bowel sounds are normal. There is no distension or abdominal bruit.      Palpations: Abdomen is soft. Abdomen is not rigid. There is no mass.      Tenderness: There is no abdominal tenderness. There is no guarding or rebound. Negative signs include Leyva's sign and McBurney's sign.   Skin:     General: Skin is warm and dry.      Coloration: Skin is not pale.      Findings: No erythema or rash.      Comments: Non-jaundiced   Neurological:      Mental Status: She is alert and oriented to person, place, and time.   Psychiatric:         Behavior: Behavior normal.      Comments: Patient tearful during history portion of visit while discussing vertigo.         Assessment:       1. History of diverticulitis    2. History of colectomy    3. Gastroesophageal reflux disease with esophagitis without hemorrhage    4. Elevated ALT measurement    5.  Hepatic steatosis    6. Vertigo    7. History of chronic constipation        Plan:       History of diverticulitis & History of colectomy  - discussed about possible repeat CT scan to confirm resolution; patient verbalized understanding and patient wants to hold off on repeat CT scan at this time since she had two CT scan done within the 6 months unless abdominal pain returns.  - discussed the diagnosis of diverticulosis and diverticulitis. Advised a low fiber diet is recommended for diverticulitis (for about 4 weeks), but to prevent diverticulitis, high fiber diet is recommended.  -Recommended high fiber diet after episode has completely resolved. Recommended daily exercise, adequate water intake (six 8-oz glasses of water daily), and high fiber diet. OTC fiber supplements are recommended if diet does not reach daily fiber goal (25 grams daily), such as Metamucil, Citrucel, or FiberCon (take as directed, separate from other oral medications by >2 hours).  - discussed with patient that if episodes of diverticulitis recur frequently, may need to consult general surgery    Gastroesophageal reflux disease with esophagitis without hemorrhage  - INCREASE TO PRILOSEC 40 MG ONCE DAILY AS PRESCRIBED RATHER THAN PRN  - avoid/minimize use of NSAIDs- since they can cause GI upset, bleeding and/or ulcers. If NSAID must be taken, recommend take with food.  - Possible EGD pending results of testing and if symptoms persist    Elevated ALT measurement & Hepatic steatosis  -     Hepatic Function Panel; Future; Expected date: 11/16/2020  -     Hepatitis Panel, Acute; Future; Expected date: 11/16/2020  For fatty liver recommend: low fat, low cholesterol diet, maintain good control of blood sugars and cholesterol levels, exercise, weight loss (if overweight), minimize/avoid alcohol and tylenol products, & follow-up with PCP for continued evaluation and management; if specialist is needed, recommend seeing  hepatology.    Vertigo  Recommend follow-up with Primary Care Provider & ENT for continued evaluation and management.    History of chronic constipation  Recommend daily exercise as tolerated, adequate water intake (six 8-oz glasses of water daily), and high fiber diet. OTC fiber supplements are recommended if diet does not reach daily fiber goal (20-30 grams daily), such as Metamucil, Citrucel, or FiberCon (take as directed, separate from other oral medications by >2 hours).  - CONTINUE OTC MiraLax once daily (17g PO) as directed PRN BREAKTHROUGH CONSTIPATION  -If still no improvement with these measures, call/follow-up    Follow up in about 1 month (around 12/2/2020), or if symptoms worsen or fail to improve.    If no improvement in symptoms or symptoms worsen, call/follow-up at clinic or go to ER.

## 2020-11-03 ENCOUNTER — TELEPHONE (OUTPATIENT)
Dept: OTOLARYNGOLOGY | Facility: CLINIC | Age: 70
End: 2020-11-03

## 2020-11-03 NOTE — TELEPHONE ENCOUNTER
----- Message from Saul HAGAN Frisard sent at 11/3/2020  9:36 AM CST -----  Contact: patient  Patient called in and stated she has an appointment tomorrow Wednesday 11/4/20 at 8:30am.  Patient wanted to make sure appointment was scheduled in the audio department because she paid a co-pay & was seen by Meredith???      Patient call back number is [phone]

## 2020-11-03 NOTE — TELEPHONE ENCOUNTER
"S/w pt and advised her appt with Audio is Thursday at 830. Pt confirmed and states that she doesn't understand why she had to see Meredith and pay $45 co-pay and didn't get "fixed" then. Pt also states that she should have been able to just go across the contre[place] and have the Maneuver done the day she say Meredith. I tried explaining to the pt that if the schedule is full for Audio that we can't just let her "go across the contreras" for the maneuver. Pt is not happy that she has to pay another $45 to "get her crystals fixed"  I explained to the pt that she has not been seen in ENT for dizziness in 2 yrs and therefore she needs an appt with the provider to rule-out things before having the maneuver. Pt wants to just come in for the maneuver in the future and Not to see the ENT provider. I advised pt to talk to Fidelina about that when she comes for her appt on Thursday. Pt verbalized understanding.   "

## 2020-11-04 NOTE — TELEPHONE ENCOUNTER
Spoke with patient at length regarding BPPV versus vestibular weakness testing, diagnosis and treatments.  Explained what will and will not be tested during tomorrow's visit.  Confirmed date, time and location of appts. Understanding voiced.

## 2020-11-05 ENCOUNTER — CLINICAL SUPPORT (OUTPATIENT)
Dept: AUDIOLOGY | Facility: CLINIC | Age: 70
End: 2020-11-05
Payer: MEDICARE

## 2020-11-05 DIAGNOSIS — R42 DIZZINESS: Primary | ICD-10-CM

## 2020-11-05 DIAGNOSIS — H81.8X2 LEFT-SIDED VESTIBULAR WEAKNESS: Primary | ICD-10-CM

## 2020-11-05 PROCEDURE — 92541 PR SPONTANEOUS NYSTAGMUS TEST: ICD-10-PCS | Mod: S$GLB,,, | Performed by: AUDIOLOGIST

## 2020-11-05 PROCEDURE — 92541 SPONTANEOUS NYSTAGMUS TEST: CPT | Mod: S$GLB,,, | Performed by: AUDIOLOGIST

## 2020-11-05 PROCEDURE — 92542 POSITIONAL NYSTAGMUS TEST: CPT | Mod: 59,S$GLB,, | Performed by: AUDIOLOGIST

## 2020-11-05 PROCEDURE — 92542 PR POSITIONAL NYSTAGMUS TEST: ICD-10-PCS | Mod: 59,S$GLB,, | Performed by: AUDIOLOGIST

## 2020-11-05 NOTE — PROGRESS NOTES
"Herminia Wooten was seen 11/5/20 for a BPPV evaluation.    Patient reported that she gets a sense of movement when she turns to lay on her left side in bed.  She said that she has recently been diagnosed with BPPV but multiple attempts at canalith repositioning maneuvers at various ENT offices have not resolved the issue.  She feels that the problem is the "crystals" in her ear but does not understand why she has been treated for this at other ENT offices recently without resolution of her symptoms.  She was also told that she has a left vestibular weakness on a recent VNG done at a non-[facility] clinic and that balance rehab is recommended.  She has not begun therapy yet.       VAT was negative right and negative left     Gaze nystagmus was absent  Spontaneous nystagmus was absent    Head Right Positional was negative  Head Left Positional was negative  Body Right Positional was negative  Body Left Positional was negative    Hallpike Right indicated possible ASCC BPPV due to 4 d/s RB nystagmus with a 6 d/s DB slant with possible torsion.    Hallpike Left was negative for BPPV (yielded some square waveform nystagmus)   Hallpike Center yielded 7 d/s DB nystagmus with fixation     A Deep Head-hanging maneuver was performed due to possible ASCC BPPV.  Patient tolerated the maneuver well and was asymptomatic upon discharge.  Post maneuver instructions were given to the patient both verbally and written.  Pt was counseled that it is likely that her dizziness and "sensation of movement" is likely due to the left vestibular weakness that was found on her recent VNG.  Understanding was voiced.    A follow-up appointment (Suri) has been scheduled for one week to determine if patient's symptoms have improved any following treatment for possible ASCC BPPV.  Even if there is improvement the patient is still a candidate for balance therapy/VRT so a referral to PT is recommended at this time .    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

## 2020-11-05 NOTE — Clinical Note
"Meredith,     I rechecked her for BPPV and it is not likely but it is possible that she may have a mild case of ASCC BPPV.   I really think that more of her symptoms are due to the left vestibular weakness.  She and I had a long conversation about why treating her for "crystals" isn't going to fix the dizziness she is having from the vestibular weakness so she understands why she is being referred to PT.      Please enter a referral for her to see PT for balance rehab/formal VRT.  I will be seeing her back next week just to see if she had any improvement from the canalith repositioning maneuver I did in case she does have a little ASCC BPPV but she will still need PT regardless.     Thanks. "

## 2020-11-11 ENCOUNTER — TELEPHONE (OUTPATIENT)
Dept: AUDIOLOGY | Facility: CLINIC | Age: 70
End: 2020-11-11

## 2020-11-11 NOTE — TELEPHONE ENCOUNTER
Discussed the purpose of her BPPV recheck before beginning balance rehab to determine if she had any change in her nystagmus and any improvement in her possible ASCC BPPV.  Understanding voiced.

## 2020-11-11 NOTE — TELEPHONE ENCOUNTER
----- Message from Nancy Connell MA sent at 11/11/2020  1:38 PM CST -----  Regarding: regarding  Contact: patient  Type:  Patient requesting a return call     Who Called:  patient   Who Left Message for Patient:  patient asking for Fidelina to give her a call   Does the patient know what this is regarding?:  did not state reason why   Best Call Back Number:  [phone] (home)     Additional Information:

## 2020-11-12 ENCOUNTER — CLINICAL SUPPORT (OUTPATIENT)
Dept: AUDIOLOGY | Facility: CLINIC | Age: 70
End: 2020-11-12
Payer: MEDICARE

## 2020-11-12 DIAGNOSIS — R42 DIZZINESS: Primary | ICD-10-CM

## 2020-11-12 PROCEDURE — 92542 PR POSITIONAL NYSTAGMUS TEST: ICD-10-PCS | Mod: S$GLB,,, | Performed by: AUDIOLOGIST

## 2020-11-12 PROCEDURE — 92542 POSITIONAL NYSTAGMUS TEST: CPT | Mod: S$GLB,,, | Performed by: AUDIOLOGIST

## 2020-11-12 NOTE — PROGRESS NOTES
"Herminia Wooten was seen 11/12/20 for a BPPV follow-up.      Patient reported that she is scheduled to begin PT and has not felt any major changes since her last visit.  She still has some good days and some bad days.      Results:   Head Right Positional was negative  Head Left Positional was negative    Body Right Positional was negative  Body Left Positional was negative    Hallpike Right was negative  Hallpike Left was negative    Impression:  No evidence of BPPV at this time.  Reassured patient that her "crystals" are not out of place and are not the main reason for her continued symptoms at this time.      Pt advised to proceed with PT as planned.   "

## 2020-11-18 ENCOUNTER — CLINICAL SUPPORT (OUTPATIENT)
Dept: REHABILITATION | Facility: HOSPITAL | Age: 70
End: 2020-11-18
Payer: MEDICARE

## 2020-11-18 DIAGNOSIS — R42 DIZZINESS: ICD-10-CM

## 2020-11-18 DIAGNOSIS — H81.8X2 LEFT-SIDED VESTIBULAR WEAKNESS: ICD-10-CM

## 2020-11-18 PROCEDURE — 97161 PT EVAL LOW COMPLEX 20 MIN: CPT | Mod: PO | Performed by: PHYSICAL THERAPIST

## 2020-11-18 NOTE — PATIENT INSTRUCTIONS
Gaze Stabilization: Tip Card  1. Target must remain in focus, not blurry, and appear stationary while head is in motion.  2. Perform exercises with small head movements (45° to either side of midline).  3. Increase speed of head motion so long as target is in focus.  4. If you wear eyeglasses, be sure you can see target through lens (therapist will give specific instructions for bifocal / progressive lenses).  5. These exercises may provoke dizziness or nausea. Work through these symptoms. If too dizzy, slow head movement slightly. Rest between each exercise.  6. Exercises demand concentration; avoid distractions.  7. For safety, perform standing exercises close to a counter, wall, corner, or next to someone.    Copyright © I. All rights reserved.       Gaze Stabilization: Sitting    Keeping eyes on target on plain wall arms' length away, tilt head slightly down and move head side to side for 30 seconds or until symptoms start. Repeat while moving head up and down for 30 seconds or until symptoms start.  Do 2 sessions per day.        Oculomotor: Saccades    Holding two targets positioned side by side shoulder width apart, move eyes quickly from target to target as head stays still. Change targets to hold one above the other, about 8-10 inches apart.  Move 30 seconds each direction or until symptoms start.  Perform sitting.  Repeat 3 times per session. Do 2 sessions per day.         Compensatory Strategies: Corrective Saccades    1. Holding two stationary targets (or thumbs) shoulder width apart, move eyes to target, keep head still.  2. Then slowly move head in direction of target while eyes remain on target.  3/4. Repeat in opposite direction.  Perform sitting. Repeat sequence 10 times per session. Do 2 sessions per day.         Head Rolls    With head in comfortable, centered position, gently roll head in a [place]. Perform slowly and keep eyes open.  Repeat 10 times in clockwise and counter clockwise directions.  Do 2 sessions per day.

## 2020-11-18 NOTE — PLAN OF CARE
[facility]  Physical Therapy Initial Evaluation    Name: Herminia Wooten  Clinic Number: [id number]    Therapy Diagnosis:   Encounter Diagnoses   Name Primary?    Left-sided vestibular weakness     Dizziness      Physician: Meredith Gonsalves NP    Physician Orders: PT Eval and Treat   Medical Diagnosis from Referral:   Left-sided vestibular weakness     Evaluation Date: 11/18/2020  Authorization Period Expiration: 12/31/2020  Plan of Care Expiration: 12/31/2020  Visit # / Visits authorized: 1    Time In: 1500  Time Out: 1545  Total Billable Time: 45 minutes    Precautions: Standard    Subjective   Date of onset: 10/29/2020  History of current condition - Huma reports: having left sided weakness/dizzines per report. Patient reports history of BPPV but was told by two doctors that is was not it this go around. Dizziness is intermittent noted. No falls noted. No faint like spells.     Does looking up increase your problem? Yes   Because of your problem, do you have difficulty getting into or out of bed? Yes   Do quick movements of your head increase your problem? Yes   Does bending over increase your problem? Yes    Past Medical History:   Diagnosis Date    Anesthesia complication     elevated BP    Colon polyp     Coronary artery disease     Diverticulitis     Diverticulosis     Encounter for blood transfusion     Fatty liver 4/25/2016    GERD (gastroesophageal reflux disease)     Hyperlipidemia     Hypertension     Hypothyroid     Irritable bowel syndrome     Kidney stone     Liver hemangioma     Seasonal allergies     Thyroid disease      Herminia Wooten  has a past surgical history that includes Hysterectomy; Appendectomy; Urethra surgery; Incontinence surgery; Stent OM; Oophorectomy; Cardiac catheterization; Abdominal surgery; rectocele (05/2017); Colectomy; Colonoscopy (2016); Colonoscopy (N/A, 6/27/2016); Cholecystectomy; Coronary angioplasty with stent; Joint  "replacement (Right); Injection of anesthetic agent into sacroiliac joint (Right, 7/3/2019); Injection of facet joint (Right, 8/28/2019); Upper gastrointestinal endoscopy (6/2013 Dr. Peters); Upper gastrointestinal endoscopy (06/2016); Esophagogastroduodenoscopy (N/A, 7/27/2020); and Colonoscopy (N/A, 7/27/2020).    Herminia has a current medication list which includes the following prescription(s): aluminum & magnesium hydroxide-simethicone, atenolol, atorvastatin, augmented betamethasone, betamethasone valerate 0.1%, cholecalciferol (vitamin d3), clobetasol, dicyclomine, estradiol, flovent hfa, fluticasone propionate, ibuprofen, levothyroxine, lorazepam, losartan, mometasone, montelukast, omeprazole, and polyethylene glycol.    Review of patient's allergies indicates:   Allergen Reactions    Codeine Other (See Comments)     nervousness    Caffeine Anxiety     In medications    Percodan [oxycodone hcl-oxycodone-asa] Anxiety        Imaging: none noted    Prior Therapy: PT for right knee 2 years ago  Social History:  lives with their family  Occupation: Retired  Prior Level of Function: independent  Current Level of Function: modified independent, increase time noted with home management  Recent or major surgery: none  Accidents: none noted    Pain:  Current 0/10, worst 0/10, best 0/10   Location: NA  Description: NA  Aggravating Factors: NA  Easing Factors: NA    Pts goals: decrease     Objective   Posture:cervical protraction    Oculomotor:  Spontaneous Nystagmus    -  Smooth Pursuits -  Saccades -  Convergence    -    VOR:  Head Thrust:  Mildly + right side saccade    Static Balance:   Romberg(EC 30") + mild swaying  Single Leg Stance: (EO 30") 0    Dynamic Balance:  CTSIB: + moderate swaying, no loss of balance    Special Testing: BPPV  [place]-Hallpike Test:(Anterior or Posterior Canal) -  (f/b Epley Maneuver if +)    Roll Test: Horizontal Canal BPPV -    Transfers: independent    Gait: independent    CMS " "Impairment/Limitation/Restriction for FOTO Upper Leg Survey Status Limitation G-Code CMS Severity Modifier Intake 52% 48% Current Status CK - At least 40 percent but less than 60 percent Predicted 65% 35% Goal Status+ CJ - At least 20 percent but less than 40 percent        TREATMENT   Treatment Time In: 1340  Treatment Time Out: 1345  Total Treatment time separate from Evaluation: 5 minutes    Huma participated in neuromuscular re-education activities to improve: Balance, Coordination, Kinesthetic, Sense, Proprioception, Posture and vestibular for 5 minutes. The following activities were included:  Seated: horizontal head turns up to 30"    Home Exercises and Patient Education Provided    Education provided:   - Yes    Written Home Exercises Provided: yes.  Exercises were reviewed and Huma was able to demonstrate them prior to the end of the session.  Huma demonstrated good  understanding of the education provided.     See EMR under Patient Instructions for exercises provided 11/18/2020.    Assessment   Herminia is a 70 y.o. female referred to outpatient Physical Therapy with a medical diagnosis of Left-sided vestibular weakness . Pt presents with vestibular/dizziness.    Problem List: decreased balance and stability and inability to participate fully in vocation pursuits.    Pt prognosis is Good.   Pt will benefit from skilled outpatient Physical Therapy to address the deficits stated above and in the chart below, provide pt/family education, and to maximize pt's level of independence.     Plan of care discussed with patient: Yes  Pt's spiritual, cultural and educational needs considered and patient is agreeable to the plan of care and goals as stated below:     Anticipated Barriers for therapy: none    Medical Necessity is demonstrated by the following  History  Co-morbidities and personal factors that may impact the plan of care Co-morbidities:   advanced age and high BMI    Personal Factors:   no deficits   "   moderate   Examination  Body Structures and Functions, activity limitations and participation restrictions that may impact the plan of care Body Regions:   back  lower extremities  trunk    Body Systems:    balance  gait  transfers  transitions  motor control    Participation Restrictions:   Home management  Community ambulation    Activity limitations:   Learning and applying knowledge  no deficits    General Tasks and Commands  no deficits    Communication  no deficits    Mobility  walking    Self care  no deficits    Domestic Life  doing house work (cleaning house, washing dishes, laundry)    Interactions/Relationships  no deficits    Life Areas  no deficits    Community and Social Life  no deficits         high   Clinical Presentation stable and uncomplicated low   Decision Making/ Complexity Score: low     Short Term GOALS:  In 4 weeks, pt. will:  - demonstrate seated VOR up to 2 minutes tolerance for ADL purposes.  - report 25% reduction in dizziness with ambulatory/home management tasks.  - decrease outcome measure limitation to <48%    Long Term GOALS:  In 6 weeks, pt. will:  - be independent and compliant with HEP and SX management   - decrease outcome measure limitation to <40%  - return to home management independently with > 50% reduction in dizziness episodes.  - demonstrate standing VOR up to 2 minutes tolerance for ADL purposes.    Plan   Plan of care Certification: 11/18/2020 to 12/31/2020  Outpatient Physical Therapy 2 times weekly for 6 weeks to include the following interventions: Gait Training, Neuromuscular Re-ed, Patient Education, Therapeutic Activites and Therapeutic Exercise.      Herminia may at times be seen by a PTA as part of the Rehab Team.    Cricket Morgan, PT

## 2020-11-20 ENCOUNTER — CLINICAL SUPPORT (OUTPATIENT)
Dept: REHABILITATION | Facility: HOSPITAL | Age: 70
End: 2020-11-20
Payer: MEDICARE

## 2020-11-20 DIAGNOSIS — R42 DIZZINESS: Primary | ICD-10-CM

## 2020-11-20 PROCEDURE — 97112 NEUROMUSCULAR REEDUCATION: CPT | Mod: PO | Performed by: PHYSICAL THERAPIST

## 2020-11-20 NOTE — PROGRESS NOTES
"  Physical Therapy Daily Treatment Note     Name: Herminia Wooten  Clinic Number: [id number]    Therapy Diagnosis:   Encounter Diagnosis   Name Primary?    Dizziness Yes     Physician: Meredith Gonsalves NP    Visit Date: 11/20/2020  Physician Orders: PT Eval and Treat   Medical Diagnosis from Referral:   Left-sided vestibular weakness      Evaluation Date: 11/18/2020  Authorization Period Expiration: 12/31/2020  Plan of Care Expiration: 12/31/2020  Visit # / Visits authorized: 2     Time In: 0930  Time Out: 1015  Total Billable Time: 38 minutes     Precautions: Standard    Subjective     Pt reports: having not much dizziness this am. Patient reported more dizziness but no vertigo last night due to stress.  She was compliant with home exercise program.  Response to previous treatment: too soon to tell  Functional change: Too soon to tell.    Pain: 0/10  Location: none    Objective     Huma participated in neuromuscular re-education activities to improve: Balance and vestibular for 40 minutes. The following activities were included:  Posture:cervical protraction     Oculomotor:  Spontaneous Nystagmus    -  Smooth Pursuits         -  Saccades        -  Convergence    -     VOR:  Head Thrust:  Mildly + right side saccade     Static Balance:   Romberg(EC 30") + mild swaying  Single Leg Stance: (EO 30") 0     Dynamic Balance:  CTSIB: + moderate swaying, no loss of balance     Special Testing: BPPV  [place]-Hallpike Test:(Anterior or Posterior Canal) -  (f/b Epley Maneuver if +)     Roll Test: Horizontal Canal BPPV -    Seated: VOR (horizontal)  30", 1', 2'    VOR cancellation:  30", 1', 2'    Standing: VOR (horizontal)  30", 1', 2'    Home Exercises Provided and Patient Education Provided     Education provided:   - Yes    Written Home Exercises Provided: Patient instructed to cont prior HEP.  Exercises were reviewed and Huma was able to demonstrate them prior to the end of the session.  Huma demonstrated good  " understanding of the education provided.     See EMR under Patient Instructions for exercises provided prior visit.    Assessment   Patient demonstrated good tolerance with seated and standing VOR with mild dizziness. No vertigo    Huma is progressing well towards her goals.   Pt prognosis is Good.     Pt will continue to benefit from skilled outpatient physical therapy to address the deficits listed in the problem list box on initial evaluation, provide pt/family education and to maximize pt's level of independence in the home and community environment.     Pt's spiritual, cultural and educational needs considered and pt agreeable to plan of care and goals.    Anticipated barriers to physical therapy: none    Goals:   Short Term GOALS:  In 4 weeks, pt. will:  - demonstrate seated VOR up to 2 minutes tolerance for ADL purposes.  - report 25% reduction in dizziness with ambulatory/home management tasks.  - decrease outcome measure limitation to <48%     Long Term GOALS:  In 6 weeks, pt. will:  - be independent and compliant with HEP and SX management   - decrease outcome measure limitation to <40%  - return to home management independently with > 50% reduction in dizziness episodes.  - demonstrate standing VOR up to 2 minutes tolerance for ADL purposes.     Plan     Continue POC    Cricket Morgan, PT

## 2020-11-30 ENCOUNTER — CLINICAL SUPPORT (OUTPATIENT)
Dept: REHABILITATION | Facility: HOSPITAL | Age: 70
End: 2020-11-30
Payer: MEDICARE

## 2020-11-30 DIAGNOSIS — R42 DIZZINESS: Primary | ICD-10-CM

## 2020-11-30 PROCEDURE — 97112 NEUROMUSCULAR REEDUCATION: CPT | Mod: PO | Performed by: PHYSICAL THERAPIST

## 2020-11-30 NOTE — PROGRESS NOTES
"  Physical Therapy Daily Treatment Note     Name: Herminia Wooten  Clinic Number: [id number]    Therapy Diagnosis:   Encounter Diagnosis   Name Primary?    Dizziness Yes     Physician: Meredith Gonsalves NP    Visit Date: 11/30/2020  Physician Orders: PT Eval and Treat   Medical Diagnosis from Referral:   Left-sided vestibular weakness      Evaluation Date: 11/18/2020  Authorization Period Expiration: 12/31/2020  Plan of Care Expiration: 12/31/2020  Visit # / Visits authorized: 3     Time In: 1430  Time Out: 1515  Total Billable Time: 38 minutes     Precautions: Standard    Subjective     Pt reports: having no dizziness this afternoon and worked on HEP for 3 days prior to thanksgiving. Left inner ear ringing on/off.   She was compliant with home exercise program.  Response to previous treatment: too soon to tell  Functional change: walking with no vertigo.    Pain: 0/10  Location: none    Objective     Huma participated in neuromuscular re-education activities to improve: Balance and vestibular for 40 minutes. The following activities were included:  Posture:cervical protraction     Oculomotor:  Spontaneous Nystagmus    -  Smooth Pursuits         -  Saccades        -  Convergence    -     VOR:  Head Thrust:  Mildly + right side saccade     Static Balance:   Romberg(EC 30") + mild swaying  Single Leg Stance: (EO 30") 0     Dynamic Balance:  CTSIB: + moderate swaying, no loss of balance     Special Testing: BPPV  [place]-Hallpike Test:(Anterior or Posterior Canal) -  (f/b Epley Maneuver if +)     Roll Test: Horizontal Canal BPPV -    Seated cervical retraction: 1/20", f/b 1/10 2" hold    Seated: VOR (horizontal)  30", 1', 2'  -progressed to     VOR cancellation:  30", 1', 2'    Standing: VOR (horizontal)  30", 1', 2'  -progressed to VOR (horizontal) 3 x 2'  -ambulatory: 26 ft x 8 (horizontal head turns)    Home Exercises Provided and Patient Education Provided     Education provided:   - Yes    Written Home Exercises " Provided: Patient instructed to cont prior HEP.  Exercises were reviewed and Huma was able to demonstrate them prior to the end of the session.  Huma demonstrated good  understanding of the education provided.     See EMR under Patient Instructions for exercises provided prior visit.    Assessment   Patient demonstrated good progression with ambulatory tasks (VOR-horizontal) with mild deviation x 2 without loss of balance. No adverse effects.    Huma is progressing well towards her goals.   Pt prognosis is Good.     Pt will continue to benefit from skilled outpatient physical therapy to address the deficits listed in the problem list box on initial evaluation, provide pt/family education and to maximize pt's level of independence in the home and community environment.     Pt's spiritual, cultural and educational needs considered and pt agreeable to plan of care and goals.    Anticipated barriers to physical therapy: none    Goals:   Short Term GOALS:  In 4 weeks, pt. will:  - demonstrate seated VOR up to 2 minutes tolerance for ADL purposes.  - report 25% reduction in dizziness with ambulatory/home management tasks.  - decrease outcome measure limitation to <48%     Long Term GOALS:  In 6 weeks, pt. will:  - be independent and compliant with HEP and SX management   - decrease outcome measure limitation to <40%  - return to home management independently with > 50% reduction in dizziness episodes.  - demonstrate standing VOR up to 2 minutes tolerance for ADL purposes.     Plan     Continue POC    Cricket Morgan, PT

## 2020-12-07 ENCOUNTER — CLINICAL SUPPORT (OUTPATIENT)
Dept: REHABILITATION | Facility: HOSPITAL | Age: 70
End: 2020-12-07
Payer: MEDICARE

## 2020-12-07 DIAGNOSIS — R42 DIZZINESS: Primary | ICD-10-CM

## 2020-12-07 PROCEDURE — 97112 NEUROMUSCULAR REEDUCATION: CPT | Mod: PO | Performed by: PHYSICAL THERAPIST

## 2020-12-07 NOTE — PROGRESS NOTES
"  Physical Therapy Daily Treatment Note     Name: Herminia Wooten  Clinic Number: [id number]    Therapy Diagnosis:   Encounter Diagnosis   Name Primary?    Dizziness Yes     Physician: Meredith Gonsalves NP    Visit Date: 12/7/2020  Physician Orders: PT Eval and Treat   Medical Diagnosis from Referral:   Left-sided vestibular weakness      Evaluation Date: 11/18/2020  Authorization Period Expiration: 12/31/2020  Plan of Care Expiration: 12/31/2020  Visit # / Visits authorized: 4     Time In: 1430  Time Out: 1515  Total Billable Time: 38 minutes     Precautions: Standard    Subjective     Pt reports: having no new s/s and has been busy with [employer]. Patient reported driving with no difficulty.  She was compliant with home exercise program.  Response to previous treatment: too soon to tell  Functional change: walking with no vertigo.    Pain: 0/10  Location: none    Objective     Huma participated in neuromuscular re-education activities to improve: Balance and vestibular for 40 minutes. The following activities were included:  Posture:cervical protraction     Oculomotor:  Spontaneous Nystagmus    -  Smooth Pursuits         -  Saccades        -  Convergence    -     VOR:  Head Thrust:  Mildly + right side saccade     Static Balance:   Romberg(EC 30") + mild swaying  Single Leg Stance: (EO 30") 0     Dynamic Balance:  CTSIB: + moderate swaying, no loss of balance     Special Testing: BPPV  [place]-Hallpike Test:(Anterior or Posterior Canal) -  (f/b Epley Maneuver if +)     Roll Test: Horizontal Canal BPPV -    Seated cervical retraction: 1/20", f/b 1/10 2" hold    Seated: VOR (horizontal) (previous)  30", 1', 2'    VOR cancellation:  30", 1', 2'    Standing: VOR (horizontal)  2", 2', 2' on airex    Standing: airex/tandem EO to EC x 3 1'    -progressed to VOR (horizontal) 3 x 2'  -ambulatory: 26 ft x 10 (horizontal head turns)    Home Exercises Provided and Patient Education Provided     Education provided:   - " Yes    Written Home Exercises Provided: Patient instructed to cont prior HEP.  Exercises were reviewed and Huma was able to demonstrate them prior to the end of the session.  Huma demonstrated good  understanding of the education provided.     See EMR under Patient Instructions for exercises provided prior visit.    Assessment   Good tolerance with VOR progression up to 2'. Standing tandem on airex EO to EC noted with difficulty but was able to sustain postural balance by the 3rd trial. No loss of balance with some swaying noted. No adverse effects.    Huma is progressing well towards her goals.   Pt prognosis is Good.     Pt will continue to benefit from skilled outpatient physical therapy to address the deficits listed in the problem list box on initial evaluation, provide pt/family education and to maximize pt's level of independence in the home and community environment.     Pt's spiritual, cultural and educational needs considered and pt agreeable to plan of care and goals.    Anticipated barriers to physical therapy: none    Goals:   Short Term GOALS:  In 4 weeks, pt. will:  - demonstrate seated VOR up to 2 minutes tolerance for ADL purposes.  - report 25% reduction in dizziness with ambulatory/home management tasks.  - decrease outcome measure limitation to <48%     Long Term GOALS:  In 6 weeks, pt. will:  - be independent and compliant with HEP and SX management   - decrease outcome measure limitation to <40%  - return to home management independently with > 50% reduction in dizziness episodes.  - demonstrate standing VOR up to 2 minutes tolerance for ADL purposes.     Plan     Continue POC    Cricket Morgan, PT

## 2020-12-14 ENCOUNTER — CLINICAL SUPPORT (OUTPATIENT)
Dept: REHABILITATION | Facility: HOSPITAL | Age: 70
End: 2020-12-14
Payer: MEDICARE

## 2020-12-14 DIAGNOSIS — R42 DIZZINESS: Primary | ICD-10-CM

## 2020-12-14 PROCEDURE — 97112 NEUROMUSCULAR REEDUCATION: CPT | Mod: PO | Performed by: PHYSICAL THERAPIST

## 2020-12-14 NOTE — PROGRESS NOTES
"  Physical Therapy Daily Treatment Note     Name: Herminia Wooten  Clinic Number: [id number]    Therapy Diagnosis:   Encounter Diagnosis   Name Primary?    Dizziness Yes     Physician: Meredith Gonsalves NP    Visit Date: 12/14/2020  Physician Orders: PT Eval and Treat   Medical Diagnosis from Referral:   Left-sided vestibular weakness      Evaluation Date: 11/18/2020  Authorization Period Expiration: 12/31/2020  Plan of Care Expiration: 12/31/2020  Visit # / Visits authorized: 5     Time In: 1440  Time Out: 1515  Total Billable Time: 30 minutes     Precautions: Standard    Subjective     Pt reports: working on HEP and has been rushing with shopping lately. No new s/s. No vertigo.  She was compliant with home exercise program.  Response to previous treatment:   Functional change: walking with no vertigo    Pain: 0/10  Location: none    Objective     Huma participated in neuromuscular re-education activities to improve: Balance and vestibular for 30 minutes. The following activities were included:  Posture:cervical protraction     Oculomotor:  Spontaneous Nystagmus    -  Smooth Pursuits         -  Saccades        -  Convergence    -     VOR:  Head Thrust:  Mildly + right side saccade     Static Balance:   Romberg(EC 30") + mild swaying  Single Leg Stance: (EO 30") 0     Dynamic Balance:  CTSIB: + moderate swaying, no loss of balance     Special Testing: BPPV  [place]-Hallpike Test:(Anterior or Posterior Canal) -  (f/b Epley Maneuver if +)     Roll Test: Horizontal Canal BPPV -    Seated cervical retraction: 1/20", f/b 1/10 2" hold    Seated: VOR (horizontal) (previous)  30", 1', 2'    VOR cancellation:  30", 1', 2'    Standing: VOR (horizontal)  2", 2', 2' on airex    Standing: airex/tandem EO to EC x 3 1', progressed to tandem    -progressed to VOR (horizontal) 3 x 2'  -ambulatory: 26 ft x 10 (horizontal head turns)    Home Exercises Provided and Patient Education Provided     Education provided:   - Yes    Written Home " Exercises Provided: Patient instructed to cont prior HEP.  Exercises were reviewed and Huma was able to demonstrate them prior to the end of the session.  Huma demonstrated good  understanding of the education provided.     See EMR under Patient Instructions for exercises provided prior visit.    Assessment   Patient demonstrated independence with standing on level ground VOR and proceeded with standing on airex/tandem VOR/horizontal head turns. No adverse effects.    Huma is progressing well towards her goals.   Pt prognosis is Good.     Pt will continue to benefit from skilled outpatient physical therapy to address the deficits listed in the problem list box on initial evaluation, provide pt/family education and to maximize pt's level of independence in the home and community environment.     Pt's spiritual, cultural and educational needs considered and pt agreeable to plan of care and goals.    Anticipated barriers to physical therapy: none    Goals:   Short Term GOALS:  In 4 weeks, pt. will:  - demonstrate seated VOR up to 2 minutes tolerance for ADL purposes.  - report 25% reduction in dizziness with ambulatory/home management tasks.  - decrease outcome measure limitation to <48%     Long Term GOALS:  In 6 weeks, pt. will:  - be independent and compliant with HEP and SX management   - decrease outcome measure limitation to <40%  - return to home management independently with > 50% reduction in dizziness episodes.  - demonstrate standing VOR up to 2 minutes tolerance for ADL purposes.     Plan     Continue POC    Cricket Morgan, PT

## 2020-12-21 ENCOUNTER — CLINICAL SUPPORT (OUTPATIENT)
Dept: REHABILITATION | Facility: HOSPITAL | Age: 70
End: 2020-12-21
Payer: MEDICARE

## 2020-12-21 DIAGNOSIS — R42 DIZZINESS: Primary | ICD-10-CM

## 2020-12-21 PROCEDURE — 97112 NEUROMUSCULAR REEDUCATION: CPT | Mod: PO | Performed by: PHYSICAL THERAPIST

## 2020-12-21 NOTE — PROGRESS NOTES
"  Physical Therapy Daily Treatment Note     Name: Herminia Wooten  Clinic Number: [id number]    Therapy Diagnosis:   Encounter Diagnosis   Name Primary?    Dizziness Yes     Physician: Meredith Gonsalves NP    Visit Date: 12/21/2020  Physician Orders: PT Eval and Treat   Medical Diagnosis from Referral:   Left-sided vestibular weakness      Evaluation Date: 11/18/2020  Authorization Period Expiration: 12/31/2020  Plan of Care Expiration: 12/31/2020  Visit # / Visits authorized: 6     Time In: 1430  Time Out: 1505  Total Billable Time: 30 minutes     Precautions: Standard    Subjective     Pt reports: not having any vertigo and has been doing well since last session.  She was compliant with home exercise program.  Response to previous treatment: no vertigo  Functional change: walking with no vertigo    Pain: 0/10  Location: none    Objective     Huma participated in neuromuscular re-education activities to improve: Balance and vestibular for 30 minutes. The following activities were included:  Posture:cervical protraction     Oculomotor:  Spontaneous Nystagmus    -  Smooth Pursuits         -  Saccades        -  Convergence    -     VOR:  Head Thrust:  Mildly + right side saccade     Static Balance:   Romberg(EC 30") + mild swaying  Single Leg Stance: (EO 30") 0     Dynamic Balance:  CTSIB: + moderate swaying, no loss of balance     Special Testing: BPPV  [place]-Hallpike Test:(Anterior or Posterior Canal) -  (f/b Epley Maneuver if +)     Roll Test: Horizontal Canal BPPV -    Seated cervical retraction: 1/20", f/b 1/10 2" hold    Seated: VOR (horizontal) (previous)  30", 1', 2'    VOR cancellation:  30", 1', 2'    Standing: VOR (horizontal)  2", 2', 2' on airex    Standing: airex/tandem EO to EC x 3 1', progressed to tandem (previous)   VOR (horizontal) 3 x 2'    -ambulatory: 26 ft x 10 (horizontal head turns)    Home Exercises Provided and Patient Education Provided     Education provided:   - Yes    Written Home Exercises " Provided: Patient instructed to cont prior HEP.  Exercises were reviewed and Huma was able to demonstrate them prior to the end of the session.  Huma demonstrated good  understanding of the education provided.     See EMR under Patient Instructions for exercises provided prior visit.    Assessment   Good tolerance with TE progression noted. No dizziness/vertigo noted.    Huma is progressing well towards her goals.   Pt prognosis is Good.     Pt will continue to benefit from skilled outpatient physical therapy to address the deficits listed in the problem list box on initial evaluation, provide pt/family education and to maximize pt's level of independence in the home and community environment.     Pt's spiritual, cultural and educational needs considered and pt agreeable to plan of care and goals.    Anticipated barriers to physical therapy: none    Goals:   Short Term GOALS:  In 4 weeks, pt. will:  - demonstrate seated VOR up to 2 minutes tolerance for ADL purposes.  - report 25% reduction in dizziness with ambulatory/home management tasks.  - decrease outcome measure limitation to <48%     Long Term GOALS:  In 6 weeks, pt. will:  - be independent and compliant with HEP and SX management   - decrease outcome measure limitation to <40%  - return to home management independently with > 50% reduction in dizziness episodes.  - demonstrate standing VOR up to 2 minutes tolerance for ADL purposes.     Plan     Continue POC    Cricket Morgan, PT

## 2020-12-22 NOTE — TELEPHONE ENCOUNTER
Advised pt no tub baths and to take stool softener as directed to help with constipation.    Suture Removal: 14 days

## 2020-12-28 ENCOUNTER — DOCUMENTATION ONLY (OUTPATIENT)
Dept: REHABILITATION | Facility: HOSPITAL | Age: 70
End: 2020-12-28

## 2020-12-28 PROBLEM — R42 DIZZINESS: Status: RESOLVED | Noted: 2020-11-18 | Resolved: 2020-12-28

## 2020-12-28 NOTE — PROGRESS NOTES
Outpatient Therapy Discharge Summary     Name: Herminia Wooten  Clinic Number: [id number]  Physician Orders: PT Eval and Treat   Medical Diagnosis from Referral:   Left-sided vestibular weakness      Evaluation Date: 11/18/2020  Authorization Period Expiration: 12/31/2020  Plan of Care Expiration: 12/31/2020  Visit # / Visits authorized: 6  Total Visits Received: 6  Cancelled Visits: 0  No Show Visits: 0    Assessment    Goals: Met  Short Term GOALS:  In 4 weeks, pt. will:  - demonstrate seated VOR up to 2 minutes tolerance for ADL purposes. Met  - report 25% reduction in dizziness with ambulatory/home management tasks. Met  - decrease outcome measure limitation to <48%. Met     Long Term GOALS:  In 6 weeks, pt. will:  - be independent and compliant with HEP and SX management. Met  - decrease outcome measure limitation to <40%. Met  - return to home management independently with > 50% reduction in dizziness episodes. Met  - demonstrate standing VOR up to 2 minutes tolerance for ADL purposes.Met    Discharge reason: Patient is now asymptomatic, Patient has met all of his/her goals and Patient requested discharge    Plan   This patient is discharged from Physical Therapy

## 2021-01-11 ENCOUNTER — IMMUNIZATION (OUTPATIENT)
Dept: FAMILY MEDICINE | Facility: CLINIC | Age: 71
End: 2021-01-11
Payer: MEDICARE

## 2021-01-11 DIAGNOSIS — Z23 NEED FOR VACCINATION: ICD-10-CM

## 2021-01-11 PROCEDURE — 91300 COVID-19, MRNA, LNP-S, PF, 30 MCG/0.3 ML DOSE VACCINE: CPT | Mod: PBBFAC | Performed by: FAMILY MEDICINE

## 2021-01-26 ENCOUNTER — TELEPHONE (OUTPATIENT)
Dept: OBSTETRICS AND GYNECOLOGY | Facility: CLINIC | Age: 71
End: 2021-01-26

## 2021-02-01 ENCOUNTER — IMMUNIZATION (OUTPATIENT)
Dept: FAMILY MEDICINE | Facility: CLINIC | Age: 71
End: 2021-02-01
Payer: MEDICARE

## 2021-02-01 DIAGNOSIS — Z23 NEED FOR VACCINATION: Primary | ICD-10-CM

## 2021-02-01 PROCEDURE — 91300 COVID-19, MRNA, LNP-S, PF, 30 MCG/0.3 ML DOSE VACCINE: CPT | Mod: PBBFAC | Performed by: FAMILY MEDICINE

## 2021-02-01 PROCEDURE — 0002A COVID-19, MRNA, LNP-S, PF, 30 MCG/0.3 ML DOSE VACCINE: CPT | Mod: PBBFAC | Performed by: FAMILY MEDICINE

## 2021-02-08 ENCOUNTER — CLINICAL SUPPORT (OUTPATIENT)
Dept: OBSTETRICS AND GYNECOLOGY | Facility: CLINIC | Age: 71
End: 2021-02-08
Payer: MEDICARE

## 2021-02-08 ENCOUNTER — TELEPHONE (OUTPATIENT)
Dept: OBSTETRICS AND GYNECOLOGY | Facility: CLINIC | Age: 71
End: 2021-02-08

## 2021-02-08 DIAGNOSIS — R39.9 UTI SYMPTOMS: Primary | ICD-10-CM

## 2021-02-08 PROCEDURE — 87077 CULTURE AEROBIC IDENTIFY: CPT

## 2021-02-08 PROCEDURE — 87086 URINE CULTURE/COLONY COUNT: CPT

## 2021-02-08 PROCEDURE — 87186 SC STD MICRODIL/AGAR DIL: CPT

## 2021-02-08 PROCEDURE — 87088 URINE BACTERIA CULTURE: CPT

## 2021-02-09 ENCOUNTER — PATIENT MESSAGE (OUTPATIENT)
Dept: OBSTETRICS AND GYNECOLOGY | Facility: CLINIC | Age: 71
End: 2021-02-09

## 2021-02-10 ENCOUNTER — PATIENT MESSAGE (OUTPATIENT)
Dept: OBSTETRICS AND GYNECOLOGY | Facility: CLINIC | Age: 71
End: 2021-02-10

## 2021-02-10 RX ORDER — NITROFURANTOIN 25; 75 MG/1; MG/1
100 CAPSULE ORAL 2 TIMES DAILY
Qty: 14 CAPSULE | Refills: 0 | Status: SHIPPED | OUTPATIENT
Start: 2021-02-10 | End: 2021-02-17

## 2021-02-11 LAB — BACTERIA UR CULT: ABNORMAL

## 2021-02-14 ENCOUNTER — PATIENT MESSAGE (OUTPATIENT)
Dept: OBSTETRICS AND GYNECOLOGY | Facility: CLINIC | Age: 71
End: 2021-02-14

## 2021-02-24 ENCOUNTER — OFFICE VISIT (OUTPATIENT)
Dept: OBSTETRICS AND GYNECOLOGY | Facility: CLINIC | Age: 71
End: 2021-02-24
Payer: MEDICARE

## 2021-02-24 VITALS
WEIGHT: 177.5 LBS | BODY MASS INDEX: 35.78 KG/M2 | HEIGHT: 59 IN | RESPIRATION RATE: 18 BRPM | SYSTOLIC BLOOD PRESSURE: 138 MMHG | DIASTOLIC BLOOD PRESSURE: 84 MMHG

## 2021-02-24 DIAGNOSIS — L90.0 LICHEN SCLEROSUS ET ATROPHICUS: ICD-10-CM

## 2021-02-24 DIAGNOSIS — N95.2 ATROPHIC VAGINITIS: ICD-10-CM

## 2021-02-24 DIAGNOSIS — R39.9 UTI SYMPTOMS: Primary | ICD-10-CM

## 2021-02-24 LAB
BILIRUB SERPL-MCNC: NORMAL MG/DL
BLOOD URINE, POC: NORMAL
CLARITY, POC UA: NORMAL
COLOR, POC UA: NORMAL
GLUCOSE UR QL STRIP: NORMAL
KETONES UR QL STRIP: NORMAL
LEUKOCYTE ESTERASE URINE, POC: NORMAL
NITRITE, POC UA: NORMAL
PH, POC UA: 6
PROTEIN, POC: NORMAL
SPECIFIC GRAVITY, POC UA: NORMAL
UROBILINOGEN, POC UA: NORMAL

## 2021-02-24 PROCEDURE — 3008F BODY MASS INDEX DOCD: CPT | Mod: CPTII,S$GLB,, | Performed by: OBSTETRICS & GYNECOLOGY

## 2021-02-24 PROCEDURE — 1101F PR PT FALLS ASSESS DOC 0-1 FALLS W/OUT INJ PAST YR: ICD-10-PCS | Mod: CPTII,S$GLB,, | Performed by: OBSTETRICS & GYNECOLOGY

## 2021-02-24 PROCEDURE — 81002 URINALYSIS NONAUTO W/O SCOPE: CPT | Mod: S$GLB,,, | Performed by: OBSTETRICS & GYNECOLOGY

## 2021-02-24 PROCEDURE — 3288F PR FALLS RISK ASSESSMENT DOCUMENTED: ICD-10-PCS | Mod: CPTII,S$GLB,, | Performed by: OBSTETRICS & GYNECOLOGY

## 2021-02-24 PROCEDURE — 3008F PR BODY MASS INDEX (BMI) DOCUMENTED: ICD-10-PCS | Mod: CPTII,S$GLB,, | Performed by: OBSTETRICS & GYNECOLOGY

## 2021-02-24 PROCEDURE — G0101 CA SCREEN;PELVIC/BREAST EXAM: HCPCS | Mod: S$GLB,,, | Performed by: OBSTETRICS & GYNECOLOGY

## 2021-02-24 PROCEDURE — 99999 PR PBB SHADOW E&M-EST. PATIENT-LVL III: ICD-10-PCS | Mod: PBBFAC,,, | Performed by: OBSTETRICS & GYNECOLOGY

## 2021-02-24 PROCEDURE — 81002 POCT URINE DIPSTICK WITHOUT MICROSCOPE: ICD-10-PCS | Mod: S$GLB,,, | Performed by: OBSTETRICS & GYNECOLOGY

## 2021-02-24 PROCEDURE — 99999 PR PBB SHADOW E&M-EST. PATIENT-LVL III: CPT | Mod: PBBFAC,,, | Performed by: OBSTETRICS & GYNECOLOGY

## 2021-02-24 PROCEDURE — 3288F FALL RISK ASSESSMENT DOCD: CPT | Mod: CPTII,S$GLB,, | Performed by: OBSTETRICS & GYNECOLOGY

## 2021-02-24 PROCEDURE — 1101F PT FALLS ASSESS-DOCD LE1/YR: CPT | Mod: CPTII,S$GLB,, | Performed by: OBSTETRICS & GYNECOLOGY

## 2021-02-24 PROCEDURE — 87086 URINE CULTURE/COLONY COUNT: CPT

## 2021-02-24 PROCEDURE — G0101 PR CA SCREEN;PELVIC/BREAST EXAM: ICD-10-PCS | Mod: S$GLB,,, | Performed by: OBSTETRICS & GYNECOLOGY

## 2021-02-26 LAB — BACTERIA UR CULT: NO GROWTH

## 2021-04-20 ENCOUNTER — TELEPHONE (OUTPATIENT)
Dept: GASTROENTEROLOGY | Facility: CLINIC | Age: 71
End: 2021-04-20

## 2021-04-20 DIAGNOSIS — R19.7 DIARRHEA, UNSPECIFIED TYPE: Primary | ICD-10-CM

## 2021-04-21 ENCOUNTER — TELEPHONE (OUTPATIENT)
Dept: GASTROENTEROLOGY | Facility: CLINIC | Age: 71
End: 2021-04-21

## 2021-04-27 ENCOUNTER — TELEPHONE (OUTPATIENT)
Dept: OTOLARYNGOLOGY | Facility: CLINIC | Age: 71
End: 2021-04-27

## 2021-05-20 ENCOUNTER — TELEPHONE (OUTPATIENT)
Dept: GASTROENTEROLOGY | Facility: CLINIC | Age: 71
End: 2021-05-20

## 2021-06-15 ENCOUNTER — LAB VISIT (OUTPATIENT)
Dept: LAB | Facility: HOSPITAL | Age: 71
End: 2021-06-15
Payer: MEDICARE

## 2021-06-15 ENCOUNTER — TELEPHONE (OUTPATIENT)
Dept: RADIOLOGY | Facility: HOSPITAL | Age: 71
End: 2021-06-15

## 2021-06-15 DIAGNOSIS — R19.7 DIARRHEA, UNSPECIFIED TYPE: ICD-10-CM

## 2021-06-15 PROCEDURE — 87427 SHIGA-LIKE TOXIN AG IA: CPT | Performed by: NURSE PRACTITIONER

## 2021-06-15 PROCEDURE — 87329 GIARDIA AG IA: CPT | Performed by: NURSE PRACTITIONER

## 2021-06-15 PROCEDURE — 89055 LEUKOCYTE ASSESSMENT FECAL: CPT | Performed by: NURSE PRACTITIONER

## 2021-06-15 PROCEDURE — 87798 DETECT AGENT NOS DNA AMP: CPT | Performed by: NURSE PRACTITIONER

## 2021-06-15 PROCEDURE — 87046 STOOL CULTR AEROBIC BACT EA: CPT | Mod: 59 | Performed by: NURSE PRACTITIONER

## 2021-06-15 PROCEDURE — 83986 ASSAY PH BODY FLUID NOS: CPT | Performed by: NURSE PRACTITIONER

## 2021-06-15 PROCEDURE — 87209 SMEAR COMPLEX STAIN: CPT | Performed by: NURSE PRACTITIONER

## 2021-06-15 PROCEDURE — 87045 FECES CULTURE AEROBIC BACT: CPT | Performed by: NURSE PRACTITIONER

## 2021-06-15 PROCEDURE — 87425 ROTAVIRUS AG IA: CPT | Performed by: NURSE PRACTITIONER

## 2021-06-16 ENCOUNTER — TELEPHONE (OUTPATIENT)
Dept: GASTROENTEROLOGY | Facility: CLINIC | Age: 71
End: 2021-06-16

## 2021-06-16 LAB
CRYPTOSP AG STL QL IA: NEGATIVE
G LAMBLIA AG STL QL IA: NEGATIVE
RV AG STL QL IA.RAPID: NEGATIVE
WBC #/AREA STL HPF: NORMAL /[HPF]

## 2021-06-17 LAB
E COLI SXT1 STL QL IA: NEGATIVE
E COLI SXT2 STL QL IA: NEGATIVE
O+P STL MICRO: NORMAL

## 2021-06-18 ENCOUNTER — TELEPHONE (OUTPATIENT)
Dept: GASTROENTEROLOGY | Facility: CLINIC | Age: 71
End: 2021-06-18

## 2021-06-18 LAB
HADV DNA SERPL QL NAA+PROBE: NEGATIVE
PH STL: NORMAL [PH]
SPECIMEN SOURCE: NORMAL

## 2021-06-19 LAB — BACTERIA STL CULT: NORMAL

## 2021-06-24 ENCOUNTER — HOSPITAL ENCOUNTER (OUTPATIENT)
Dept: RADIOLOGY | Facility: HOSPITAL | Age: 71
Discharge: HOME | End: 2021-06-24
Attending: INTERNAL MEDICINE
Payer: MEDICARE

## 2021-06-24 DIAGNOSIS — N64.4 MASTODYNIA: ICD-10-CM

## 2021-06-24 PROCEDURE — 76642 ULTRASOUND BREAST LIMITED: CPT | Mod: TC,PO,LT

## 2021-06-24 PROCEDURE — 76642 US BREAST LEFT LIMITED: ICD-10-PCS | Mod: 26,LT,, | Performed by: RADIOLOGY

## 2021-06-24 PROCEDURE — 77065 MAMMO DIGITAL DIAGNOSTIC LEFT WITH TOMO: ICD-10-PCS | Mod: 26,LT,, | Performed by: RADIOLOGY

## 2021-06-24 PROCEDURE — 77061 BREAST TOMOSYNTHESIS UNI: CPT | Mod: 26,LT,, | Performed by: RADIOLOGY

## 2021-06-24 PROCEDURE — 77061 MAMMO DIGITAL DIAGNOSTIC LEFT WITH TOMO: ICD-10-PCS | Mod: 26,LT,, | Performed by: RADIOLOGY

## 2021-06-24 PROCEDURE — 76642 ULTRASOUND BREAST LIMITED: CPT | Mod: 26,LT,, | Performed by: RADIOLOGY

## 2021-06-24 PROCEDURE — 77065 DX MAMMO INCL CAD UNI: CPT | Mod: 26,LT,, | Performed by: RADIOLOGY

## 2021-06-24 PROCEDURE — 77061 BREAST TOMOSYNTHESIS UNI: CPT | Mod: TC,PO,LT

## 2021-06-28 RX ORDER — ESTRADIOL 0.1 MG/G
CREAM VAGINAL
Qty: 42.5 G | Refills: 1 | Status: SHIPPED | OUTPATIENT
Start: 2021-06-28 | End: 2021-12-09

## 2021-06-29 ENCOUNTER — OFFICE VISIT (OUTPATIENT)
Dept: OTOLARYNGOLOGY | Facility: CLINIC | Age: 71
End: 2021-06-29
Payer: MEDICARE

## 2021-06-29 VITALS — HEIGHT: 59 IN | BODY MASS INDEX: 34.94 KG/M2 | WEIGHT: 173.31 LBS

## 2021-06-29 DIAGNOSIS — H61.23 BILATERAL IMPACTED CERUMEN: Primary | ICD-10-CM

## 2021-06-29 DIAGNOSIS — H93.8X1 PRESSURE SENSATION IN EAR, RIGHT: ICD-10-CM

## 2021-06-29 PROCEDURE — 3288F PR FALLS RISK ASSESSMENT DOCUMENTED: ICD-10-PCS | Mod: CPTII,S$GLB,, | Performed by: NURSE PRACTITIONER

## 2021-06-29 PROCEDURE — 99999 PR PBB SHADOW E&M-EST. PATIENT-LVL III: ICD-10-PCS | Mod: PBBFAC,,, | Performed by: NURSE PRACTITIONER

## 2021-06-29 PROCEDURE — 1159F PR MEDICATION LIST DOCUMENTED IN MEDICAL RECORD: ICD-10-PCS | Mod: S$GLB,,, | Performed by: NURSE PRACTITIONER

## 2021-06-29 PROCEDURE — 1101F PR PT FALLS ASSESS DOC 0-1 FALLS W/OUT INJ PAST YR: ICD-10-PCS | Mod: CPTII,S$GLB,, | Performed by: NURSE PRACTITIONER

## 2021-06-29 PROCEDURE — 1159F MED LIST DOCD IN RCRD: CPT | Mod: S$GLB,,, | Performed by: NURSE PRACTITIONER

## 2021-06-29 PROCEDURE — 69210 REMOVE IMPACTED EAR WAX UNI: CPT | Mod: S$GLB,,, | Performed by: NURSE PRACTITIONER

## 2021-06-29 PROCEDURE — 3008F PR BODY MASS INDEX (BMI) DOCUMENTED: ICD-10-PCS | Mod: CPTII,S$GLB,, | Performed by: NURSE PRACTITIONER

## 2021-06-29 PROCEDURE — 1126F AMNT PAIN NOTED NONE PRSNT: CPT | Mod: S$GLB,,, | Performed by: NURSE PRACTITIONER

## 2021-06-29 PROCEDURE — 3008F BODY MASS INDEX DOCD: CPT | Mod: CPTII,S$GLB,, | Performed by: NURSE PRACTITIONER

## 2021-06-29 PROCEDURE — 69210 PR REMOVAL IMPACTED CERUMEN REQUIRING INSTRUMENTATION, UNILATERAL: ICD-10-PCS | Mod: S$GLB,,, | Performed by: NURSE PRACTITIONER

## 2021-06-29 PROCEDURE — 1101F PT FALLS ASSESS-DOCD LE1/YR: CPT | Mod: CPTII,S$GLB,, | Performed by: NURSE PRACTITIONER

## 2021-06-29 PROCEDURE — 99213 OFFICE O/P EST LOW 20 MIN: CPT | Mod: 25,S$GLB,, | Performed by: NURSE PRACTITIONER

## 2021-06-29 PROCEDURE — 1126F PR PAIN SEVERITY QUANTIFIED, NO PAIN PRESENT: ICD-10-PCS | Mod: S$GLB,,, | Performed by: NURSE PRACTITIONER

## 2021-06-29 PROCEDURE — 99213 PR OFFICE/OUTPT VISIT, EST, LEVL III, 20-29 MIN: ICD-10-PCS | Mod: 25,S$GLB,, | Performed by: NURSE PRACTITIONER

## 2021-06-29 PROCEDURE — 3288F FALL RISK ASSESSMENT DOCD: CPT | Mod: CPTII,S$GLB,, | Performed by: NURSE PRACTITIONER

## 2021-06-29 PROCEDURE — 99999 PR PBB SHADOW E&M-EST. PATIENT-LVL III: CPT | Mod: PBBFAC,,, | Performed by: NURSE PRACTITIONER

## 2022-02-17 DIAGNOSIS — H81.10 BENIGN PAROXYSMAL POSITIONAL VERTIGO, UNSPECIFIED LATERALITY: Primary | ICD-10-CM

## 2022-02-24 ENCOUNTER — OFFICE VISIT (OUTPATIENT)
Dept: URGENT CARE | Facility: CLINIC | Age: 72
End: 2022-02-24
Payer: MEDICARE

## 2022-02-24 VITALS
OXYGEN SATURATION: 97 % | HEIGHT: 59 IN | TEMPERATURE: 98 F | WEIGHT: 173 LBS | RESPIRATION RATE: 16 BRPM | HEART RATE: 70 BPM | DIASTOLIC BLOOD PRESSURE: 78 MMHG | SYSTOLIC BLOOD PRESSURE: 173 MMHG | BODY MASS INDEX: 34.88 KG/M2

## 2022-02-24 DIAGNOSIS — U07.1 COVID-19 VIRUS INFECTION: Primary | ICD-10-CM

## 2022-02-24 DIAGNOSIS — U07.1 COVID-19 VIRUS DETECTED: ICD-10-CM

## 2022-02-24 DIAGNOSIS — R09.81 SINUS CONGESTION: ICD-10-CM

## 2022-02-24 LAB
CTP QC/QA: YES
SARS-COV-2 RDRP RESP QL NAA+PROBE: POSITIVE

## 2022-02-24 PROCEDURE — U0002: ICD-10-PCS | Mod: QW,S$GLB,, | Performed by: PHYSICIAN ASSISTANT

## 2022-02-24 PROCEDURE — 1159F PR MEDICATION LIST DOCUMENTED IN MEDICAL RECORD: ICD-10-PCS | Mod: CPTII,S$GLB,, | Performed by: PHYSICIAN ASSISTANT

## 2022-02-24 PROCEDURE — 99203 OFFICE O/P NEW LOW 30 MIN: CPT | Mod: S$GLB,,, | Performed by: PHYSICIAN ASSISTANT

## 2022-02-24 PROCEDURE — 3078F DIAST BP <80 MM HG: CPT | Mod: CPTII,S$GLB,, | Performed by: PHYSICIAN ASSISTANT

## 2022-02-24 PROCEDURE — 3077F SYST BP >= 140 MM HG: CPT | Mod: CPTII,S$GLB,, | Performed by: PHYSICIAN ASSISTANT

## 2022-02-24 PROCEDURE — 3008F BODY MASS INDEX DOCD: CPT | Mod: CPTII,S$GLB,, | Performed by: PHYSICIAN ASSISTANT

## 2022-02-24 PROCEDURE — 1160F RVW MEDS BY RX/DR IN RCRD: CPT | Mod: CPTII,S$GLB,, | Performed by: PHYSICIAN ASSISTANT

## 2022-02-24 PROCEDURE — 3008F PR BODY MASS INDEX (BMI) DOCUMENTED: ICD-10-PCS | Mod: CPTII,S$GLB,, | Performed by: PHYSICIAN ASSISTANT

## 2022-02-24 PROCEDURE — 3077F PR MOST RECENT SYSTOLIC BLOOD PRESSURE >= 140 MM HG: ICD-10-PCS | Mod: CPTII,S$GLB,, | Performed by: PHYSICIAN ASSISTANT

## 2022-02-24 PROCEDURE — 3078F PR MOST RECENT DIASTOLIC BLOOD PRESSURE < 80 MM HG: ICD-10-PCS | Mod: CPTII,S$GLB,, | Performed by: PHYSICIAN ASSISTANT

## 2022-02-24 PROCEDURE — U0002 COVID-19 LAB TEST NON-CDC: HCPCS | Mod: QW,S$GLB,, | Performed by: PHYSICIAN ASSISTANT

## 2022-02-24 PROCEDURE — 1160F PR REVIEW ALL MEDS BY PRESCRIBER/CLIN PHARMACIST DOCUMENTED: ICD-10-PCS | Mod: CPTII,S$GLB,, | Performed by: PHYSICIAN ASSISTANT

## 2022-02-24 PROCEDURE — 1159F MED LIST DOCD IN RCRD: CPT | Mod: CPTII,S$GLB,, | Performed by: PHYSICIAN ASSISTANT

## 2022-02-24 PROCEDURE — 99203 PR OFFICE/OUTPT VISIT, NEW, LEVL III, 30-44 MIN: ICD-10-PCS | Mod: S$GLB,,, | Performed by: PHYSICIAN ASSISTANT

## 2022-02-24 RX ORDER — METHYLPREDNISOLONE 4 MG/1
TABLET ORAL
COMMUNITY
Start: 2022-02-22 | End: 2022-02-28

## 2022-02-24 NOTE — PROGRESS NOTES
"Subjective:       Patient ID: Herminia Wooten is a 71 y.o. female.    Vitals:  height is 4' 11" (1.499 m) and weight is 78.5 kg (173 lb). Her temperature is 98.2 °F (36.8 °C). Her blood pressure is 173/78 (abnormal) and her pulse is 70. Her respiration is 16 and oxygen saturation is 97%.     Chief Complaint: Sinus Problem    Patient presents to urgent care with sore throat and sinus congestion x 3-4 days. Patient reports that she has another doctor's appointment tomorrow and wants to make sure she doesn't have COVID-19. Patient is vaccinated against COVID-19. Patient reports that she started taking steroids this morning for a back problem. Patient currently denies fever, chills, body aches, CP, SOB, wheezing, abdominal pain, N/V/D/C, headache, blurry vision, dizziness or syncope.     Other  This is a new problem. The current episode started yesterday. The problem occurs constantly. The problem has been gradually worsening. Associated symptoms include congestion, coughing and a sore throat. Pertinent negatives include no abdominal pain, anorexia, arthralgias, change in bowel habit, chest pain, chills, diaphoresis, fatigue, fever, headaches, joint swelling, myalgias, nausea, neck pain, numbness, rash, swollen glands, urinary symptoms, visual change, vomiting or weakness. Nothing aggravates the symptoms. Treatments tried: steroids. The treatment provided no relief.       Constitution: Negative for chills, sweating, fatigue and fever.   HENT: Positive for congestion, postnasal drip, sinus pressure and sore throat. Negative for ear pain, drooling, nosebleeds, foreign body in nose, sinus pain, trouble swallowing and voice change.    Neck: Negative for neck pain, neck stiffness, painful lymph nodes and neck swelling.   Cardiovascular: Negative for chest pain, leg swelling, palpitations, sob on exertion and passing out.   Eyes: Negative for eye pain, eye redness, photophobia, double vision, blurred vision and eyelid " swelling.   Respiratory: Positive for cough. Negative for chest tightness, sputum production, bloody sputum, shortness of breath, stridor and wheezing.    Gastrointestinal: Negative for abdominal pain, abdominal bloating, nausea, vomiting, constipation, diarrhea and heartburn.   Genitourinary: Negative for dysuria, flank pain, hematuria and pelvic pain.   Musculoskeletal: Negative for joint pain, joint swelling, abnormal ROM of joint, back pain, muscle cramps and muscle ache.   Skin: Negative for rash and hives.   Allergic/Immunologic: Negative for seasonal allergies, food allergies, hives, itching and sneezing.   Neurological: Negative for dizziness, light-headedness, passing out, facial drooping, speech difficulty, loss of balance, headaches, altered mental status, loss of consciousness, numbness, tingling and seizures.   Hematologic/Lymphatic: Negative for swollen lymph nodes.   Psychiatric/Behavioral: Negative for altered mental status and nervous/anxious. The patient is not nervous/anxious.        Objective:      Physical Exam   Constitutional: She is oriented to person, place, and time. She appears well-developed. She is cooperative.  Non-toxic appearance. She does not appear ill. No distress.   HENT:   Head: Normocephalic and atraumatic.   Ears:   Right Ear: Hearing, tympanic membrane, external ear and ear canal normal.   Left Ear: Hearing, tympanic membrane, external ear and ear canal normal.   Nose: Mucosal edema and rhinorrhea present. No nasal deformity. No epistaxis. Right sinus exhibits no maxillary sinus tenderness and no frontal sinus tenderness. Left sinus exhibits no maxillary sinus tenderness and no frontal sinus tenderness.   Mouth/Throat: Uvula is midline and mucous membranes are normal. No trismus in the jaw. Normal dentition. No uvula swelling. Posterior oropharyngeal erythema and cobblestoning present. No oropharyngeal exudate, posterior oropharyngeal edema or tonsillar abscesses. No tonsillar  exudate.   Eyes: Conjunctivae and lids are normal. No scleral icterus.   Neck: Trachea normal and phonation normal. Neck supple. No edema present. No erythema present. No neck rigidity present.   Cardiovascular: Normal rate, regular rhythm, normal heart sounds and normal pulses.   Pulmonary/Chest: Effort normal and breath sounds normal. No accessory muscle usage or stridor. No respiratory distress. She has no decreased breath sounds. She has no wheezes. She has no rhonchi. She has no rales.   Abdominal: Normal appearance.   Musculoskeletal: Normal range of motion.         General: No deformity. Normal range of motion.   Lymphadenopathy:     She has no cervical adenopathy.   Neurological: She is alert and oriented to person, place, and time. She exhibits normal muscle tone. Coordination normal.   Skin: Skin is warm, dry, intact, not diaphoretic, not pale and no rash. Capillary refill takes less than 2 seconds.   Psychiatric: Her speech is normal and behavior is normal. Judgment and thought content normal.   Nursing note and vitals reviewed.        Results for orders placed or performed in visit on 02/24/22   POCT COVID-19 Rapid Screening   Result Value Ref Range    POC Rapid COVID Positive (A) Negative     Acceptable Yes        Assessment:       1. COVID-19 virus infection    2. Sinus congestion          Plan:     Discussed COVID-19 results with patient. Advised close follow-up with PCP and/or Specialist for further evaluation as needed. ER precautions given to patient as well. Patient aware, verbalized understanding and agreed with plan of care.    COVID-19 virus infection    Sinus congestion  -     POCT COVID-19 Rapid Screening      Patient Instructions   You must understand that you've received an Urgent Care treatment only and that you may be released before all your medical problems are known or treated. You, the patient, will arrange for follow up care as instructed.  Follow up with your PCP or  specialty clinic as directed if not improved or as needed. You can call [phone] to schedule an appointment with the appropriate provider.  If your condition worsens we recommend that you receive another evaluation at the Emergency Department for any concerns or worsening of condition.  Patient aware and verbalized understanding.    Reviewed COVID-19 results with patient.  Counseled patient and answered questions in regards to COVID-19 testing.  Advised patient to go home, treat symptoms with over-the-counter (OTC) medications and avoid contact with others at this time.  Increase fluids and rest is important.  Humidifier use at home.  OTC Claritin or Zyrtec or Allegra daily as needed for nasal congestion/postnasal drip/allergies.  OTC Flonase Nasal Spray daily as needed for nasal congestion/postnasal drip/allergies.  Advised patient to take OTC VITAMIN C and VITAMIN D and ZINC unless contraindicated as discussed.  Alternate OTC Tylenol and Ibuprofen unless contraindicated every 4-6 hours as needed for pain, headache, fever, etc.  Info given for virtual visit, covid 19 information line, state info line.   Advised patient to follow-up with PCP and/or Specialist for further evaluation as needed.   Strict ER precautions given to patient.  Follow local/state guidelines per covid emergency.   Patient aware, verbalized understanding and agreed with plan of care.    CDC RECOMMENDATIONS  --IF test results are NEGATIVE and NO known high risk exposure to covid-19 virus, you can be excluded from work/school until:  o MINIMUM OF 24 hours fever-free without the use of fever-reducing medications AND  o Improvement in symptoms (e.g. cough, shortness of breath, fatigue, GI symptoms, etc)     --IF YOU ARE BEING TESTED BECAUSE OF A HIGH RISK EXPOSURE, which the CDC defines as direct contact 6 feet or less for >15 minutes with a known positive person, you should follow CDC guidelines as well as your employer/school protocols for  safely returning to work/school.   *Please be aware that there are False Negative possibilities with testing, so you should return to work/school based upon CDC guidelines, not simply a negative result, unless your employer/school has a different RTW protocol/guidance for you.     --IF test results are POSITIVE , you should be excluded from work/school until:  o At least 24 hours FEVER-FREE without the use of fever-reducing medications AND  o Improvement in symptoms (e.g., cough, shortness of breathing, fatigue, GI symptoms, etc) AND  o At least 5 days have passed since symptoms first appeared.    IF NOT IMPROVING, FOLLOW UP WITH VIRTUAL ONLINE VISIT WITH A PROVIDER 24/7 - FOR MORE INFORMATION OR TO DOWNLOAD THE FCO, VISIT [facility] ANYWHERE [email or web address]/ANYWHERE  FOR 24/7 NURSE ADVICE, CALL [phone]  FOR COVID 19 RELATED QUESTIONS, CALL the [facility] covid hotline: [phone]  [place] FOR UP TO DATE INFORMATION: Text or dial 211, test keyword [id number] OR DIAL 211    HELPFUL EXTERNAL RESOURCES:  OFFICE OF PUBLIC HEALTH: [place] - http://ldh.[place].gov/ and 1-608.371.9247  [facility] CONTR[facility] - https://www.cdc.gov/   WORLD HEALTH ORGANIZATION (WHO) - https://www.who.int/   CDC WHEN TO QUARANTINE - https://www.cdc.gov/coronavirus/2019-ncov/if-you-are-sick/quarantine.html     INFO ABOUT [facility] COVID-19 RAPID TESTING:  This test utilizes isothermal nucleic acid amplification technology to detect the SARS-CoV-2 RdRp nucleic acid segment.   The analytical sensitivity (limit of detection) is 125 genome equivalents/mL.   A POSITIVE result implies infection with the SARS-CoV-2 virus; the patient is presumed to be contagious.     A NEGATIVE result means that SARS-CoV-2 nucleic acids are not present above the limit of detection.   A NEGATIVE result should be treated as presumptive. It does not rule out the possibility of COVID-19 and should not be the sole basis for treatment decisions.   This test  is only for use under the Food and Drug Administration s Emergency Use Authorization (EUA).   Commercial kits are provided by [employer]. Performance characteristics of the EUA have been independently verified by [facility] Department of Pathology and Laboratory Medicine.   _________________________________________________________________   The authorized Fact Sheet for Healthcare Providers and the authorized Fact Sheet for Patients of the ID NOW COVID-19 are available on the FDA website:   https://www.fda.gov/media/462393/download  https://www.fda.gov/media/122503/download

## 2022-02-24 NOTE — PATIENT INSTRUCTIONS
You must understand that you've received an Urgent Care treatment only and that you may be released before all your medical problems are known or treated. You, the patient, will arrange for follow up care as instructed.  Follow up with your PCP or specialty clinic as directed if not improved or as needed. You can call [phone] to schedule an appointment with the appropriate provider.  If your condition worsens we recommend that you receive another evaluation at the Emergency Department for any concerns or worsening of condition.  Patient aware and verbalized understanding.    Reviewed COVID-19 results with patient.  Counseled patient and answered questions in regards to COVID-19 testing.  Advised patient to go home, treat symptoms with over-the-counter (OTC) medications and avoid contact with others at this time.  Increase fluids and rest is important.  Humidifier use at home.  D/C Steroids at this time as discussed.  Info given for virtual visit, covid 19 information line, state info line.   Advised patient to follow-up with PCP and/or Specialist for further evaluation as needed.   Strict ER precautions given to patient.  Follow local/state guidelines per covid emergency.   Patient aware, verbalized understanding and agreed with plan of care.    CDC RECOMMENDATIONS  --IF test results are NEGATIVE and NO known high risk exposure to covid-19 virus, you can be excluded from work/school until:  o MINIMUM OF 24 hours fever-free without the use of fever-reducing medications AND  o Improvement in symptoms (e.g. cough, shortness of breath, fatigue, GI symptoms, etc)     --IF YOU ARE BEING TESTED BECAUSE OF A HIGH RISK EXPOSURE, which the CDC defines as direct contact 6 feet or less for >15 minutes with a known positive person, you should follow CDC guidelines as well as your employer/school protocols for safely returning to work/school.   *Please be aware that there are False Negative possibilities with testing, so you  should return to work/school based upon CDC guidelines, not simply a negative result, unless your employer/school has a different RTW protocol/guidance for you.     --IF test results are POSITIVE , you should be excluded from work/school until:  o At least 24 hours FEVER-FREE without the use of fever-reducing medications AND  o Improvement in symptoms (e.g., cough, shortness of breathing, fatigue, GI symptoms, etc) AND  o At least 5 days have passed since symptoms first appeared.    IF NOT IMPROVING, FOLLOW UP WITH VIRTUAL ONLINE VISIT WITH A PROVIDER 24/7 - FOR MORE INFORMATION OR TO DOWNLOAD THE FCO, VISIT [facility] ANYWHERE CARE AT [email or web address]/ANYWHERE  FOR 24/7 NURSE ADVICE, CALL [phone]  FOR COVID 19 RELATED QUESTIONS, CALL the [facility] covid hotline: [phone]  [place] FOR UP TO DATE INFORMATION: Text or dial 211, test keyword LACOVID [id number] OR DIAL 211    HELPFUL EXTERNAL RESOURCES:  OFFICE OF PUBLIC HEALTH: [place] - http://ldh.[place].gov/ and 1-152.493.4012  [facility] - https://www.cdc.gov/   WORLD HEALTH ORGANIZATION (WHO) - https://www.who.int/   CDC WHEN TO QUARANTINE - https://www.cdc.gov/coronavirus/2019-ncov/if-you-are-sick/quarantine.html     INFO ABOUT [facility] COVID-19 RAPID TESTING:  This test utilizes isothermal nucleic acid amplification technology to detect the SARS-CoV-2 RdRp nucleic acid segment.   The analytical sensitivity (limit of detection) is 125 genome equivalents/mL.   A POSITIVE result implies infection with the SARS-CoV-2 virus; the patient is presumed to be contagious.     A NEGATIVE result means that SARS-CoV-2 nucleic acids are not present above the limit of detection.   A NEGATIVE result should be treated as presumptive. It does not rule out the possibility of COVID-19 and should not be the sole basis for treatment decisions.   This test is only for use under the Food and Drug Administration s Emergency Use Authorization (EUA).   Commercial kits are  provided by [employer]. Performance characteristics of the EUA have been independently verified by [facility] Department of Pathology and Laboratory Medicine.   _________________________________________________________________   The authorized Fact Sheet for Healthcare Providers and the authorized Fact Sheet for Patients of the ID NOW COVID-19 are available on the FDA website:   https://www.fda.gov/media/219200/download  https://www.fda.gov/media/578755/download

## 2022-06-14 ENCOUNTER — HOSPITAL ENCOUNTER (OUTPATIENT)
Dept: RADIOLOGY | Facility: HOSPITAL | Age: 72
Discharge: HOME | End: 2022-06-14
Attending: SURGERY
Payer: MEDICARE

## 2022-06-14 DIAGNOSIS — J44.89 OBSTRUCTIVE CHRONIC BRONCHITIS WITHOUT EXACERBATION: ICD-10-CM

## 2022-06-14 PROCEDURE — 71046 X-RAY EXAM CHEST 2 VIEWS: CPT | Mod: 26,,, | Performed by: RADIOLOGY

## 2022-06-14 PROCEDURE — 71046 XR CHEST PA AND LATERAL: ICD-10-PCS | Mod: 26,,, | Performed by: RADIOLOGY

## 2022-06-14 PROCEDURE — 71046 X-RAY EXAM CHEST 2 VIEWS: CPT | Mod: TC,FY,PO

## 2022-06-23 ENCOUNTER — HOSPITAL ENCOUNTER (OUTPATIENT)
Dept: RADIOLOGY | Facility: HOSPITAL | Age: 72
Discharge: HOME | End: 2022-06-23
Attending: SURGERY
Payer: MEDICARE

## 2022-06-23 DIAGNOSIS — J44.89 OBSTRUCTIVE CHRONIC BRONCHITIS WITHOUT EXACERBATION: ICD-10-CM

## 2022-06-23 DIAGNOSIS — R10.0 ACUTE ABDOMEN: ICD-10-CM

## 2022-06-23 DIAGNOSIS — R16.0 HEPATOMEGALY: ICD-10-CM

## 2022-06-23 PROCEDURE — 25500020 PHARM REV CODE 255: Mod: PO | Performed by: SURGERY

## 2022-06-23 PROCEDURE — 74177 CT ABD & PELVIS W/CONTRAST: CPT | Mod: 26,,, | Performed by: RADIOLOGY

## 2022-06-23 PROCEDURE — 74177 CT ABD & PELVIS W/CONTRAST: CPT | Mod: TC,PO

## 2022-06-23 PROCEDURE — A9698 NON-RAD CONTRAST MATERIALNOC: HCPCS | Mod: PO | Performed by: SURGERY

## 2022-06-23 PROCEDURE — 74177 CT ABDOMEN PELVIS WITH CONTRAST: ICD-10-PCS | Mod: 26,,, | Performed by: RADIOLOGY

## 2022-06-23 RX ADMIN — BARIUM SULFATE 900 ML: 20 SUSPENSION ORAL at 11:06

## 2022-06-23 RX ADMIN — IOHEXOL 75 ML: 350 INJECTION, SOLUTION INTRAVENOUS at 10:06

## 2022-07-05 ENCOUNTER — HOSPITAL ENCOUNTER (OUTPATIENT)
Dept: RADIOLOGY | Facility: HOSPITAL | Age: 72
Discharge: HOME | End: 2022-07-05
Attending: OBSTETRICS & GYNECOLOGY
Payer: MEDICARE

## 2022-07-05 ENCOUNTER — OFFICE VISIT (OUTPATIENT)
Dept: OBSTETRICS AND GYNECOLOGY | Facility: CLINIC | Age: 72
End: 2022-07-05
Payer: MEDICARE

## 2022-07-05 VITALS
SYSTOLIC BLOOD PRESSURE: 122 MMHG | DIASTOLIC BLOOD PRESSURE: 62 MMHG | WEIGHT: 173.06 LBS | BODY MASS INDEX: 34.95 KG/M2

## 2022-07-05 DIAGNOSIS — Z12.31 ENCOUNTER FOR SCREENING MAMMOGRAM FOR MALIGNANT NEOPLASM OF BREAST: ICD-10-CM

## 2022-07-05 DIAGNOSIS — Z12.31 ENCOUNTER FOR SCREENING MAMMOGRAM FOR MALIGNANT NEOPLASM OF BREAST: Primary | ICD-10-CM

## 2022-07-05 PROCEDURE — 77063 BREAST TOMOSYNTHESIS BI: CPT | Mod: TC,PN

## 2022-07-05 PROCEDURE — 1101F PT FALLS ASSESS-DOCD LE1/YR: CPT | Mod: CPTII,S$GLB,, | Performed by: OBSTETRICS & GYNECOLOGY

## 2022-07-05 PROCEDURE — 77067 MAMMO DIGITAL SCREENING BILAT WITH TOMO: ICD-10-PCS | Mod: 26,,, | Performed by: RADIOLOGY

## 2022-07-05 PROCEDURE — 3008F PR BODY MASS INDEX (BMI) DOCUMENTED: ICD-10-PCS | Mod: CPTII,S$GLB,, | Performed by: OBSTETRICS & GYNECOLOGY

## 2022-07-05 PROCEDURE — 99999 PR PBB SHADOW E&M-EST. PATIENT-LVL III: CPT | Mod: PBBFAC,,, | Performed by: OBSTETRICS & GYNECOLOGY

## 2022-07-05 PROCEDURE — 77063 MAMMO DIGITAL SCREENING BILAT WITH TOMO: ICD-10-PCS | Mod: 26,,, | Performed by: RADIOLOGY

## 2022-07-05 PROCEDURE — 77067 SCR MAMMO BI INCL CAD: CPT | Mod: TC,PN

## 2022-07-05 PROCEDURE — G0101 PR CA SCREEN;PELVIC/BREAST EXAM: ICD-10-PCS | Mod: S$GLB,,, | Performed by: OBSTETRICS & GYNECOLOGY

## 2022-07-05 PROCEDURE — G0101 CA SCREEN;PELVIC/BREAST EXAM: HCPCS | Mod: S$GLB,,, | Performed by: OBSTETRICS & GYNECOLOGY

## 2022-07-05 PROCEDURE — 1159F MED LIST DOCD IN RCRD: CPT | Mod: CPTII,S$GLB,, | Performed by: OBSTETRICS & GYNECOLOGY

## 2022-07-05 PROCEDURE — 4010F ACE/ARB THERAPY RXD/TAKEN: CPT | Mod: CPTII,S$GLB,, | Performed by: OBSTETRICS & GYNECOLOGY

## 2022-07-05 PROCEDURE — 3008F BODY MASS INDEX DOCD: CPT | Mod: CPTII,S$GLB,, | Performed by: OBSTETRICS & GYNECOLOGY

## 2022-07-05 PROCEDURE — 3288F PR FALLS RISK ASSESSMENT DOCUMENTED: ICD-10-PCS | Mod: CPTII,S$GLB,, | Performed by: OBSTETRICS & GYNECOLOGY

## 2022-07-05 PROCEDURE — 3074F SYST BP LT 130 MM HG: CPT | Mod: CPTII,S$GLB,, | Performed by: OBSTETRICS & GYNECOLOGY

## 2022-07-05 PROCEDURE — 3078F DIAST BP <80 MM HG: CPT | Mod: CPTII,S$GLB,, | Performed by: OBSTETRICS & GYNECOLOGY

## 2022-07-05 PROCEDURE — 3074F PR MOST RECENT SYSTOLIC BLOOD PRESSURE < 130 MM HG: ICD-10-PCS | Mod: CPTII,S$GLB,, | Performed by: OBSTETRICS & GYNECOLOGY

## 2022-07-05 PROCEDURE — 3288F FALL RISK ASSESSMENT DOCD: CPT | Mod: CPTII,S$GLB,, | Performed by: OBSTETRICS & GYNECOLOGY

## 2022-07-05 PROCEDURE — 1159F PR MEDICATION LIST DOCUMENTED IN MEDICAL RECORD: ICD-10-PCS | Mod: CPTII,S$GLB,, | Performed by: OBSTETRICS & GYNECOLOGY

## 2022-07-05 PROCEDURE — 4010F PR ACE/ARB THEARPY RXD/TAKEN: ICD-10-PCS | Mod: CPTII,S$GLB,, | Performed by: OBSTETRICS & GYNECOLOGY

## 2022-07-05 PROCEDURE — 99999 PR PBB SHADOW E&M-EST. PATIENT-LVL III: ICD-10-PCS | Mod: PBBFAC,,, | Performed by: OBSTETRICS & GYNECOLOGY

## 2022-07-05 PROCEDURE — 77063 BREAST TOMOSYNTHESIS BI: CPT | Mod: 26,,, | Performed by: RADIOLOGY

## 2022-07-05 PROCEDURE — 77067 SCR MAMMO BI INCL CAD: CPT | Mod: 26,,, | Performed by: RADIOLOGY

## 2022-07-05 PROCEDURE — 1101F PR PT FALLS ASSESS DOC 0-1 FALLS W/OUT INJ PAST YR: ICD-10-PCS | Mod: CPTII,S$GLB,, | Performed by: OBSTETRICS & GYNECOLOGY

## 2022-07-05 PROCEDURE — 3078F PR MOST RECENT DIASTOLIC BLOOD PRESSURE < 80 MM HG: ICD-10-PCS | Mod: CPTII,S$GLB,, | Performed by: OBSTETRICS & GYNECOLOGY

## 2022-07-05 RX ORDER — LISINOPRIL 10 MG/1
10 TABLET ORAL DAILY
COMMUNITY
Start: 2022-06-16

## 2022-07-05 RX ORDER — ESTRADIOL 0.1 MG/G
2 CREAM VAGINAL
Qty: 42.5 G | Refills: 3 | Status: SHIPPED | OUTPATIENT
Start: 2022-07-07 | End: 2023-07-31

## 2022-07-05 NOTE — PROGRESS NOTES
Chief Complaint   Patient presents with    Well Woman     Well woman exam       History and Physical:  Patient's last menstrual period was 10/07/1976.       Herminia Wooten is a 71 y.o.  female who presents today for her routine annual GYN exam. The patient has no Gynecology complaints today. Doing well on twice weekly estrogen cream      Allergies:   Review of patient's allergies indicates:   Allergen Reactions    Codeine Other (See Comments)     nervousness    Caffeine Anxiety     In medications    Percodan [oxycodone hcl-oxycodone-asa] Anxiety       Past Medical History:   Diagnosis Date    Anesthesia complication     elevated BP    Colon polyp     Coronary artery disease     Diverticulitis     Diverticulosis     Encounter for blood transfusion     Fatty liver 4/25/2016    GERD (gastroesophageal reflux disease)     Hyperlipidemia     Hypertension     Hypothyroid     Irritable bowel syndrome     Kidney stone     Liver hemangioma     Seasonal allergies     Thyroid disease        Past Surgical History:   Procedure Laterality Date    ABDOMINAL SURGERY      APPENDECTOMY      CARDIAC CATHETERIZATION      2 stents    CHOLECYSTECTOMY      COLECTOMY      endometriosis- 8.5 inches removed    COLONOSCOPY  2016    repeat in 5    COLONOSCOPY N/A 6/27/2016    Procedure: COLONOSCOPY;  Surgeon: Avel Luna Jr., MD;  Location: [facility];  Service: Endoscopy;  Laterality: N/A; repeat in 5 years for surveillance    COLONOSCOPY N/A 7/27/2020    Procedure: COLONOSCOPY;  Surgeon: Avel Luna Jr., MD;  Location: [facility];  Service: Endoscopy;  Laterality: N/A; Irritable bowel syndrome(?). hemorrhoids, 2 colon polyps removed, diverticulosis, repeat in 5 years for surveillance. biopsy: Hyperplastic polyp    CORONARY ANGIOPLASTY WITH STENT PLACEMENT      x 2    ESOPHAGOGASTRODUODENOSCOPY N/A 7/27/2020    Procedure: EGD (ESOPHAGOGASTRODUODENOSCOPY);  Surgeon: Avel Luna Jr., MD;   Location: [facility];  Service: Endoscopy;  Laterality: N/A; unremarkable; biopsy: stomach- Mild chronic gastritis, negative for h pylori. duodenum WNL    HYSTERECTOMY      INCONTINENCE SURGERY      INJECTION OF ANESTHETIC AGENT INTO SACROILIAC JOINT Right 7/3/2019    Procedure: BLOCK, SACROILIAC JOINT;  Surgeon: Homer Diamond MD;  Location: [facility];  Service: Pain Management;  Laterality: Right;    INJECTION OF FACET JOINT Right 8/28/2019    Procedure: INJECTION, FACET JOINT;  Surgeon: Homer Diamond MD;  Location: [facility];  Service: Pain Management;  Laterality: Right;  L4 and L5    JOINT REPLACEMENT Right     Knee    OOPHORECTOMY      rectocele  05/2017    repair    Stent OM      UPPER GASTROINTESTINAL ENDOSCOPY  6/2013 Dr. Peters    reflux esophagitis; biopsy: negative dysplasia, intestinal metaplasia; mild chronic inflammation- GERD related & regenerative glandular epithelial change; strips of squamous esophageal mucosa with moderate basal epithelial cell hyperplasia and rare intraepithelial eosinophils (< 1 per 10 high power fields)    UPPER GASTROINTESTINAL ENDOSCOPY  06/2016    Dr. de la cruz    URETHRA SURGERY         MEDS:   Current Outpatient Medications on File Prior to Visit   Medication Sig Dispense Refill    aluminum & magnesium hydroxide-simethicone (MYLANTA MAX STRENGTH) 400-400-40 mg/5 mL suspension Take by mouth every 6 (six) hours as needed for Indigestion.      atenolol (TENORMIN) 25 MG tablet Take 25 mg by mouth every evening.      atorvastatin (LIPITOR) 20 MG tablet Take 20 mg by mouth once daily.      augmented betamethasone (DIPROLENE) 0.05 % gel FCO THIN FILM AA 4 TO 6 TIMES D. DO NOT USE FOR LONGER THAN 14 DAYS  1    betamethasone valerate 0.1% (VALISONE) 0.1 % Crea Apply 1 application topically daily as needed.       cholecalciferol, vitamin D3, (VITAMIN D3) 50 mcg (2,000 unit) Tab Take 1 tablet by mouth once daily.       clobetasol (TEMOVATE) 0.05 % cream APPLY  1/2 GRAM TOPICALLY twice weekly 30 g 1    estradioL (ESTRACE) 0.01 % (0.1 mg/gram) vaginal cream PLACE 1 GRAM VAGINALLY 2 TIMES A WEEK 42.5 g 1    FLOVENT  mcg/actuation inhaler INHALE 1 PUFF PO Q 12 H  4    fluticasone (FLONASE) 50 mcg/actuation nasal spray 1 spray by Each Nare route 2 (two) times daily as needed.       ibuprofen (ADVIL,MOTRIN) 100 MG tablet Take 100 mg by mouth every 6 (six) hours as needed for Temperature greater than.      levothyroxine (SYNTHROID) 25 MCG tablet Take 25 mcg by mouth once daily.       lisinopriL 10 MG tablet Take 10 mg by mouth once daily.      lorazepam (ATIVAN) 0.5 MG tablet Take 0.5 mg by mouth every 12 (twelve) hours as needed.      losartan (COZAAR) 50 MG tablet Take 100 mg by mouth once daily.       mometasone 220 mcg (30 doses) inhaler Inhale 2 puffs into the lungs daily as needed.       montelukast (SINGULAIR) 10 mg tablet       polyethylene glycol (GLYCOLAX) 17 gram PwPk Take 17 g by mouth daily as needed.       omeprazole (PRILOSEC) 40 MG capsule Take 1 capsule (40 mg total) by mouth once daily. (Patient taking differently: Take 40 mg by mouth daily as needed. ) 90 capsule 2     No current facility-administered medications on file prior to visit.       OB History        1    Para   1    Term   1            AB        Living           SAB        IAB        Ectopic        Multiple        Live Births                     Social History     Socioeconomic History    Marital status:    Tobacco Use    Smoking status: Former Smoker     Packs/day: 0.25     Years: 35.00     Pack years: 8.75     Quit date: 10/7/1999     Years since quittin.7    Smokeless tobacco: Never Used   Substance and Sexual Activity    Alcohol use: No    Drug use: No    Sexual activity: Yes     Partners: Male     Birth control/protection: See Surgical Hx       Family History   Problem Relation Age of Onset    Clotting disorder Maternal Grandmother      Diverticulitis Maternal Grandfather     Anesthesia problems Neg Hx     Breast cancer Neg Hx     Ovarian cancer Neg Hx     Colon cancer Neg Hx     Colon polyps Neg Hx     Crohn's disease Neg Hx     Ulcerative colitis Neg Hx          Past medical and surgical history reviewed.   I have reviewed the patient's medical history in detail and updated the computerized patient record.        Review of System:   General: no chills, fever, night sweats, weight gain or weight loss  Psychological: no depression or suicidal ideation  Breasts: no new or changing breast lumps, nipple discharge or masses.  Respiratory: no cough, shortness of breath, or wheezing  Cardiovascular: no chest pain or dyspnea on exertion  Gastrointestinal: no abdominal pain, change in bowel habits, or black or bloody stools  Genito-Urinary: no incontinence, urinary frequency/urgency or vulvar/vaginal symptoms, pelvic pain or abnormal vaginal bleeding.  Musculoskeletal: no gait disturbance or muscular weakness      Physical Exam:   /62   Wt 78.5 kg (173 lb 1 oz)   LMP 10/07/1976   BMI 34.95 kg/m²   Constitutional: She is oriented to person, place, and time. She appears well-developed and well-nourished. No distress.OverWeight  HENT:   Head: Normocephalic and atraumatic.   Eyes: Conjunctivae and EOM are normal. No scleral icterus.   Neck: Normal range of motion. Neck supple. No tracheal deviation present.   Cardiovascular: Normal rate.    Pulmonary/Chest: Effort normal. No respiratory distress. She exhibits no tenderness.  Breasts: are symmetrical.   Right breast exhibits no inverted nipple, no mass, no nipple discharge, no skin change and no tenderness.   Left breast exhibits no inverted nipple, no mass, no nipple discharge, no skin change and no tenderness.  Abdominal: Soft. She exhibits no distension and no mass. There is no tenderness. There is no rebound and no guarding.   Genitourinary:    External rectal exam shows no thrombosed  external hemorrhoids.    Pelvic exam was performed with patient supine.   No labial fusion.   There is no rash, lesion or injury on the right labia.   There is no rash, lesion or injury on the left labia.   No bleeding and no signs of injury around the vaginal introitus, urethra is without lesions and well supported.    No vaginal discharge found. pale   No significant Cystocele, Enterocele or rectocele, and cuff well supported.   Bimanual exam:   The urethra and vagina are without palpable masses or tenderness.   Uterus and cervix are surgically absents, vaginal cuff is intact and well supported.   Right adnexum displays no mass and no tenderness.   Left adnexum displays no mass and no tenderness.  Musculoskeletal: Normal range of motion.   Lymphadenopathy: No inguinal adenopathy present.   Neurological: She is alert and oriented to person, place, and time. Coordination normal.   Skin: Skin is warm and dry. She is not diaphoretic.   Psychiatric: She has a normal mood and affect.        Assessment:   Normal annual GYN exam  1. Encounter for screening mammogram for malignant neoplasm of breast  Mammo Digital Screening Bilat w/ [facility]       Plan:   PAP Not Needed  Mammogram today  Refill estrogne cream  Follow up in 1 year.

## 2022-07-26 DIAGNOSIS — L23.9 ALLERGIC CONTACT DERMATITIS, UNSPECIFIED TRIGGER: ICD-10-CM

## 2022-07-26 DIAGNOSIS — L90.0 LICHEN SCLEROSUS ET ATROPHICUS: ICD-10-CM

## 2022-07-26 RX ORDER — CLOBETASOL PROPIONATE 0.5 MG/G
CREAM TOPICAL
Qty: 30 G | Refills: 1 | Status: SHIPPED | OUTPATIENT
Start: 2022-07-26 | End: 2024-02-21 | Stop reason: SDUPTHER

## 2022-07-26 NOTE — TELEPHONE ENCOUNTER
----- Message from Carolyn Fernandez sent at 7/26/2022  2:19 PM CDT -----  Type:  RX Refill Request    Who Called: Pt    Refill or New Rx: Refill     RX Name and Strength:clobetasol (TEMOVATE) 0.05 % cream    Preferred Pharmacy with phone number:[id number] [place] [address] AT [facility] [address]    Local or Mail Order:Local     Ordering Provider: Tomasz Hummel MD    Best Call Back Number:[phone]

## 2023-04-20 ENCOUNTER — OFFICE VISIT (OUTPATIENT)
Dept: GASTROENTEROLOGY | Facility: CLINIC | Age: 73
End: 2023-04-20
Payer: MEDICARE

## 2023-04-20 ENCOUNTER — OFFICE VISIT (OUTPATIENT)
Dept: OPTOMETRY | Facility: CLINIC | Age: 73
End: 2023-04-20
Payer: MEDICARE

## 2023-04-20 VITALS — HEIGHT: 59 IN | BODY MASS INDEX: 34.53 KG/M2 | WEIGHT: 171.31 LBS

## 2023-04-20 DIAGNOSIS — H04.123 DRY EYE SYNDROME OF BILATERAL LACRIMAL GLANDS: Primary | ICD-10-CM

## 2023-04-20 DIAGNOSIS — R12 HEARTBURN: ICD-10-CM

## 2023-04-20 DIAGNOSIS — Z87.19 HISTORY OF GASTROESOPHAGEAL REFLUX (GERD): ICD-10-CM

## 2023-04-20 DIAGNOSIS — D18.03 LIVER HEMANGIOMA: ICD-10-CM

## 2023-04-20 DIAGNOSIS — Z87.19 HISTORY OF CHRONIC CONSTIPATION: ICD-10-CM

## 2023-04-20 DIAGNOSIS — K76.0 HEPATIC STEATOSIS: ICD-10-CM

## 2023-04-20 DIAGNOSIS — R10.12 LUQ PAIN: Primary | ICD-10-CM

## 2023-04-20 DIAGNOSIS — H25.13 AGE-RELATED NUCLEAR CATARACT, BILATERAL: ICD-10-CM

## 2023-04-20 DIAGNOSIS — R10.13 EPIGASTRIC PAIN: ICD-10-CM

## 2023-04-20 DIAGNOSIS — R74.01 ELEVATED ALT MEASUREMENT: ICD-10-CM

## 2023-04-20 DIAGNOSIS — H10.13 ALLERGIC CONJUNCTIVITIS OF BOTH EYES: ICD-10-CM

## 2023-04-20 PROCEDURE — 1159F PR MEDICATION LIST DOCUMENTED IN MEDICAL RECORD: ICD-10-PCS | Mod: CPTII,S$GLB,, | Performed by: NURSE PRACTITIONER

## 2023-04-20 PROCEDURE — 1101F PR PT FALLS ASSESS DOC 0-1 FALLS W/OUT INJ PAST YR: ICD-10-PCS | Mod: CPTII,S$GLB,,

## 2023-04-20 PROCEDURE — 3288F FALL RISK ASSESSMENT DOCD: CPT | Mod: CPTII,S$GLB,, | Performed by: NURSE PRACTITIONER

## 2023-04-20 PROCEDURE — 99999 PR PBB SHADOW E&M-EST. PATIENT-LVL III: CPT | Mod: PBBFAC,,,

## 2023-04-20 PROCEDURE — 4010F PR ACE/ARB THEARPY RXD/TAKEN: ICD-10-PCS | Mod: CPTII,S$GLB,, | Performed by: NURSE PRACTITIONER

## 2023-04-20 PROCEDURE — 3288F PR FALLS RISK ASSESSMENT DOCUMENTED: ICD-10-PCS | Mod: CPTII,S$GLB,, | Performed by: NURSE PRACTITIONER

## 2023-04-20 PROCEDURE — 1126F PR PAIN SEVERITY QUANTIFIED, NO PAIN PRESENT: ICD-10-PCS | Mod: CPTII,S$GLB,,

## 2023-04-20 PROCEDURE — 1160F PR REVIEW ALL MEDS BY PRESCRIBER/CLIN PHARMACIST DOCUMENTED: ICD-10-PCS | Mod: CPTII,S$GLB,, | Performed by: NURSE PRACTITIONER

## 2023-04-20 PROCEDURE — 4010F ACE/ARB THERAPY RXD/TAKEN: CPT | Mod: CPTII,S$GLB,,

## 2023-04-20 PROCEDURE — 1126F AMNT PAIN NOTED NONE PRSNT: CPT | Mod: CPTII,S$GLB,,

## 2023-04-20 PROCEDURE — 1159F MED LIST DOCD IN RCRD: CPT | Mod: CPTII,S$GLB,,

## 2023-04-20 PROCEDURE — 99999 PR PBB SHADOW E&M-EST. PATIENT-LVL III: ICD-10-PCS | Mod: PBBFAC,,, | Performed by: NURSE PRACTITIONER

## 2023-04-20 PROCEDURE — 4010F ACE/ARB THERAPY RXD/TAKEN: CPT | Mod: CPTII,S$GLB,, | Performed by: NURSE PRACTITIONER

## 2023-04-20 PROCEDURE — 1101F PT FALLS ASSESS-DOCD LE1/YR: CPT | Mod: CPTII,S$GLB,,

## 2023-04-20 PROCEDURE — 1159F MED LIST DOCD IN RCRD: CPT | Mod: CPTII,S$GLB,, | Performed by: NURSE PRACTITIONER

## 2023-04-20 PROCEDURE — 99203 OFFICE O/P NEW LOW 30 MIN: CPT | Mod: S$GLB,,,

## 2023-04-20 PROCEDURE — 1160F RVW MEDS BY RX/DR IN RCRD: CPT | Mod: CPTII,S$GLB,, | Performed by: NURSE PRACTITIONER

## 2023-04-20 PROCEDURE — 99214 PR OFFICE/OUTPT VISIT, EST, LEVL IV, 30-39 MIN: ICD-10-PCS | Mod: S$GLB,,, | Performed by: NURSE PRACTITIONER

## 2023-04-20 PROCEDURE — 3008F PR BODY MASS INDEX (BMI) DOCUMENTED: ICD-10-PCS | Mod: CPTII,S$GLB,, | Performed by: NURSE PRACTITIONER

## 2023-04-20 PROCEDURE — 3008F BODY MASS INDEX DOCD: CPT | Mod: CPTII,S$GLB,, | Performed by: NURSE PRACTITIONER

## 2023-04-20 PROCEDURE — 4010F PR ACE/ARB THEARPY RXD/TAKEN: ICD-10-PCS | Mod: CPTII,S$GLB,,

## 2023-04-20 PROCEDURE — 3288F PR FALLS RISK ASSESSMENT DOCUMENTED: ICD-10-PCS | Mod: CPTII,S$GLB,,

## 2023-04-20 PROCEDURE — 99999 PR PBB SHADOW E&M-EST. PATIENT-LVL III: CPT | Mod: PBBFAC,,, | Performed by: NURSE PRACTITIONER

## 2023-04-20 PROCEDURE — 99203 PR OFFICE/OUTPT VISIT, NEW, LEVL III, 30-44 MIN: ICD-10-PCS | Mod: S$GLB,,,

## 2023-04-20 PROCEDURE — 99214 OFFICE O/P EST MOD 30 MIN: CPT | Mod: S$GLB,,, | Performed by: NURSE PRACTITIONER

## 2023-04-20 PROCEDURE — 1159F PR MEDICATION LIST DOCUMENTED IN MEDICAL RECORD: ICD-10-PCS | Mod: CPTII,S$GLB,,

## 2023-04-20 PROCEDURE — 3288F FALL RISK ASSESSMENT DOCD: CPT | Mod: CPTII,S$GLB,,

## 2023-04-20 PROCEDURE — 1101F PT FALLS ASSESS-DOCD LE1/YR: CPT | Mod: CPTII,S$GLB,, | Performed by: NURSE PRACTITIONER

## 2023-04-20 PROCEDURE — 99999 PR PBB SHADOW E&M-EST. PATIENT-LVL III: ICD-10-PCS | Mod: PBBFAC,,,

## 2023-04-20 PROCEDURE — 1101F PR PT FALLS ASSESS DOC 0-1 FALLS W/OUT INJ PAST YR: ICD-10-PCS | Mod: CPTII,S$GLB,, | Performed by: NURSE PRACTITIONER

## 2023-04-20 RX ORDER — DOCUSATE SODIUM 100 MG/1
100 CAPSULE, LIQUID FILLED ORAL DAILY
COMMUNITY

## 2023-04-20 RX ORDER — OMEPRAZOLE 40 MG/1
40 CAPSULE, DELAYED RELEASE ORAL
Qty: 30 CAPSULE | Refills: 1 | Status: SHIPPED | OUTPATIENT
Start: 2023-04-20 | End: 2023-06-01

## 2023-04-20 NOTE — PATIENT INSTRUCTIONS

## 2023-04-20 NOTE — PROGRESS NOTES
"Subjective:       Patient ID: Herminia Wooten is a 72 y.o. female Body mass index is 34.6 kg/m².    Chief Complaint: Abdominal Pain  Established patient of MARISA Mccloud NP, & myself.    Reports Dr. Ortiz recently did ultrasound and reports it was normal. Reports she also had EGD with him last year and reports he wanted to do another one but she wants to wait; also had colonoscopy with him since 2020. Patient did not bring prior medical records to visit for review. Patient declined further testing at this time.    GI Problem  The primary symptoms include weight loss (trying to lose weight with dieting and exercising) and abdominal pain. Primary symptoms do not include fever, fatigue, nausea, vomiting, diarrhea, melena, hematemesis, hematochezia or dysuria.   The abdominal pain began more than 2 days ago (chronic intermittent for several years; worsened last week). The abdominal pain has been gradually improving since its onset. The abdominal pain is located in the epigastric region and LUQ (described as cramping/spasms, like a "sanna horse"; once every few days; lasts for 2-3 minutes; denies any recent traumas/falls; picking up some rugs lately and having some back spasms from it). The severity of the abdominal pain is 0/10 (currently). Relieved by: with standing upright; triggered by bending forward.   The illness is also significant for bloating (occasional) and constipation (chronic for several years; bowel movements are once a day of formed stool; TREATMENT:colace daily, suppository PRN due to unsuccessful rectocele repair, PAST TREATMENT:  miralax, enemas help, dulcolax, linzess- doesn't remember taking it). The illness does not include chills, dysphagia or odynophagia. Significant associated medical issues include GERD (frequent; prilosec 40 mg once daily prn- taking 3-4 times a month, Tums PRN, mylanta prn- taking a few times a week; PAST TREATMENTS: protonix, zantac), bowel resection (colectomy due " to endometriosis & rectocele repair), irritable bowel syndrome (PAST TREATMENT: bentyl), hemorrhoids and diverticulitis. Associated medical issues do not include inflammatory bowel disease.     Review of Systems   Constitutional:  Positive for weight loss (trying to lose weight with dieting and exercising). Negative for appetite change, chills, fatigue and fever.   HENT:  Negative for sore throat and trouble swallowing.    Respiratory:  Negative for cough, choking and shortness of breath.    Cardiovascular:  Negative for chest pain.   Gastrointestinal:  Positive for abdominal pain, bloating (occasional) and constipation (chronic for several years; bowel movements are once a day of formed stool; TREATMENT:colace daily, suppository PRN due to unsuccessful rectocele repair, PAST TREATMENT:  miralax, enemas help, dulcolax, linzess- doesn't remember taking it). Negative for anal bleeding, blood in stool, diarrhea, dysphagia, hematemesis, hematochezia, melena, nausea, rectal pain and vomiting.   Genitourinary:  Negative for difficulty urinating, dysuria and flank pain.   Neurological:  Negative for weakness.       Patient's last menstrual period was 10/07/1976. s/p hysterectomy    Past Medical History:   Diagnosis Date    Anesthesia complication     elevated BP    Colon polyp     Coronary artery disease     Diverticulitis     Diverticulosis     Encounter for blood transfusion     Fatty liver 4/25/2016    GERD (gastroesophageal reflux disease)     Hyperlipidemia     Hypertension     Hypothyroid     Irritable bowel syndrome     Kidney stone     Liver hemangioma     Seasonal allergies     Thyroid disease      Past Surgical History:   Procedure Laterality Date    ABDOMINAL SURGERY      APPENDECTOMY      CARDIAC CATHETERIZATION      2 stents    CHOLECYSTECTOMY      COLECTOMY      endometriosis- 8.5 inches removed    COLONOSCOPY  2016    repeat in 5    COLONOSCOPY N/A 06/27/2016    Procedure: COLONOSCOPY;  Surgeon: Avel PONCE  Jeremy Chowdhury MD;  Location: [facility];  Service: Endoscopy;  Laterality: N/A; repeat in 5 years for surveillance    COLONOSCOPY N/A 07/27/2020    Procedure: COLONOSCOPY;  Surgeon: Avel Luna Jr., MD;  Location: [facility];  Service: Endoscopy;  Laterality: N/A; Irritable bowel syndrome(?). hemorrhoids, 2 colon polyps removed, diverticulosis, repeat in 5 years for surveillance. biopsy: Hyperplastic polyp    CORONARY ANGIOPLASTY WITH STENT PLACEMENT      x 2    ESOPHAGOGASTRODUODENOSCOPY N/A 07/27/2020    Procedure: EGD (ESOPHAGOGASTRODUODENOSCOPY);  Surgeon: Avel Luna Jr., MD;  Location: [facility];  Service: Endoscopy;  Laterality: N/A; unremarkable; biopsy: stomach- Mild chronic gastritis, negative for h pylori. duodenum WNL    HYSTERECTOMY      INCONTINENCE SURGERY      INJECTION OF ANESTHETIC AGENT INTO SACROILIAC JOINT Right 07/03/2019    Procedure: BLOCK, SACROILIAC JOINT;  Surgeon: Homer Diamond MD;  Location: [facility];  Service: Pain Management;  Laterality: Right;    INJECTION OF FACET JOINT Right 08/28/2019    Procedure: INJECTION, FACET JOINT;  Surgeon: Homer Diamond MD;  Location: [facility];  Service: Pain Management;  Laterality: Right;  L4 and L5    JOINT REPLACEMENT Right     Knee    OOPHORECTOMY      rectocele  05/2017    repair    Stent OM      UPPER GASTROINTESTINAL ENDOSCOPY  6/2013 Dr. Peters    reflux esophagitis; biopsy: negative dysplasia, intestinal metaplasia; mild chronic inflammation- GERD related & regenerative glandular epithelial change; strips of squamous esophageal mucosa with moderate basal epithelial cell hyperplasia and rare intraepithelial eosinophils (< 1 per 10 high power fields)    UPPER GASTROINTESTINAL ENDOSCOPY  06/2016    Dr. luna    UPPER GASTROINTESTINAL ENDOSCOPY  2022    Dr. Barrera    URETHRA SURGERY       Family History   Problem Relation Age of Onset    Clotting disorder Maternal Grandmother     Diverticulitis Maternal Grandfather     Anesthesia  problems Neg Hx     Breast cancer Neg Hx     Ovarian cancer Neg Hx     Colon cancer Neg Hx     Colon polyps Neg Hx     Crohn's disease Neg Hx     Ulcerative colitis Neg Hx     Glaucoma Neg Hx     Macular degeneration Neg Hx      Social History     Tobacco Use    Smoking status: Former     Packs/day: 0.25     Years: 35.00     Pack years: 8.75     Types: Cigarettes     Quit date: 10/7/1999     Years since quittin.5    Smokeless tobacco: Never   Substance Use Topics    Alcohol use: No    Drug use: No     Wt Readings from Last 10 Encounters:   23 77.7 kg (171 lb 4.8 oz)   22 78.5 kg (173 lb 1 oz)   22 78.5 kg (173 lb)   21 78.6 kg (173 lb 4.5 oz)   21 80.5 kg (177 lb 7.5 oz)   20 80.1 kg (176 lb 9.4 oz)   10/29/20 80 kg (176 lb 5.9 oz)   10/17/20 79.6 kg (175 lb 7.8 oz)   20 79.4 kg (175 lb)   20 83.5 kg (184 lb 1.4 oz)     Lab Results   Component Value Date    WBC 10.21 10/17/2020    HGB 13.8 10/17/2020    HCT 42.6 10/17/2020    MCV 92 10/17/2020     10/17/2020     CMP  Sodium   Date Value Ref Range Status   10/17/2020 139 136 - 145 mmol/L Final     Potassium   Date Value Ref Range Status   10/17/2020 4.3 3.5 - 5.1 mmol/L Final     Chloride   Date Value Ref Range Status   10/17/2020 103 95 - 110 mmol/L Final     CO2   Date Value Ref Range Status   10/17/2020 28 22 - 31 mmol/L Final     Glucose   Date Value Ref Range Status   10/17/2020 228 (H) 70 - 110 mg/dL Final     Comment:     The ADA recommends the following guidelines for fasting glucose:  Normal:       less than 100 mg/dL  Prediabetes:  100 mg/dL to 125 mg/dL  Diabetes:     126 mg/dL or higher       BUN   Date Value Ref Range Status   10/17/2020 11 7 - 18 mg/dL Final     Creatinine   Date Value Ref Range Status   2022 0.8 0.5 - 1.4 mg/dL Final     Calcium   Date Value Ref Range Status   10/17/2020 9.7 8.4 - 10.2 mg/dL Final     Total Protein   Date Value Ref Range Status   10/17/2020 7.8 6.0 - 8.4  g/dL Final     Albumin   Date Value Ref Range Status   10/17/2020 4.6 3.5 - 5.2 g/dL Final     Total Bilirubin   Date Value Ref Range Status   10/17/2020 0.5 0.2 - 1.3 mg/dL Final     Alkaline Phosphatase   Date Value Ref Range Status   10/17/2020 110 38 - 145 U/L Final     AST   Date Value Ref Range Status   10/17/2020 34 14 - 36 U/L Final     ALT   Date Value Ref Range Status   10/17/2020 38 (H) 0 - 35 U/L Final     Anion Gap   Date Value Ref Range Status   10/17/2020 8 8 - 16 mmol/L Final     eGFR if    Date Value Ref Range Status   06/23/2022 >60 >60 mL/min/1.73 m^2 Final     eGFR if non    Date Value Ref Range Status   06/23/2022 >60 >60 mL/min/1.73 m^2 Final     Comment:     Calculation used to obtain the estimated glomerular filtration  rate (eGFR) is the CKD-EPI equation.        Lab Results   Component Value Date    OCCULTBLOOD Negative 10/17/2020     Lab Results   Component Value Date    LIPASE 37 06/15/2020     Lab Results   Component Value Date    LIPASERES 131 10/17/2020     Lab Results   Component Value Date    AMYLASE 82 04/13/2016     6/15/2021 stool studies reviewed (occasional yeast)    Reviewed prior medical records including radiology report of 6/23/2022 and 10/17/2020 CT scans of abdomen pelvis, 3/13/2019 HIDA scan; 3/4/2019 abdominal ultrasound, 5/10/17 MRI abdomen; & endoscopy history (see surgical history).    Objective:      Physical Exam  Vitals and nursing note reviewed.   Constitutional:       General: She is not in acute distress.     Appearance: Normal appearance. She is well-developed. She is not diaphoretic.   HENT:      Mouth/Throat:      Lips: Pink. No lesions.      Mouth: Mucous membranes are moist. No oral lesions.      Tongue: No lesions.      Pharynx: Oropharynx is clear. No pharyngeal swelling or posterior oropharyngeal erythema.   Eyes:      General: No scleral icterus.     Conjunctiva/sclera: Conjunctivae normal.      Pupils: Pupils are equal,  round, and reactive to light.   Pulmonary:      Effort: Pulmonary effort is normal. No respiratory distress.      Breath sounds: Normal breath sounds. No wheezing.   Abdominal:      General: Bowel sounds are normal. There is no distension or abdominal bruit.      Palpations: Abdomen is soft. Abdomen is not rigid. There is no mass.      Tenderness: There is no abdominal tenderness. There is no guarding or rebound. Negative signs include Leyva's sign and McBurney's sign.   Skin:     General: Skin is warm and dry.      Coloration: Skin is not jaundiced or pale.      Findings: No erythema or rash.   Neurological:      Mental Status: She is alert and oriented to person, place, and time.   Psychiatric:         Behavior: Behavior normal.         Thought Content: Thought content normal.         Judgment: Judgment normal.       Assessment:       1. LUQ pain    2. Epigastric pain    3. History of gastroesophageal reflux (GERD)    4. Heartburn    5. Elevated ALT measurement    6. Hepatic steatosis    7. Liver hemangioma    8. History of chronic constipation          Plan:       Patient agreed to sign medical records release consent for us to obtain records from Dr. Ortiz    LUQ pain & Epigastric pain  - schedule EGD, discussed procedure with patient, including risks and benefits, patient verbalized understanding but patient declined scheduling EGD at this time  - avoid/minimize use of NSAIDs- since they can cause GI upset, bleeding and/or ulcers. If NSAID must be taken, recommend take with food.  - discussed about scheduling abdominal imaging and patient reports she had some done recently with Dr. Ortiz and does not want to schedule any further testing at this time.  - Recommend follow-up with Primary Care Provider for continued evaluation and management of non-GI related etiologies (possible musculoskeletal).    History of gastroesophageal reflux (GERD) & Heartburn  -  INCREASE TO   omeprazole (PRILOSEC) 40 MG capsule;  Take 1 capsule (40 mg total) by mouth before evening meal.  Dispense: 30 capsule; Refill: 1  - schedule EGD, discussed procedure with patient, including risks and benefits, patient verbalized understanding but patient declined scheduling EGD at this time    Elevated ALT measurement, Hepatic steatosis, & Liver hemangioma  - minimize/avoid alcohol and tylenol products, & follow-up with PCP for continued evaluation and management; if specialist is needed, recommend seeing hepatology.    History of chronic constipation  Recommend daily exercise as tolerated, adequate water intake (six 8-oz glasses of water daily), and high fiber diet. OTC fiber supplements are recommended if diet does not reach daily fiber goal (20-30 grams daily), such as Metamucil, Citrucel, or FiberCon (take as directed, separate from other oral medications by >2 hours).  - CONTINUE OTC stool softener such as Colace as directed to avoid hard stools and straining with bowel movements PRN  -Recommend trying OTC MiraLax once daily (17g PO) as directed  - If no improvement with above recommendations, try intermittently dosed Glycerin suppositories as directed (every 3-4  days) PRN to facilitate bowel movements  -If still no improvement with these measures, call/follow-up    Follow up in about 1 month (around 5/20/2023), or if symptoms worsen or fail to improve.    If no improvement in symptoms or symptoms worsen, call/follow-up at clinic or go to ER.        43 minutes of total time spent on the encounter, which includes face to face time and non-face to face time preparing to see the patient (e.g., review of tests), Obtaining and/or reviewing separately obtained history, Documenting clinical information in the electronic or other health record, Independently interpreting results (not separately reported) and communicating results to the patient/family/caregiver, or Care coordination (not separately reported).

## 2023-04-20 NOTE — PROGRESS NOTES
HPI    Pt here for urgent exam for possible cyst or ulcer on OS. Pt sts she has a   bump on OS that has has been there for 1 yr and thinks it might be an   ulcer. Pt sts OS has stinging feeling when waking up in morning. Denies   eye pain. Pt would also like second opinion on cataract.   Last edited by Grisel Morales MA on 4/20/2023 10:16 AM.            Assessment /Plan     For exam results, see Encounter Report.    Dry eye syndrome of bilateral lacrimal glands    Allergic conjunctivitis of both eyes    Age-related nuclear cataract, bilateral      1-2. Pt typically followed by Dr. Arguelles, last DFE: ~2 months ago. No cyst or ulcer upon examination today OS. Signs and symptoms consistent with dry eye secondary to insufficient tear film (no staining), mild blepharitis, and allergic conjunctivitis. Ed pt thoroughly on all findings and recommended pt increase Systane use to QID and start incorporating gel drop at night. Advised pt switch to PF Systane if using more than QID. OcuSoft lid scrubs recommended daily for lid hygiene. Pataday 1x/day for relief of itching, especially during allergy season. Ed pt to call or message asap if worsening pain, irritation, or changes in vision noticed.     3. Mild NS cataracts OU (pt not dilated), BCVA 20/25. Pt states Dr. Arguelles says they are not ready for removal. Ed pt that I agree due to good acuity and that surgical consult is not recommended at this time. Continue yearly exams with Dr. Arguelles.    RTC: in-office prn.

## 2023-05-08 ENCOUNTER — TELEPHONE (OUTPATIENT)
Dept: GASTROENTEROLOGY | Facility: CLINIC | Age: 73
End: 2023-05-08
Payer: MEDICARE

## 2023-05-08 ENCOUNTER — TELEPHONE (OUTPATIENT)
Dept: OTOLARYNGOLOGY | Facility: CLINIC | Age: 73
End: 2023-05-08
Payer: MEDICARE

## 2023-05-08 ENCOUNTER — TREATMENT PLANNING (OUTPATIENT)
Dept: GASTROENTEROLOGY | Facility: CLINIC | Age: 73
End: 2023-05-08
Payer: MEDICARE

## 2023-05-08 DIAGNOSIS — R74.01 ELEVATED ALT MEASUREMENT: Primary | ICD-10-CM

## 2023-05-08 DIAGNOSIS — R10.12 LUQ PAIN: ICD-10-CM

## 2023-05-08 DIAGNOSIS — D18.03 LIVER HEMANGIOMA: ICD-10-CM

## 2023-05-08 DIAGNOSIS — R10.13 EPIGASTRIC PAIN: ICD-10-CM

## 2023-05-08 DIAGNOSIS — K76.0 HEPATIC STEATOSIS: ICD-10-CM

## 2023-05-08 NOTE — PROGRESS NOTES
Reviewed medical records received from Dr. Ortiz, summarized below and in medical & surgical history (endoscopies, etc), copies made & given to nurse to be scanned into system:   Visit notes: 4/1/2019, 4/15/2019, 6/8/2022, 6/30/2022, 7/13/2022, 8/1/2022, 12/15/2022, 2/16/2023  3/12/2019 discharge summary  Lab results (see records for full results): 6/9/2022 CMP with ALT 38 (H) otherwise LFTs WNL; CBC with H&H 15.2 & 48.3 (H),WBC WNL and platelet count WNL, TSH WNL, ESR WNL  Diagnoses: GERD, bowel adhesions, diverticulosis, partial bowel obstruction  EGDs: 3/20/19 & 8/17/22  Recommendations:  Continue with previous recommendations. We did not receive copy of any ultrasound report or colonoscopy report. If symptoms have persisted, recommend completing blood work and abdominal ultrasound and possible EGD if patient is willing to schedule. Orders placed and routed to nursing staff.

## 2023-05-08 NOTE — TELEPHONE ENCOUNTER
S/w pt and offered next available in July. Pt declined and wanted to be seen today since her sister was having surgery tomorrow. I advised pt and that we did not have any openings for today with any of our providers. Pt said okay then and hung up on me.

## 2023-05-08 NOTE — Clinical Note
Please inform patient that we have received and reviewed the medical records we received from Dr. Ortiz. Continue with previous recommendations. We did not receive copy of any ultrasound report or colonoscopy report. If symptoms have persisted, recommend completing blood work and abdominal ultrasound and possible EGD if patient is willing to schedule. Orders placed and routed to nursing staff. Thanks AYLIN

## 2023-05-08 NOTE — TELEPHONE ENCOUNTER
----- Message from Maribell Plasencia sent at 5/6/2023 11:19 AM CDT -----  Contact: pt  Type: Needs Medical Advice         Who Called: pt  Best Call Back Number:[phone]  Additional Information: Requesting a call back regarding  pt is asking for an appt to be seen. Pt said she is suffering from Vertigo really bad.     Please Advise- Thank you

## 2023-05-08 NOTE — TELEPHONE ENCOUNTER
----- Message from Carolyn Quintana sent at 5/8/2023 11:02 AM CDT -----  Type:  Patient Returning Call    Who Called:  pt   Who Left Message for Patient:  Ericka Herrerabodeaux,  Does the patient know what this is regarding?:  yes   Best Call Back Number:  [phone] (home)     Additional Information: pt wants to speak to you again  please advise thank you

## 2023-05-08 NOTE — TELEPHONE ENCOUNTER
----- Message from SHANNAN Mcgraw sent at 5/8/2023 10:49 AM CDT -----  Please inform patient that we have received and reviewed the medical records we received from Dr. Ortiz. Continue with previous recommendations. We did not receive copy of any ultrasound report or colonoscopy report. If symptoms have persisted, recommend completing blood work and abdominal ultrasound and possible EGD if patient is willing to schedule. Orders placed and routed to nursing staff.  Thanks  AYLIN

## 2023-05-08 NOTE — TELEPHONE ENCOUNTER
Returned patient call, patient is going to call dr bailey as she had ultrasound and blood work done this year and will get the office to send it over to us.

## 2023-05-08 NOTE — TELEPHONE ENCOUNTER
----- Message from Myla Rod sent at 5/8/2023  3:54 PM CDT -----  Type: Needs Medical Advice  Who Called:  Pt  Best Call Back Number: [phone]  Additional Information: Pt is calling about a fax that be sent over to the Dr, please call bk to advise Thanks

## 2023-05-08 NOTE — TELEPHONE ENCOUNTER
Returned patient call, informed patient she had declined imaging at time of visit, patient wants to proceed with CT scan. Pt also stated ENT is supposed to contact her but no referral in system. Informed pt to reach out to PCP for referral or to call main line and schedule. Pt verbalized understanding

## 2023-05-08 NOTE — TELEPHONE ENCOUNTER
Returned patient call, informed pt I have called the provider office for fax and it can take up to 48 hours for information

## 2023-05-08 NOTE — TELEPHONE ENCOUNTER
Returned patient call, informed pt I will call Dr. Ortiz in regards to US and blood work. Spoke with Dr. Ortiz office and it will be faxed over.

## 2023-05-12 ENCOUNTER — CLINICAL SUPPORT (OUTPATIENT)
Dept: AUDIOLOGY | Facility: CLINIC | Age: 73
End: 2023-05-12
Payer: MEDICARE

## 2023-05-12 DIAGNOSIS — H81.10 BENIGN PAROXYSMAL POSITIONAL VERTIGO, UNSPECIFIED LATERALITY: ICD-10-CM

## 2023-05-12 DIAGNOSIS — H81.10 BENIGN PAROXYSMAL POSITIONAL VERTIGO, UNSPECIFIED LATERALITY: Primary | ICD-10-CM

## 2023-05-12 DIAGNOSIS — R42 DIZZINESS: Primary | ICD-10-CM

## 2023-05-12 PROCEDURE — 92542 POSITIONAL NYSTAGMUS TEST: CPT | Mod: ,,, | Performed by: AUDIOLOGIST

## 2023-05-12 PROCEDURE — 92542 PR POSITIONAL NYSTAGMUS TEST: ICD-10-PCS | Mod: ,,, | Performed by: AUDIOLOGIST

## 2023-05-19 NOTE — PROGRESS NOTES
"Herminia Wooten was seen 5/12/23 for a BPPV evaluation    Patient reported the recent onset of significant dizziness that she feels may be due to the "crystals" in her ear.       VAT was negative right and negative left     Head Right Positional was negative - 12 d/s DB nystagmus with fixation   Head Left Positional was negative -  7 d/s DB nystagmus with fixation   Hallpike Right was negative - 15 d/s DB nystagmus with fixation   Hallpike Left was negative - 16 d/s DB nystagmus with fixation    Impression: Testing was negative for BPPV.  Pt advised to contact her PCP for further work-up due to presence of vertical nystagmus during testing.    "

## 2023-06-01 DIAGNOSIS — Z87.19 HISTORY OF GASTROESOPHAGEAL REFLUX (GERD): ICD-10-CM

## 2023-06-01 RX ORDER — OMEPRAZOLE 40 MG/1
CAPSULE, DELAYED RELEASE ORAL
Qty: 90 CAPSULE | Refills: 0 | Status: SHIPPED | OUTPATIENT
Start: 2023-06-01

## 2023-07-05 ENCOUNTER — TELEPHONE (OUTPATIENT)
Dept: OBSTETRICS AND GYNECOLOGY | Facility: CLINIC | Age: 73
End: 2023-07-05
Payer: MEDICARE

## 2023-07-05 DIAGNOSIS — Z12.31 VISIT FOR SCREENING MAMMOGRAM: Primary | ICD-10-CM

## 2023-07-05 NOTE — TELEPHONE ENCOUNTER
----- Message from Shelbie Mckinley sent at 7/5/2023  3:37 PM CDT -----  Contact: self  Caller is requesting to schedule their Lab appointment prior to annual appointment.  Order is not listed in EPIC.  Please enter order and contact patient to schedule.    Name of Caller: Pt    Preferred Date and Time of Labs: Open    Date of EPP Appointment: n/a    Where would they like the lab performed? [facility]    Would the patient rather a call back or a response via [facility]? call    Best Call Back Number: [phone]    Additional Information: Pt would like orders for a mammo put in system...     Please call pt when ready for scheduling... Thank you...

## 2023-07-19 ENCOUNTER — HOSPITAL ENCOUNTER (OUTPATIENT)
Dept: RADIOLOGY | Facility: HOSPITAL | Age: 73
Discharge: HOME | End: 2023-07-19
Attending: OBSTETRICS & GYNECOLOGY
Payer: MEDICARE

## 2023-07-19 DIAGNOSIS — Z12.31 VISIT FOR SCREENING MAMMOGRAM: ICD-10-CM

## 2023-07-19 PROCEDURE — 77067 MAMMO DIGITAL SCREENING BILAT WITH TOMO: ICD-10-PCS | Mod: 26,,, | Performed by: RADIOLOGY

## 2023-07-19 PROCEDURE — 77063 MAMMO DIGITAL SCREENING BILAT WITH TOMO: ICD-10-PCS | Mod: 26,,, | Performed by: RADIOLOGY

## 2023-07-19 PROCEDURE — 77067 SCR MAMMO BI INCL CAD: CPT | Mod: TC,PO

## 2023-07-19 PROCEDURE — 77067 SCR MAMMO BI INCL CAD: CPT | Mod: 26,,, | Performed by: RADIOLOGY

## 2023-07-19 PROCEDURE — 77063 BREAST TOMOSYNTHESIS BI: CPT | Mod: 26,,, | Performed by: RADIOLOGY

## 2023-07-20 ENCOUNTER — PATIENT MESSAGE (OUTPATIENT)
Dept: OBSTETRICS AND GYNECOLOGY | Facility: CLINIC | Age: 73
End: 2023-07-20
Payer: MEDICARE

## 2023-07-31 RX ORDER — ESTRADIOL 0.1 MG/G
CREAM VAGINAL
Qty: 42.5 G | Refills: 3 | Status: SHIPPED | OUTPATIENT
Start: 2023-07-31

## 2023-08-09 ENCOUNTER — OFFICE VISIT (OUTPATIENT)
Dept: OBSTETRICS AND GYNECOLOGY | Facility: CLINIC | Age: 73
End: 2023-08-09
Payer: MEDICARE

## 2023-08-09 VITALS
SYSTOLIC BLOOD PRESSURE: 138 MMHG | DIASTOLIC BLOOD PRESSURE: 52 MMHG | HEIGHT: 59 IN | BODY MASS INDEX: 34.13 KG/M2 | WEIGHT: 169.31 LBS

## 2023-08-09 DIAGNOSIS — R39.9 UTI SYMPTOMS: Primary | ICD-10-CM

## 2023-08-09 LAB
BILIRUBIN, UA POC OHS: NEGATIVE
BLOOD, UA POC OHS: NEGATIVE
CLARITY, UA POC OHS: CLEAR
COLOR, UA POC OHS: YELLOW
GLUCOSE, UA POC OHS: 500
KETONES, UA POC OHS: NEGATIVE
LEUKOCYTES, UA POC OHS: NEGATIVE
NITRITE, UA POC OHS: NEGATIVE
PH, UA POC OHS: 6
PROTEIN, UA POC OHS: NEGATIVE
SPECIFIC GRAVITY, UA POC OHS: 1.01
UROBILINOGEN, UA POC OHS: 0.2

## 2023-08-09 PROCEDURE — 81003 URINALYSIS AUTO W/O SCOPE: CPT | Mod: QW,S$GLB,, | Performed by: OBSTETRICS & GYNECOLOGY

## 2023-08-09 PROCEDURE — 87086 URINE CULTURE/COLONY COUNT: CPT | Performed by: OBSTETRICS & GYNECOLOGY

## 2023-08-09 PROCEDURE — 1101F PT FALLS ASSESS-DOCD LE1/YR: CPT | Mod: CPTII,S$GLB,, | Performed by: OBSTETRICS & GYNECOLOGY

## 2023-08-09 PROCEDURE — 3008F BODY MASS INDEX DOCD: CPT | Mod: CPTII,S$GLB,, | Performed by: OBSTETRICS & GYNECOLOGY

## 2023-08-09 PROCEDURE — 3075F PR MOST RECENT SYSTOLIC BLOOD PRESS GE 130-139MM HG: ICD-10-PCS | Mod: CPTII,S$GLB,, | Performed by: OBSTETRICS & GYNECOLOGY

## 2023-08-09 PROCEDURE — 99213 OFFICE O/P EST LOW 20 MIN: CPT | Mod: S$GLB,,, | Performed by: OBSTETRICS & GYNECOLOGY

## 2023-08-09 PROCEDURE — 99999 PR PBB SHADOW E&M-EST. PATIENT-LVL IV: CPT | Mod: PBBFAC,,, | Performed by: OBSTETRICS & GYNECOLOGY

## 2023-08-09 PROCEDURE — 3078F DIAST BP <80 MM HG: CPT | Mod: CPTII,S$GLB,, | Performed by: OBSTETRICS & GYNECOLOGY

## 2023-08-09 PROCEDURE — 99999 PR PBB SHADOW E&M-EST. PATIENT-LVL IV: ICD-10-PCS | Mod: PBBFAC,,, | Performed by: OBSTETRICS & GYNECOLOGY

## 2023-08-09 PROCEDURE — 1101F PR PT FALLS ASSESS DOC 0-1 FALLS W/OUT INJ PAST YR: ICD-10-PCS | Mod: CPTII,S$GLB,, | Performed by: OBSTETRICS & GYNECOLOGY

## 2023-08-09 PROCEDURE — 3008F PR BODY MASS INDEX (BMI) DOCUMENTED: ICD-10-PCS | Mod: CPTII,S$GLB,, | Performed by: OBSTETRICS & GYNECOLOGY

## 2023-08-09 PROCEDURE — 3288F PR FALLS RISK ASSESSMENT DOCUMENTED: ICD-10-PCS | Mod: CPTII,S$GLB,, | Performed by: OBSTETRICS & GYNECOLOGY

## 2023-08-09 PROCEDURE — 1126F AMNT PAIN NOTED NONE PRSNT: CPT | Mod: CPTII,S$GLB,, | Performed by: OBSTETRICS & GYNECOLOGY

## 2023-08-09 PROCEDURE — 1126F PR PAIN SEVERITY QUANTIFIED, NO PAIN PRESENT: ICD-10-PCS | Mod: CPTII,S$GLB,, | Performed by: OBSTETRICS & GYNECOLOGY

## 2023-08-09 PROCEDURE — 4010F PR ACE/ARB THEARPY RXD/TAKEN: ICD-10-PCS | Mod: CPTII,S$GLB,, | Performed by: OBSTETRICS & GYNECOLOGY

## 2023-08-09 PROCEDURE — 3288F FALL RISK ASSESSMENT DOCD: CPT | Mod: CPTII,S$GLB,, | Performed by: OBSTETRICS & GYNECOLOGY

## 2023-08-09 PROCEDURE — 81003 POCT URINALYSIS(INSTRUMENT): ICD-10-PCS | Mod: QW,S$GLB,, | Performed by: OBSTETRICS & GYNECOLOGY

## 2023-08-09 PROCEDURE — 3078F PR MOST RECENT DIASTOLIC BLOOD PRESSURE < 80 MM HG: ICD-10-PCS | Mod: CPTII,S$GLB,, | Performed by: OBSTETRICS & GYNECOLOGY

## 2023-08-09 PROCEDURE — 1159F MED LIST DOCD IN RCRD: CPT | Mod: CPTII,S$GLB,, | Performed by: OBSTETRICS & GYNECOLOGY

## 2023-08-09 PROCEDURE — 4010F ACE/ARB THERAPY RXD/TAKEN: CPT | Mod: CPTII,S$GLB,, | Performed by: OBSTETRICS & GYNECOLOGY

## 2023-08-09 PROCEDURE — 99213 PR OFFICE/OUTPT VISIT, EST, LEVL III, 20-29 MIN: ICD-10-PCS | Mod: S$GLB,,, | Performed by: OBSTETRICS & GYNECOLOGY

## 2023-08-09 PROCEDURE — 1159F PR MEDICATION LIST DOCUMENTED IN MEDICAL RECORD: ICD-10-PCS | Mod: CPTII,S$GLB,, | Performed by: OBSTETRICS & GYNECOLOGY

## 2023-08-09 PROCEDURE — 3075F SYST BP GE 130 - 139MM HG: CPT | Mod: CPTII,S$GLB,, | Performed by: OBSTETRICS & GYNECOLOGY

## 2023-08-09 RX ORDER — ASPIRIN 81 MG/1
81 TABLET ORAL
COMMUNITY

## 2023-08-09 RX ORDER — ALPRAZOLAM 0.5 MG/1
0.5 TABLET ORAL
COMMUNITY
Start: 2023-05-31

## 2023-08-09 NOTE — PROGRESS NOTES
"Chief Complaint   Patient presents with    medication check       History of Present Illness   72 y.o.  female   patient presents today for meds follow up, using steroid cream for lichen sclerosis on and off, counseled on proper use and taper as needed. Also 2x/wl estrogen cream internally, counseled.     Past medical and surgical history reviewed.   I have reviewed the patient's medical history in detail and updated the computerized patient record.    Review of patient's allergies indicates:   Allergen Reactions    Codeine Other (See Comments)     nervousness    Aliskiren      Other reaction(s): Other (See Comments)    Benazepril      Other reaction(s): Other (See Comments)    Ezetimibe-simvastatin      Other reaction(s): Other (See Comments)    Irbesartan      Other reaction(s): Other (See Comments)    Caffeine Anxiety     In medications    Percodan [oxycodone hcl-oxycodone-asa] Anxiety         Review of Systems - neg  GEN ROS: negative for - chills or fever  Breast ROS: negative for breast lumps  Genito-Urinary ROS: no dysuria, trouble voiding, or hematuria      Physical Examination:  BP (!) 138/52   Ht 4' 11" (1.499 m)   Wt 76.8 kg (169 lb 5 oz)   LMP 10/07/1976   BMI 34.20 kg/m²    deferred      Assessment:  Lichen sclerosis  Vaginal estrogen / atrophy- counseled  1. UTI symptoms  POCT Urinalysis(Instrument)    CULTURE, URINE          Plan:  Meds use counseling, refills.   Patient informed will be contacted with results within 2 weeks. Encouraged to please call back or email if she has not heard from us by then.          "

## 2023-08-11 LAB — BACTERIA UR CULT: NO GROWTH

## 2023-10-19 ENCOUNTER — TELEPHONE (OUTPATIENT)
Dept: GASTROENTEROLOGY | Facility: CLINIC | Age: 73
End: 2023-10-19
Payer: MEDICARE

## 2023-10-19 NOTE — TELEPHONE ENCOUNTER
----- Message from Wayne Amin sent at 10/19/2023  1:12 PM CDT -----  Contact: self  Type: Needs Medical Advice  Who Called: Patient   Best Call Back Number: ([phone] direct number to Dr. Queen Office) ( Office Fax [phone])  Additional Information: Pt states she wants to have her results from her 7/27/2020 procedure of her EGD and Colonoscopy sent to Dr. Queen if possible. Plz call dn let pt know when this is done.  Thanks

## 2023-11-08 ENCOUNTER — LAB VISIT (OUTPATIENT)
Dept: LAB | Facility: HOSPITAL | Age: 73
End: 2023-11-08
Attending: NURSE PRACTITIONER
Payer: MEDICARE

## 2023-11-08 ENCOUNTER — OFFICE VISIT (OUTPATIENT)
Dept: GASTROENTEROLOGY | Facility: CLINIC | Age: 73
End: 2023-11-08
Payer: MEDICARE

## 2023-11-08 VITALS — BODY MASS INDEX: 33.64 KG/M2 | HEIGHT: 59 IN | WEIGHT: 166.88 LBS

## 2023-11-08 DIAGNOSIS — R10.10 UPPER ABDOMINAL PAIN: ICD-10-CM

## 2023-11-08 DIAGNOSIS — Z87.19 HISTORY OF GASTROESOPHAGEAL REFLUX (GERD): ICD-10-CM

## 2023-11-08 DIAGNOSIS — R16.0 HEPATOMEGALY: ICD-10-CM

## 2023-11-08 DIAGNOSIS — R74.01 ELEVATED ALT MEASUREMENT: ICD-10-CM

## 2023-11-08 DIAGNOSIS — D18.03 LIVER HEMANGIOMA: ICD-10-CM

## 2023-11-08 DIAGNOSIS — R12 HEARTBURN: ICD-10-CM

## 2023-11-08 DIAGNOSIS — K76.0 HEPATIC STEATOSIS: Primary | ICD-10-CM

## 2023-11-08 DIAGNOSIS — Z87.19 HISTORY OF CHRONIC CONSTIPATION: ICD-10-CM

## 2023-11-08 LAB
ALBUMIN SERPL BCP-MCNC: 4.1 G/DL (ref 3.5–5.2)
ALP SERPL-CCNC: 137 U/L (ref 55–135)
ALT SERPL W/O P-5'-P-CCNC: 45 U/L (ref 10–44)
AST SERPL-CCNC: 36 U/L (ref 10–40)
BILIRUB DIRECT SERPL-MCNC: 0.1 MG/DL (ref 0.1–0.3)
BILIRUB SERPL-MCNC: 0.4 MG/DL (ref 0.1–1)
ERYTHROCYTE [DISTWIDTH] IN BLOOD BY AUTOMATED COUNT: 12.6 % (ref 11.5–14.5)
HCT VFR BLD AUTO: 47.7 % (ref 37–48.5)
HGB BLD-MCNC: 15.2 G/DL (ref 12–16)
LIPASE SERPL-CCNC: 34 U/L (ref 4–60)
MCH RBC QN AUTO: 29.1 PG (ref 27–31)
MCHC RBC AUTO-ENTMCNC: 31.9 G/DL (ref 32–36)
MCV RBC AUTO: 91 FL (ref 82–98)
PLATELET # BLD AUTO: 190 K/UL (ref 150–450)
PMV BLD AUTO: 12.9 FL (ref 9.2–12.9)
PROT SERPL-MCNC: 7 G/DL (ref 6–8.4)
RBC # BLD AUTO: 5.22 M/UL (ref 4–5.4)
WBC # BLD AUTO: 7.35 K/UL (ref 3.9–12.7)

## 2023-11-08 PROCEDURE — 1126F PR PAIN SEVERITY QUANTIFIED, NO PAIN PRESENT: ICD-10-PCS | Mod: CPTII,S$GLB,, | Performed by: NURSE PRACTITIONER

## 2023-11-08 PROCEDURE — 83690 ASSAY OF LIPASE: CPT | Performed by: NURSE PRACTITIONER

## 2023-11-08 PROCEDURE — 4010F ACE/ARB THERAPY RXD/TAKEN: CPT | Mod: CPTII,S$GLB,, | Performed by: NURSE PRACTITIONER

## 2023-11-08 PROCEDURE — 99999 PR PBB SHADOW E&M-EST. PATIENT-LVL IV: ICD-10-PCS | Mod: PBBFAC,,, | Performed by: NURSE PRACTITIONER

## 2023-11-08 PROCEDURE — 3288F PR FALLS RISK ASSESSMENT DOCUMENTED: ICD-10-PCS | Mod: CPTII,S$GLB,, | Performed by: NURSE PRACTITIONER

## 2023-11-08 PROCEDURE — 99999 PR PBB SHADOW E&M-EST. PATIENT-LVL IV: CPT | Mod: PBBFAC,,, | Performed by: NURSE PRACTITIONER

## 2023-11-08 PROCEDURE — 99214 PR OFFICE/OUTPT VISIT, EST, LEVL IV, 30-39 MIN: ICD-10-PCS | Mod: S$GLB,,, | Performed by: NURSE PRACTITIONER

## 2023-11-08 PROCEDURE — 4010F PR ACE/ARB THEARPY RXD/TAKEN: ICD-10-PCS | Mod: CPTII,S$GLB,, | Performed by: NURSE PRACTITIONER

## 2023-11-08 PROCEDURE — 3008F PR BODY MASS INDEX (BMI) DOCUMENTED: ICD-10-PCS | Mod: CPTII,S$GLB,, | Performed by: NURSE PRACTITIONER

## 2023-11-08 PROCEDURE — 80076 HEPATIC FUNCTION PANEL: CPT | Performed by: NURSE PRACTITIONER

## 2023-11-08 PROCEDURE — 1159F MED LIST DOCD IN RCRD: CPT | Mod: CPTII,S$GLB,, | Performed by: NURSE PRACTITIONER

## 2023-11-08 PROCEDURE — 1126F AMNT PAIN NOTED NONE PRSNT: CPT | Mod: CPTII,S$GLB,, | Performed by: NURSE PRACTITIONER

## 2023-11-08 PROCEDURE — 99214 OFFICE O/P EST MOD 30 MIN: CPT | Mod: S$GLB,,, | Performed by: NURSE PRACTITIONER

## 2023-11-08 PROCEDURE — 36415 COLL VENOUS BLD VENIPUNCTURE: CPT | Mod: PO | Performed by: NURSE PRACTITIONER

## 2023-11-08 PROCEDURE — 85027 COMPLETE CBC AUTOMATED: CPT | Performed by: NURSE PRACTITIONER

## 2023-11-08 PROCEDURE — 1160F RVW MEDS BY RX/DR IN RCRD: CPT | Mod: CPTII,S$GLB,, | Performed by: NURSE PRACTITIONER

## 2023-11-08 PROCEDURE — 1101F PT FALLS ASSESS-DOCD LE1/YR: CPT | Mod: CPTII,S$GLB,, | Performed by: NURSE PRACTITIONER

## 2023-11-08 PROCEDURE — 1160F PR REVIEW ALL MEDS BY PRESCRIBER/CLIN PHARMACIST DOCUMENTED: ICD-10-PCS | Mod: CPTII,S$GLB,, | Performed by: NURSE PRACTITIONER

## 2023-11-08 PROCEDURE — 3008F BODY MASS INDEX DOCD: CPT | Mod: CPTII,S$GLB,, | Performed by: NURSE PRACTITIONER

## 2023-11-08 PROCEDURE — 3288F FALL RISK ASSESSMENT DOCD: CPT | Mod: CPTII,S$GLB,, | Performed by: NURSE PRACTITIONER

## 2023-11-08 PROCEDURE — 1101F PR PT FALLS ASSESS DOC 0-1 FALLS W/OUT INJ PAST YR: ICD-10-PCS | Mod: CPTII,S$GLB,, | Performed by: NURSE PRACTITIONER

## 2023-11-08 PROCEDURE — 1159F PR MEDICATION LIST DOCUMENTED IN MEDICAL RECORD: ICD-10-PCS | Mod: CPTII,S$GLB,, | Performed by: NURSE PRACTITIONER

## 2023-11-08 NOTE — PROGRESS NOTES
"Subjective:       Patient ID: Herminia Wooten is a 73 y.o. female Body mass index is 33.71 kg/m².    Chief Complaint: Other (Follow-up)  Established patient of MARISA Mccloud NP, & myself.    Patient has not yet completed ultrasound and labs as previously ordered. Patient reports she is here to follow-up on liver findings on prior imaging. Patient reports concerned about possible side effects of long term PPI use.    GI Problem  The primary symptoms include weight loss (trying to lose weight with dieting and exercising) and abdominal pain. Primary symptoms do not include fever, fatigue, nausea, vomiting, diarrhea, melena, hematemesis, hematochezia or dysuria.   The abdominal pain began more than 2 days ago (chronic intermittent for several years). The abdominal pain has been unchanged since its onset. The abdominal pain is located in the epigastric region and LUQ (described as cramping/spasms, like a "sanna horse"; once every few days; lasts for 2-3 minutes; denies any recent traumas/falls; picking up some rugs lately and having some back spasms from it). The severity of the abdominal pain is 0/10 (currently). Relieved by: with standing upright; triggered by bending forward.   The illness is also significant for bloating (occasional) and constipation (chronic for several years; bowel movements are once a day of formed stool; TREATMENT:colace daily, miralax PRN, PAST TREATMENT: suppository PRN due to unsuccessful rectocele repair, enemas help, dulcolax, linzess- doesn't remember taking it). The illness does not include chills, dysphagia or odynophagia. Significant associated medical issues include GERD (daily; prilosec 40 mg once daily prn- taking 3-4 times a month, Tums PRN- daily, mylanta prn- taking a few times a week; PAST TREATMENTS: protonix, zantac), bowel resection (colectomy due to endometriosis & rectocele repair), irritable bowel syndrome (PAST TREATMENT: bentyl), hemorrhoids and diverticulitis. " Associated medical issues do not include inflammatory bowel disease.       Review of Systems   Constitutional:  Positive for weight loss (trying to lose weight with dieting and exercising). Negative for appetite change, chills, fatigue and fever.   HENT:  Negative for sore throat and trouble swallowing.    Respiratory:  Negative for cough, choking and shortness of breath.    Cardiovascular:  Negative for chest pain.   Gastrointestinal:  Positive for abdominal pain, bloating (occasional) and constipation (chronic for several years; bowel movements are once a day of formed stool; TREATMENT:colace daily, miralax PRN, PAST TREATMENT: suppository PRN due to unsuccessful rectocele repair, enemas help, dulcolax, linzess- doesn't remember taking it). Negative for anal bleeding, blood in stool, diarrhea, dysphagia, hematemesis, hematochezia, melena, nausea, rectal pain and vomiting.   Genitourinary:  Negative for difficulty urinating, dysuria and flank pain.   Neurological:  Negative for weakness.       Patient's last menstrual period was 10/07/1976. s/p hysterectomy    Past Medical History:   Diagnosis Date    Anesthesia complication     elevated BP    Colon polyp     Coronary artery disease     Diabetes mellitus     Diverticulitis     Diverticulosis     Encounter for blood transfusion     Fatty liver 04/25/2016    Gallbladder polyp     GERD (gastroesophageal reflux disease)     Hyperlipidemia     Hypertension     Hypothyroid     Irritable bowel syndrome     Kidney stone     Liver hemangioma     Partial bowel obstruction     Seasonal allergies     Thyroid disease      Past Surgical History:   Procedure Laterality Date    ABDOMINAL SURGERY      APPENDECTOMY      CARDIAC CATHETERIZATION      2 stents    CHOLECYSTECTOMY  04/04/2019    Dr. Ortiz: report and biopsy sent for scanning    COLECTOMY      endometriosis- 8.5 inches removed    COLONOSCOPY  2016    repeat in 5    COLONOSCOPY N/A 06/27/2016    Procedure: COLONOSCOPY;   Surgeon: Avel Luna Jr., MD;  Location: [facility];  Service: Endoscopy;  Laterality: N/A; repeat in 5 years for surveillance    COLONOSCOPY N/A 07/27/2020    Procedure: COLONOSCOPY;  Surgeon: Avel Luna Jr., MD;  Location: [facility];  Service: Endoscopy;  Laterality: N/A; Irritable bowel syndrome(?). hemorrhoids, 2 colon polyps removed, diverticulosis, repeat in 5 years for surveillance. biopsy: Hyperplastic polyp    CORONARY ANGIOPLASTY WITH STENT PLACEMENT      x 2    EGD - EXTERNAL RESULT  03/20/2019    Dr. Ortiz, sent for scanning: biopsy: random stomach- mild nonspecific inflammation, negative h pylori; GEJ- mild chroni inflammation, negative for merida's esophagus    EGD - EXTERNAL RESULT  08/17/2022    Dr. Ortiz, sent for scanning: reflux esophagitis, stricture at the lower end of esophagus- dilation performed, spasm at lower end of esophagus, acute on chronic gastritis, polyps of the stomach removed, and spasm of pylorus, small lipoma of stomach    ESOPHAGOGASTRODUODENOSCOPY N/A 07/27/2020    Procedure: EGD (ESOPHAGOGASTRODUODENOSCOPY);  Surgeon: Avel Luna Jr., MD;  Location: [facility];  Service: Endoscopy;  Laterality: N/A; unremarkable; biopsy: stomach- Mild chronic gastritis, negative for h pylori. duodenum WNL    HYSTERECTOMY      INCONTINENCE SURGERY      INJECTION OF ANESTHETIC AGENT INTO SACROILIAC JOINT Right 07/03/2019    Procedure: BLOCK, SACROILIAC JOINT;  Surgeon: Homer Diamond MD;  Location: [facility];  Service: Pain Management;  Laterality: Right;    INJECTION OF FACET JOINT Right 08/28/2019    Procedure: INJECTION, FACET JOINT;  Surgeon: Homer Diamond MD;  Location: [facility];  Service: Pain Management;  Laterality: Right;  L4 and L5    JOINT REPLACEMENT Right     Knee    OOPHORECTOMY      rectocele  05/2017    repair    Stent OM      UPPER GASTROINTESTINAL ENDOSCOPY  6/2013 Dr. Peters    reflux esophagitis; biopsy: negative dysplasia, intestinal metaplasia;  mild chronic inflammation- GERD related & regenerative glandular epithelial change; strips of squamous esophageal mucosa with moderate basal epithelial cell hyperplasia and rare intraepithelial eosinophils (< 1 per 10 high power fields)    UPPER GASTROINTESTINAL ENDOSCOPY  2016    Dr. de la cruz    URETHRA SURGERY       Family History   Problem Relation Age of Onset    Clotting disorder Maternal Grandmother     Diverticulitis Maternal Grandfather     Anesthesia problems Neg Hx     Breast cancer Neg Hx     Ovarian cancer Neg Hx     Colon cancer Neg Hx     Colon polyps Neg Hx     Crohn's disease Neg Hx     Ulcerative colitis Neg Hx     Glaucoma Neg Hx     Macular degeneration Neg Hx      Social History     Tobacco Use    Smoking status: Former     Current packs/day: 0.00     Average packs/day: 0.3 packs/day for 35.0 years (8.8 ttl pk-yrs)     Types: Cigarettes     Start date: 10/7/1964     Quit date: 10/7/1999     Years since quittin.1    Smokeless tobacco: Never   Substance Use Topics    Alcohol use: No    Drug use: No     Wt Readings from Last 10 Encounters:   23 75.7 kg (166 lb 14.2 oz)   23 76.8 kg (169 lb 5 oz)   23 77.7 kg (171 lb 4.8 oz)   22 78.5 kg (173 lb 1 oz)   22 78.5 kg (173 lb)   21 78.6 kg (173 lb 4.5 oz)   21 80.5 kg (177 lb 7.5 oz)   20 80.1 kg (176 lb 9.4 oz)   10/29/20 80 kg (176 lb 5.9 oz)   10/17/20 79.6 kg (175 lb 7.8 oz)     Lab Results   Component Value Date    WBC 10.21 10/17/2020    HGB 13.8 10/17/2020    HCT 42.6 10/17/2020    MCV 92 10/17/2020     10/17/2020     CMP  Sodium   Date Value Ref Range Status   10/17/2020 139 136 - 145 mmol/L Final     Potassium   Date Value Ref Range Status   10/17/2020 4.3 3.5 - 5.1 mmol/L Final     Chloride   Date Value Ref Range Status   10/17/2020 103 95 - 110 mmol/L Final     CO2   Date Value Ref Range Status   10/17/2020 28 22 - 31 mmol/L Final     Glucose   Date Value Ref Range Status   10/17/2020  228 (H) 70 - 110 mg/dL Final     Comment:     The ADA recommends the following guidelines for fasting glucose:  Normal:       less than 100 mg/dL  Prediabetes:  100 mg/dL to 125 mg/dL  Diabetes:     126 mg/dL or higher       BUN   Date Value Ref Range Status   10/17/2020 11 7 - 18 mg/dL Final     Creatinine   Date Value Ref Range Status   06/23/2022 0.8 0.5 - 1.4 mg/dL Final     Calcium   Date Value Ref Range Status   10/17/2020 9.7 8.4 - 10.2 mg/dL Final     Total Protein   Date Value Ref Range Status   10/17/2020 7.8 6.0 - 8.4 g/dL Final     Albumin   Date Value Ref Range Status   10/17/2020 4.6 3.5 - 5.2 g/dL Final     Total Bilirubin   Date Value Ref Range Status   10/17/2020 0.5 0.2 - 1.3 mg/dL Final     Alkaline Phosphatase   Date Value Ref Range Status   10/17/2020 110 38 - 145 U/L Final     AST   Date Value Ref Range Status   10/17/2020 34 14 - 36 U/L Final     ALT   Date Value Ref Range Status   10/17/2020 38 (H) 0 - 35 U/L Final     Anion Gap   Date Value Ref Range Status   10/17/2020 8 8 - 16 mmol/L Final     eGFR if    Date Value Ref Range Status   06/23/2022 >60 >60 mL/min/1.73 m^2 Final     eGFR if non    Date Value Ref Range Status   06/23/2022 >60 >60 mL/min/1.73 m^2 Final     Comment:     Calculation used to obtain the estimated glomerular filtration  rate (eGFR) is the CKD-EPI equation.        Lab Results   Component Value Date    OCCULTBLOOD Negative 10/17/2020     Lab Results   Component Value Date    LIPASE 37 06/15/2020     Lab Results   Component Value Date    LIPASERES 131 10/17/2020     Lab Results   Component Value Date    AMYLASE 82 04/13/2016     6/15/2021 stool studies reviewed (occasional yeast)    Reviewed prior medical records including radiology report of 6/23/2022 and 10/17/2020 CT scans of abdomen pelvis, 3/13/2019 HIDA scan; 3/4/2019 abdominal ultrasound, 5/10/2017 MRI abdomen; & endoscopy history (see surgical history).    Previously reviewed  medical records received from Dr. Ortiz, summarized below and in medical & surgical history (endoscopies, etc), copies made & given to nurse to be scanned into system:   Visit notes: 4/1/2019, 4/15/2019, 6/8/2022, 6/30/2022, 7/13/2022, 8/1/2022, 12/15/2022, 2/16/2023  3/12/2019 discharge summary  Lab results (see records for full results): 6/9/2022 CMP with ALT 38 (H) otherwise LFTs WNL; CBC with H&H 15.2 & 48.3 (H),WBC WNL and platelet count WNL, TSH WNL, ESR WNL  Diagnoses: GERD, bowel adhesions, diverticulosis, partial bowel obstruction  EGDs: 3/20/19 & 8/17/22  Objective:      Physical Exam  Vitals and nursing note reviewed.   Constitutional:       General: She is not in acute distress.     Appearance: Normal appearance. She is well-developed. She is not diaphoretic.   HENT:      Mouth/Throat:      Lips: Pink. No lesions.      Mouth: Mucous membranes are moist. No oral lesions.      Tongue: No lesions.      Pharynx: Oropharynx is clear. No pharyngeal swelling or posterior oropharyngeal erythema.   Eyes:      General: No scleral icterus.     Conjunctiva/sclera: Conjunctivae normal.      Pupils: Pupils are equal, round, and reactive to light.   Pulmonary:      Effort: Pulmonary effort is normal. No respiratory distress.      Breath sounds: Normal breath sounds. No wheezing.   Abdominal:      General: Bowel sounds are normal. There is no distension or abdominal bruit.      Palpations: Abdomen is soft. Abdomen is not rigid. There is no mass.      Tenderness: There is no abdominal tenderness. There is no guarding or rebound. Negative signs include Leyva's sign and McBurney's sign.   Skin:     General: Skin is warm and dry.      Coloration: Skin is not jaundiced or pale.      Findings: No erythema or rash.   Neurological:      Mental Status: She is alert and oriented to person, place, and time.   Psychiatric:         Behavior: Behavior normal.         Thought Content: Thought content normal.         Judgment:  Judgment normal.         Assessment:       1. Hepatic steatosis    2. Liver hemangioma    3. Hepatomegaly    4. Elevated ALT measurement    5. Upper abdominal pain    6. Heartburn    7. History of gastroesophageal reflux (GERD)    8. History of chronic constipation        Plan:       Hepatic steatosis, Liver hemangioma, Hepatomegaly, &   Elevated ALT measurement  -     Ambulatory referral/consult to Hepatology; Future; Expected date: 11/15/2023  - complete abdominal ultrasound & blood work (CBC, lipase and hepatic function panel) as previously ordered  For fatty liver recommend: low fat, low cholesterol diet, maintain good control of blood sugars and cholesterol levels, exercise, weight loss (if overweight), minimize/avoid alcohol and tylenol products, & follow-up with hepatology for continued evaluation and management.    Upper abdominal pain  - complete abdominal ultrasound & blood work (CBC, lipase and hepatic function panel) as previously ordered  - recommended scheduling EGD, discussed procedure with patient, including risks and benefits, patient verbalized understanding but patient declined scheduling EGD at this time  - avoid/minimize use of NSAIDs- since they can cause GI upset, bleeding and/or ulcers. If NSAID must be taken, recommend take with food.  - Recommend follow-up with Primary Care Provider for continued evaluation and management of non-GI related etiologies (possible musculoskeletal).    Heartburn & History of gastroesophageal reflux (GERD)  - recommended scheduling EGD, discussed procedure with patient, including risks and benefits, patient verbalized understanding but patient declined scheduling EGD at this time  - avoid/minimize use of NSAIDs- since they can cause GI upset, bleeding and/or ulcers. If NSAID must be taken, recommend take with food.  -  TAKE   omeprazole (PRILOSEC) 40 MG capsule; Take 1 capsule (40 mg total) by mouth before evening meal. Recommend taking daily rather than prn at  this time due to daily symptoms  - discussed with patient about long term use of reflux medications (preference to use lowest effective dose or discontinuing if possible), the risk and benefits of using these medications long term, the risk of untreated GERD such as merida's esophagus, and recommend a diet high in calcium and/or taking OTC calcium and vitamin d supplements as directed (such as Citracal +D), patient verbalized understanding & patient will try daily PPI at this time.  - recommend annual monitoring with blood work to include CMP, CBC, vitamin B12, and magnesium.    History of chronic constipation  Recommend daily exercise as tolerated, adequate water intake (six 8-oz glasses of water daily), and high fiber diet. OTC fiber supplements are recommended if diet does not reach daily fiber goal (20-30 grams daily), such as Metamucil, Citrucel, or FiberCon (take as directed, separate from other oral medications by >2 hours).  - CONTINUE OTC stool softener such as Colace as directed to avoid hard stools and straining with bowel movements PRN  -Recommend trying OTC MiraLax once daily (17g PO) as directed PRN persistent constipation  -If still no improvement with these measures, call/follow-up    Follow up in about 1 month (around 12/8/2023), or if symptoms worsen or fail to improve.    If no improvement in symptoms or symptoms worsen, call/follow-up at clinic or go to ER.        34 minutes of total time spent on the encounter, which includes face to face time and non-face to face time preparing to see the patient (e.g., review of tests), Obtaining and/or reviewing separately obtained history, Documenting clinical information in the electronic or other health record, Independently interpreting results (not separately reported) and communicating results to the patient/family/caregiver, or Care coordination (not separately reported)

## 2023-11-17 ENCOUNTER — HOSPITAL ENCOUNTER (OUTPATIENT)
Dept: RADIOLOGY | Facility: HOSPITAL | Age: 73
Discharge: HOME | End: 2023-11-17
Attending: NURSE PRACTITIONER
Payer: MEDICARE

## 2023-11-17 ENCOUNTER — TELEPHONE (OUTPATIENT)
Dept: GASTROENTEROLOGY | Facility: CLINIC | Age: 73
End: 2023-11-17
Payer: MEDICARE

## 2023-11-17 DIAGNOSIS — K76.0 FATTY LIVER: Primary | ICD-10-CM

## 2023-11-17 DIAGNOSIS — R74.01 ELEVATED ALT MEASUREMENT: ICD-10-CM

## 2023-11-17 DIAGNOSIS — D18.03 LIVER HEMANGIOMA: ICD-10-CM

## 2023-11-17 DIAGNOSIS — K76.0 HEPATIC STEATOSIS: ICD-10-CM

## 2023-11-17 DIAGNOSIS — R10.12 LUQ PAIN: ICD-10-CM

## 2023-11-17 DIAGNOSIS — R79.89 ELEVATED LFTS: ICD-10-CM

## 2023-11-17 DIAGNOSIS — R10.13 EPIGASTRIC PAIN: ICD-10-CM

## 2023-11-17 PROCEDURE — 76700 US EXAM ABDOM COMPLETE: CPT | Mod: 26,,, | Performed by: RADIOLOGY

## 2023-11-17 PROCEDURE — 76700 US ABDOMEN COMPLETE: ICD-10-PCS | Mod: 26,,, | Performed by: RADIOLOGY

## 2023-11-17 PROCEDURE — 76700 US EXAM ABDOM COMPLETE: CPT | Mod: TC,PO

## 2023-11-17 NOTE — TELEPHONE ENCOUNTER
Please call to inform & review the results with the patient- radiology report of the abdominal ultrasound showed fatty liver. Blood work showed mild elevations in some of the liver function levels. Unremarkable blood counts (no anemia) and normal pancreatic enzyme.  Recommend: low fat, low cholesterol diet, maintain good control of blood sugars and cholesterol levels, exercise, weight loss (if overweight), minimize/avoid alcohol and tylenol products, & follow-up with hepatology for continued evaluation and management (referral placed at our last appointment).    Continue with previous recommendations. If no improvement in symptoms or symptoms worsen, call/follow-up at clinic or go to ER.    Thanks,

## 2023-11-20 NOTE — TELEPHONE ENCOUNTER
Spoke with pt. Informed of results & recommendations per Lesvia Azevedo. Pt verbalized understanding.

## 2023-11-24 ENCOUNTER — TELEPHONE (OUTPATIENT)
Dept: TRANSPLANT | Facility: CLINIC | Age: 73
End: 2023-11-24
Payer: MEDICARE

## 2023-11-24 NOTE — TELEPHONE ENCOUNTER
Attempted to call patient to schedule appointment in hepatology clinic.   No answer to phone, unable to leave vmail.

## 2023-11-30 ENCOUNTER — TELEPHONE (OUTPATIENT)
Dept: GASTROENTEROLOGY | Facility: CLINIC | Age: 73
End: 2023-11-30
Payer: MEDICARE

## 2023-11-30 NOTE — TELEPHONE ENCOUNTER
----- Message from Robyn Wheat sent at 11/30/2023 10:25 AM CST -----  Contact: self  Type: Needs Medical Advice  Who Called:  pt  Best Call Back Number: [phone]    Additional Information: please have dr moore call the pt she would like to speak with her personally

## 2023-12-12 ENCOUNTER — TELEPHONE (OUTPATIENT)
Dept: HEPATOLOGY | Facility: CLINIC | Age: 73
End: 2023-12-12
Payer: MEDICARE

## 2023-12-12 NOTE — TELEPHONE ENCOUNTER
Spoke with patient regarding 12/14 appt with Dr. Le. Informed patient that appointment will need to be rescheduled. Patient states she wanted to call states her  has COVID and wanted to cancel anyway. Informed patient we will call her with New appointment date and time. Patient verbalized understanding.

## 2023-12-26 ENCOUNTER — HOSPITAL ENCOUNTER (OUTPATIENT)
Dept: RADIOLOGY | Facility: HOSPITAL | Age: 73
Discharge: HOME | End: 2023-12-26
Attending: SURGERY
Payer: MEDICARE

## 2023-12-26 DIAGNOSIS — U07.1 CLINICAL DIAGNOSIS OF COVID-19: ICD-10-CM

## 2023-12-26 PROCEDURE — 71046 X-RAY EXAM CHEST 2 VIEWS: CPT | Mod: TC,FY,PO

## 2023-12-26 PROCEDURE — 71046 XR CHEST PA AND LATERAL: ICD-10-PCS | Mod: 26,,, | Performed by: RADIOLOGY

## 2023-12-26 PROCEDURE — 71046 X-RAY EXAM CHEST 2 VIEWS: CPT | Mod: 26,,, | Performed by: RADIOLOGY

## 2024-01-04 ENCOUNTER — LAB VISIT (OUTPATIENT)
Dept: LAB | Facility: HOSPITAL | Age: 74
End: 2024-01-04
Payer: MEDICARE

## 2024-01-04 DIAGNOSIS — I25.10 CORONARY ATHEROSCLEROSIS OF NATIVE CORONARY ARTERY: ICD-10-CM

## 2024-01-04 DIAGNOSIS — I25.84 CORONARY ATHEROSCLEROSIS DUE TO SEVERELY CALCIFIED CORONARY LESION: ICD-10-CM

## 2024-01-04 LAB
ANION GAP SERPL CALC-SCNC: 12 MMOL/L (ref 8–16)
BUN SERPL-MCNC: 15 MG/DL (ref 8–23)
CALCIUM SERPL-MCNC: 9.7 MG/DL (ref 8.7–10.5)
CHLORIDE SERPL-SCNC: 98 MMOL/L (ref 95–110)
CO2 SERPL-SCNC: 24 MMOL/L (ref 23–29)
CREAT SERPL-MCNC: 0.8 MG/DL (ref 0.5–1.4)
EST. GFR  (NO RACE VARIABLE): >60 ML/MIN/1.73 M^2
GLUCOSE SERPL-MCNC: 255 MG/DL (ref 70–110)
POTASSIUM SERPL-SCNC: 4.7 MMOL/L (ref 3.5–5.1)
SODIUM SERPL-SCNC: 134 MMOL/L (ref 136–145)

## 2024-01-04 PROCEDURE — 36415 COLL VENOUS BLD VENIPUNCTURE: CPT | Mod: PO | Performed by: INTERNAL MEDICINE

## 2024-01-04 PROCEDURE — 80048 BASIC METABOLIC PNL TOTAL CA: CPT | Performed by: INTERNAL MEDICINE

## 2024-02-08 ENCOUNTER — LAB VISIT (OUTPATIENT)
Dept: LAB | Facility: HOSPITAL | Age: 74
End: 2024-02-08
Payer: MEDICARE

## 2024-02-08 DIAGNOSIS — I25.84 CORONARY ATHEROSCLEROSIS DUE TO SEVERELY CALCIFIED CORONARY LESION: ICD-10-CM

## 2024-02-08 DIAGNOSIS — I25.10 CORONARY ATHEROSCLEROSIS OF NATIVE CORONARY ARTERY: ICD-10-CM

## 2024-02-08 LAB
ANION GAP SERPL CALC-SCNC: 10 MMOL/L (ref 8–16)
BUN SERPL-MCNC: 18 MG/DL (ref 8–23)
CALCIUM SERPL-MCNC: 9.8 MG/DL (ref 8.7–10.5)
CHLORIDE SERPL-SCNC: 100 MMOL/L (ref 95–110)
CO2 SERPL-SCNC: 26 MMOL/L (ref 23–29)
CREAT SERPL-MCNC: 0.8 MG/DL (ref 0.5–1.4)
EST. GFR  (NO RACE VARIABLE): >60 ML/MIN/1.73 M^2
GLUCOSE SERPL-MCNC: 162 MG/DL (ref 70–110)
POTASSIUM SERPL-SCNC: 4.6 MMOL/L (ref 3.5–5.1)
SODIUM SERPL-SCNC: 136 MMOL/L (ref 136–145)

## 2024-02-08 PROCEDURE — 36415 COLL VENOUS BLD VENIPUNCTURE: CPT | Mod: PO

## 2024-02-08 PROCEDURE — 80048 BASIC METABOLIC PNL TOTAL CA: CPT

## 2024-02-21 ENCOUNTER — TELEPHONE (OUTPATIENT)
Dept: OBSTETRICS AND GYNECOLOGY | Facility: CLINIC | Age: 74
End: 2024-02-21
Payer: MEDICARE

## 2024-02-21 DIAGNOSIS — L23.9 ALLERGIC CONTACT DERMATITIS, UNSPECIFIED TRIGGER: ICD-10-CM

## 2024-02-21 DIAGNOSIS — L90.0 LICHEN SCLEROSUS ET ATROPHICUS: ICD-10-CM

## 2024-02-21 RX ORDER — CLOBETASOL PROPIONATE 0.5 MG/G
CREAM TOPICAL
Qty: 30 G | Refills: 1 | Status: SHIPPED | OUTPATIENT
Start: 2024-02-21

## 2024-02-21 NOTE — TELEPHONE ENCOUNTER
----- Message from Rachna Dunn sent at 2/21/2024  3:22 PM CST -----  Contact: pt  Type:  RX Refill Request    Who Called: pt    Refill or New Rx: refill    RX Name and Strength:clobetasoL (TEMOVATE) 0.05 % cream    How is the patient currently taking it? (ex. 1XDay):as directed    Is this a 30 day or 90 day RX:30    Preferred Pharmacy with phone number:  [id number] - [place] [id number] AT [facility] [address] [address]  Phone: [phone] Fax: [phone]      Local or Mail Order:local     Ordering Provider:Carlee    Would the patient rather a call back or a response via [facility]? Call back    Best Call Back Number:[phone]    Additional Information: please refill script    Thanks

## 2024-03-07 NOTE — TELEPHONE ENCOUNTER
----- Message from Rylie Resendiz sent at 1/23/2017  9:27 AM CST -----  Contact: self  Patient [phone] is calling/she states that she is having some inflammation and is asking to speak with the Nurse about getting an appt this week with Dr Coronel or any doctor/please advise  
Appointment scheduled.   
never true

## 2024-05-21 ENCOUNTER — OFFICE VISIT (OUTPATIENT)
Dept: GASTROENTEROLOGY | Facility: CLINIC | Age: 74
End: 2024-05-21
Payer: MEDICARE

## 2024-05-21 VITALS — WEIGHT: 168.88 LBS | HEIGHT: 55 IN | BODY MASS INDEX: 39.08 KG/M2 | RESPIRATION RATE: 17 BRPM

## 2024-05-21 DIAGNOSIS — R16.0 HEPATOMEGALY: ICD-10-CM

## 2024-05-21 DIAGNOSIS — R19.7 INTERMITTENT DIARRHEA: ICD-10-CM

## 2024-05-21 DIAGNOSIS — K76.0 HEPATIC STEATOSIS: ICD-10-CM

## 2024-05-21 DIAGNOSIS — R12 HEARTBURN: Primary | ICD-10-CM

## 2024-05-21 DIAGNOSIS — Z87.19 HISTORY OF CHRONIC CONSTIPATION: ICD-10-CM

## 2024-05-21 DIAGNOSIS — R10.11 RUQ ABDOMINAL PAIN: ICD-10-CM

## 2024-05-21 DIAGNOSIS — R79.89 ELEVATED LFTS: ICD-10-CM

## 2024-05-21 PROCEDURE — 3288F FALL RISK ASSESSMENT DOCD: CPT | Mod: CPTII,S$GLB,, | Performed by: NURSE PRACTITIONER

## 2024-05-21 PROCEDURE — 1125F AMNT PAIN NOTED PAIN PRSNT: CPT | Mod: CPTII,S$GLB,, | Performed by: NURSE PRACTITIONER

## 2024-05-21 PROCEDURE — 1160F RVW MEDS BY RX/DR IN RCRD: CPT | Mod: CPTII,S$GLB,, | Performed by: NURSE PRACTITIONER

## 2024-05-21 PROCEDURE — 4010F ACE/ARB THERAPY RXD/TAKEN: CPT | Mod: CPTII,S$GLB,, | Performed by: NURSE PRACTITIONER

## 2024-05-21 PROCEDURE — 1101F PT FALLS ASSESS-DOCD LE1/YR: CPT | Mod: CPTII,S$GLB,, | Performed by: NURSE PRACTITIONER

## 2024-05-21 PROCEDURE — 99999 PR PBB SHADOW E&M-EST. PATIENT-LVL IV: CPT | Mod: PBBFAC,,, | Performed by: NURSE PRACTITIONER

## 2024-05-21 PROCEDURE — 99214 OFFICE O/P EST MOD 30 MIN: CPT | Mod: S$GLB,,, | Performed by: NURSE PRACTITIONER

## 2024-05-21 PROCEDURE — 1159F MED LIST DOCD IN RCRD: CPT | Mod: CPTII,S$GLB,, | Performed by: NURSE PRACTITIONER

## 2024-05-21 RX ORDER — OMEPRAZOLE 40 MG/1
CAPSULE, DELAYED RELEASE ORAL
Qty: 150 CAPSULE | Refills: 0 | Status: SHIPPED | OUTPATIENT
Start: 2024-05-21 | End: 2024-09-18

## 2024-05-21 RX ORDER — SUCRALFATE 1 G/1
1 TABLET ORAL 4 TIMES DAILY PRN
COMMUNITY

## 2024-05-21 NOTE — PROGRESS NOTES
Subjective:       Patient ID: Herminia Wooten is a 73 y.o. female Body mass index is 49.45 kg/m².    Chief Complaint: Abdominal Pain  Established patient of MARISA Mccloud NP, & myself.    Patient has not seen hepatology as previously referred.    GI Problem  The primary symptoms include abdominal pain and diarrhea (intermittent loose stools; occurs once every 2 weeks or so; triggered by certain foods (mexican food)). Primary symptoms do not include fever, weight loss (stable since our last visit), fatigue, nausea, vomiting, melena, hematemesis, jaundice, hematochezia or dysuria.   The abdominal pain began more than 2 days ago (~ 2 weeks ago after overeating at [employer], saw her PCP and he started her on carafate 1 gram QID, she is only taking it PRN). The abdominal pain has been unchanged since its onset. The abdominal pain is located in the RUQ (described as needle like pain). The abdominal pain radiates to the back. The severity of the abdominal pain is 4/10 (currently). Relieved by: worse with increased activity, mexican food.   The illness is also significant for bloating (occasional) and constipation (chronic for years; bowel movements are once a day of formed stool; TREATMENT:colace daily, miralax PRN, high fiber diet, PAST TREATMENT: suppository- unsuccessful rectocele repair, enemas help, dulcolax, linzess- doesn't remember taking it). The illness does not include chills, dysphagia or odynophagia. Significant associated medical issues include GERD (frequent; prilosec 40 mg once daily, Tums PRN- daily, mylanta prn- taking a few times a week, carafate 1 gram QID PRN; PAST TREATMENTS: protonix, zantac), bowel resection (colectomy due to endometriosis & rectocele repair), irritable bowel syndrome (PAST TREATMENT: bentyl), hemorrhoids and diverticulitis. Associated medical issues do not include inflammatory bowel disease.     Review of Systems   Constitutional:  Negative for appetite change, chills,  fatigue, fever and weight loss (stable since our last visit).        Reports frequent NSAID use of ibuprofen recently   HENT:  Negative for sore throat and trouble swallowing.    Respiratory:  Negative for cough, choking and shortness of breath.    Cardiovascular:  Negative for chest pain.   Gastrointestinal:  Positive for abdominal pain, bloating (occasional), constipation (chronic for years; bowel movements are once a day of formed stool; TREATMENT:colace daily, miralax PRN, high fiber diet, PAST TREATMENT: suppository- unsuccessful rectocele repair, enemas help, dulcolax, linzess- doesn't remember taking it) and diarrhea (intermittent loose stools; occurs once every 2 weeks or so; triggered by certain foods (mexican food)). Negative for anal bleeding, blood in stool, dysphagia, hematemesis, hematochezia, jaundice, melena, nausea, rectal pain and vomiting.   Genitourinary:  Negative for difficulty urinating, dysuria and flank pain.   Neurological:  Negative for weakness.       Patient's last menstrual period was 10/07/1976. s/p hysterectomy    Past Medical History:   Diagnosis Date    Anesthesia complication     elevated BP    Colon polyp     Coronary artery disease     Diabetes mellitus     Diverticulitis     Diverticulosis     Encounter for blood transfusion     Fatty liver 04/25/2016    Gallbladder polyp     GERD (gastroesophageal reflux disease)     Hyperlipidemia     Hypertension     Hypothyroid     Irritable bowel syndrome     Kidney stone     Liver hemangioma     Partial bowel obstruction     Seasonal allergies     Thyroid disease      Past Surgical History:   Procedure Laterality Date    ABDOMINAL SURGERY      APPENDECTOMY      CARDIAC CATHETERIZATION      2 stents    CHOLECYSTECTOMY  04/04/2019    Dr. Ortiz: report and biopsy sent for scanning    COLECTOMY      endometriosis- 8.5 inches removed    COLONOSCOPY  2016    repeat in 5    COLONOSCOPY N/A 06/27/2016    Procedure: COLONOSCOPY;  Surgeon: Avel  ROSARIO Luna Jr., MD;  Location: [facility];  Service: Endoscopy;  Laterality: N/A; repeat in 5 years for surveillance    COLONOSCOPY N/A 07/27/2020    Procedure: COLONOSCOPY;  Surgeon: Avel Luna Jr., MD;  Location: [facility];  Service: Endoscopy;  Laterality: N/A; Irritable bowel syndrome(?). hemorrhoids, 2 colon polyps removed, diverticulosis, repeat in 5 years for surveillance. biopsy: Hyperplastic polyp    CORONARY ANGIOPLASTY WITH STENT PLACEMENT      x 2    EGD - EXTERNAL RESULT  03/20/2019    Dr. Ortiz, sent for scanning: biopsy: random stomach- mild nonspecific inflammation, negative h pylori; GEJ- mild chroni inflammation, negative for merida's esophagus    EGD - EXTERNAL RESULT  08/17/2022    Dr. Ortiz, sent for scanning: reflux esophagitis, stricture at the lower end of esophagus- dilation performed, spasm at lower end of esophagus, acute on chronic gastritis, polyps of the stomach removed, and spasm of pylorus, small lipoma of stomach    ESOPHAGOGASTRODUODENOSCOPY N/A 07/27/2020    Procedure: EGD (ESOPHAGOGASTRODUODENOSCOPY);  Surgeon: Avel Luna Jr., MD;  Location: [facility];  Service: Endoscopy;  Laterality: N/A; unremarkable; biopsy: stomach- Mild chronic gastritis, negative for h pylori. duodenum WNL    HYSTERECTOMY      INCONTINENCE SURGERY      INJECTION OF ANESTHETIC AGENT INTO SACROILIAC JOINT Right 07/03/2019    Procedure: BLOCK, SACROILIAC JOINT;  Surgeon: Homer Diamond MD;  Location: [facility];  Service: Pain Management;  Laterality: Right;    INJECTION OF FACET JOINT Right 08/28/2019    Procedure: INJECTION, FACET JOINT;  Surgeon: Homer Diamond MD;  Location: [facility];  Service: Pain Management;  Laterality: Right;  L4 and L5    JOINT REPLACEMENT Right     Knee    OOPHORECTOMY      rectocele  05/2017    repair    Stent OM      UPPER GASTROINTESTINAL ENDOSCOPY  6/2013 Dr. Peters    reflux esophagitis; biopsy: negative dysplasia, intestinal metaplasia; mild chronic  inflammation- GERD related & regenerative glandular epithelial change; strips of squamous esophageal mucosa with moderate basal epithelial cell hyperplasia and rare intraepithelial eosinophils (< 1 per 10 high power fields)    UPPER GASTROINTESTINAL ENDOSCOPY  2016    Dr. de la cruz    URETHRA SURGERY       Family History   Problem Relation Name Age of Onset    Clotting disorder Maternal Grandmother      Diverticulitis Maternal Grandfather      Anesthesia problems Neg Hx      Breast cancer Neg Hx      Ovarian cancer Neg Hx      Colon cancer Neg Hx      Colon polyps Neg Hx      Crohn's disease Neg Hx      Ulcerative colitis Neg Hx      Glaucoma Neg Hx      Macular degeneration Neg Hx       Social History     Tobacco Use    Smoking status: Former     Current packs/day: 0.00     Average packs/day: 0.3 packs/day for 35.0 years (8.8 ttl pk-yrs)     Types: Cigarettes     Start date: 10/7/1964     Quit date: 10/7/1999     Years since quittin.6    Smokeless tobacco: Never   Substance Use Topics    Alcohol use: No    Drug use: No     Wt Readings from Last 10 Encounters:   24 76.6 kg (168 lb 14 oz)   23 75.7 kg (166 lb 14.2 oz)   23 76.8 kg (169 lb 5 oz)   23 77.7 kg (171 lb 4.8 oz)   22 78.5 kg (173 lb 1 oz)   22 78.5 kg (173 lb)   21 78.6 kg (173 lb 4.5 oz)   21 80.5 kg (177 lb 7.5 oz)   20 80.1 kg (176 lb 9.4 oz)   10/29/20 80 kg (176 lb 5.9 oz)     Lab Results   Component Value Date    WBC 7.35 2023    HGB 15.2 2023    HCT 47.7 2023    MCV 91 2023     2023     CMP  Sodium   Date Value Ref Range Status   2024 136 136 - 145 mmol/L Final     Potassium   Date Value Ref Range Status   2024 4.6 3.5 - 5.1 mmol/L Final     Chloride   Date Value Ref Range Status   2024 100 95 - 110 mmol/L Final     CO2   Date Value Ref Range Status   2024 26 23 - 29 mmol/L Final     Glucose   Date Value Ref Range Status    02/08/2024 162 (H) 70 - 110 mg/dL Final     BUN   Date Value Ref Range Status   02/08/2024 18 8 - 23 mg/dL Final     Creatinine   Date Value Ref Range Status   02/08/2024 0.8 0.5 - 1.4 mg/dL Final     Calcium   Date Value Ref Range Status   02/08/2024 9.8 8.7 - 10.5 mg/dL Final     Total Protein   Date Value Ref Range Status   11/08/2023 7.0 6.0 - 8.4 g/dL Final     Albumin   Date Value Ref Range Status   11/08/2023 4.1 3.5 - 5.2 g/dL Final     Total Bilirubin   Date Value Ref Range Status   11/08/2023 0.4 0.1 - 1.0 mg/dL Final     Comment:     For infants and newborns, interpretation of results should be based  on gestational age, weight and in agreement with clinical  observations.    Premature Infant recommended reference ranges:  Up to 24 hours.............<8.0 mg/dL  Up to 48 hours............<12.0 mg/dL  3-5 days..................<15.0 mg/dL  6-29 days.................<15.0 mg/dL       Alkaline Phosphatase   Date Value Ref Range Status   11/08/2023 137 (H) 55 - 135 U/L Final     AST   Date Value Ref Range Status   11/08/2023 36 10 - 40 U/L Final     ALT   Date Value Ref Range Status   11/08/2023 45 (H) 10 - 44 U/L Final     Anion Gap   Date Value Ref Range Status   02/08/2024 10 8 - 16 mmol/L Final     eGFR if    Date Value Ref Range Status   06/23/2022 >60 >60 mL/min/1.73 m^2 Final     eGFR if non    Date Value Ref Range Status   06/23/2022 >60 >60 mL/min/1.73 m^2 Final     Comment:     Calculation used to obtain the estimated glomerular filtration  rate (eGFR) is the CKD-EPI equation.        Lab Results   Component Value Date    OCCULTBLOOD Negative 10/17/2020     Lab Results   Component Value Date    LIPASE 34 11/08/2023     Lab Results   Component Value Date    LIPASERES 131 10/17/2020     Lab Results   Component Value Date    AMYLASE 82 04/13/2016     6/15/2021 stool studies reviewed (occasional yeast)    Reviewed prior medical records including radiology report of  11/17/2023 abdominal ultrasound; 6/23/2022 and 10/17/2020 CT scans of abdomen pelvis, 3/13/2019 HIDA scan; 3/4/2019 abdominal ultrasound, 5/10/2017 MRI abdomen; & endoscopy history (see surgical history).    Previously reviewed medical records received from Dr. Ortiz, summarized below and in medical & surgical history (endoscopies, etc), copies made & given to nurse to be scanned into system:   Visit notes: 4/1/2019, 4/15/2019, 6/8/2022, 6/30/2022, 7/13/2022, 8/1/2022, 12/15/2022, 2/16/2023  3/12/2019 discharge summary  Lab results (see records for full results): 6/9/2022 CMP with ALT 38 (H) otherwise LFTs WNL; CBC with H&H 15.2 & 48.3 (H),WBC WNL and platelet count WNL, TSH WNL, ESR WNL  Diagnoses: GERD, bowel adhesions, diverticulosis, partial bowel obstruction  EGDs: 3/20/19 & 8/17/22  Objective:      Physical Exam  Vitals and nursing note reviewed.   Constitutional:       General: She is not in acute distress.     Appearance: Normal appearance. She is well-developed. She is not diaphoretic.   HENT:      Mouth/Throat:      Lips: Pink. No lesions.      Mouth: Mucous membranes are moist. No oral lesions.      Tongue: No lesions.      Pharynx: Oropharynx is clear. No pharyngeal swelling or posterior oropharyngeal erythema.   Eyes:      General: No scleral icterus.     Conjunctiva/sclera: Conjunctivae normal.      Pupils: Pupils are equal, round, and reactive to light.   Pulmonary:      Effort: Pulmonary effort is normal. No respiratory distress.      Breath sounds: Normal breath sounds. No wheezing.   Abdominal:      General: Bowel sounds are normal. There is no distension or abdominal bruit.      Palpations: Abdomen is soft. Abdomen is not rigid. There is no mass.      Tenderness: There is abdominal tenderness (mild) in the epigastric area. There is no guarding or rebound. Negative signs include Leyva's sign and McBurney's sign.   Skin:     General: Skin is warm and dry.      Coloration: Skin is not jaundiced or  pale.      Findings: No erythema or rash.   Neurological:      Mental Status: She is alert and oriented to person, place, and time.   Psychiatric:         Behavior: Behavior normal.         Thought Content: Thought content normal.         Judgment: Judgment normal.         Assessment:       1. Heartburn    2. RUQ abdominal pain    3. Hepatic steatosis    4. Hepatomegaly    5. Elevated LFTs    6. Intermittent diarrhea          Plan:       Heartburn  -  TAKE   omeprazole (PRILOSEC) 40 MG capsule; Take 1 capsule (40 mg total) by mouth 2 (two) times daily before meals for 30 days, THEN 1 capsule (40 mg total) every morning.  Dispense: 150 capsule; Refill: 0  - TAKE CARAFATE 1 GRAM QID AS PRESCRIBED BY PCP  - schedule EGD, discussed procedure with patient, including risks and benefits, patient verbalized understanding and [facility] and reports her primary care provider Dr. Ortiz has been wanting her to do the EGD with him. Patient reports she will get scheduled for EGD with Dr. Ortiz. Informed patient to send us a copy of the results once completed.    RUQ abdominal pain  -  TAKE   omeprazole (PRILOSEC) 40 MG capsule; Take 1 capsule (40 mg total) by mouth 2 (two) times daily before meals for 30 days, THEN 1 capsule (40 mg total) every morning.  Dispense: 150 capsule; Refill: 0  -     CT Abdomen Pelvis With IV Contrast Routine Oral Contrast; Future; Expected date: 05/21/2024  -     Creatinine, serum; Future; Expected date: 05/21/2024  -     CBC Without Differential; Future; Expected date: 05/21/2024  -     Hepatic Function Panel; Future; Expected date: 05/21/2024  -     Lipase; Future; Expected date: 05/21/2024  - schedule EGD, discussed procedure with patient, including risks and benefits, patient verbalized understanding and [facility] and reports her primary care provider Dr. Ortiz has been wanting her to do the EGD with him. Patient reports she  will get scheduled for EGD with Dr. Ortiz. Informed patient to send us a copy of the results once completed.  - avoid/minimize use of NSAIDs- since they can cause GI upset, bleeding and/or ulcers. If NSAID must be taken, recommend take with food.  - Recommend follow-up with Primary Care Provider for continued evaluation and management of non-GI related etiologies (possible musculoskeletal).    Hepatic steatosis, Hepatomegaly, & Elevated LFTs  -     CT Abdomen Pelvis With IV Contrast Routine Oral Contrast; Future; Expected date: 05/21/2024  - For fatty liver recommend: low fat, low cholesterol diet, maintain good control of blood sugars and cholesterol levels, exercise, weight loss (if overweight), minimize/avoid alcohol and tylenol products, & follow-up with hepatology for continued evaluation and management. Referral placed previously.    Intermittent diarrhea & History of chronic constipation  -     CT Abdomen Pelvis With IV Contrast Routine Oral Contrast; Future; Expected date: 05/21/2024  - recommend OTC probiotic, such as Florastor or Culturelle, taken as directed on packaging  - avoid lactose, alcohol, & caffeine  - avoid known triggers  - Recommend daily exercise as tolerated, adequate water intake (six 8-oz glasses of water daily), and high fiber diet. OTC fiber supplements are recommended if diet does not reach daily fiber goal (20-30 grams daily), such as Metamucil, Citrucel, or FiberCon (take as directed, separate from other oral medications by >2 hours).  - CONTINUE OTC stool softener such as Colace as directed to avoid hard stools and straining with bowel movements PRN  - CAN CONTINUE OTC MiraLax once daily (17g PO) as directed PRN persistent constipation  -If still no improvement with these measures, call/follow-up    Follow up in about 1 month (around 6/21/2024), or if symptoms worsen or fail to improve.    If no improvement in symptoms or symptoms worsen, call/follow-up at clinic or go to  ER.        42 minutes of total time spent on the encounter, which includes face to face time and non-face to face time preparing to see the patient (e.g., review of tests), Obtaining and/or reviewing separately obtained history, Documenting clinical information in the electronic or other health record, Independently interpreting results (not separately reported) and communicating results to the patient/family/caregiver, or Care coordination (not separately reported)

## 2024-05-22 NOTE — PATIENT INSTRUCTIONS
"Severe Abdominal Pain   The Basics   Written by the doctors and editors at [facility]   Are there different types of abdominal pain? -- Yes. "Abdominal pain" means pain in the abdomen (or belly), which is the part of the body between the chest and the genital area. This pain can happen for different reasons. It can be "chronic," which means it develops over time, or "acute," which means it starts suddenly. It can be mild or severe. A person might feel the pain all over their abdomen, or only in 1 part.   Abdominal pain can feel different for different people. It can feel sharp or crampy, or dull and steady. Some people feel better if they curl into a ball, while others need to lie flat and completely still. People often feel sick to their stomach and retch or vomit.  Doctors use the term "acute abdomen" to describe an episode of severe abdominal pain that starts suddenly and lasts for a few hours or longer. It can cause pain so severe that the person has a hard time moving or breathing and it makes them want to go to the hospital or see their doctor or nurse right away. A true acute abdomen is a medical emergency.  What causes abdominal pain? -- Lots of different things can cause abdominal pain. When pain is less severe, it can be due to something like a virus or a stomach inflammation (called "gastritis").  Acute pain that is more severe can be caused by problems with 1 or more organs in the abdomen. Organs in the abdomen can be part of the digestive, urinary, or reproductive systems (figure 1 and figure 2 and figure 3).  Conditions that affect organs in the chest or genital area can also cause pain. Even though these organs aren't in the abdomen, people might still have abdominal pain.  Common causes of acute abdominal pain in adults include:  Appendicitis - Appendicitis is the term for when the appendix (a long, thin pouch that hangs down from the large intestine) gets infected and inflamed.  Diverticulitis - " Diverticulitis is an infection that develops in small pouches that can form in the intestine. This is common in older people.  Gallstones - Gallstones are small stones that form inside an organ called the gallbladder, which stores bile, a fluid that helps the body break down fat.  Abscess - An abscess is a collection of pus. An abscess can form in the abdomen, typically near the intestine.  Kidney stones - Kidney stones can form when salts and minerals that are normally in the urine build up and harden. They can cause pain when they pass through the ureters, which are the tubes that carry urine from the kidney to the bladder.  Bowel perforation - This is a hole in the bowel wall.  Perforated ulcer - This is a hole in the wall of the stomach or intestine.  Pancreatitis - This is the term for when the pancreas gets inflamed.  Ruptured cyst in the ovary - Cysts in the ovary are fluid-filled sacs that can form in some women. They sometimes rupture, which means that they break open and spill out.  Ectopic pregnancy - An ectopic pregnancy is a pregnancy that develops outside the uterus.  Should I see a doctor or nurse? -- Yes. If you have sudden or severe abdominal pain, call your doctor or nurse or go to the hospital right away. Depending on the cause of your pain, you might need immediate treatment.  Will I need tests? -- Probably. The doctor or nurse will ask about your symptoms, including where your pain is and what it feels like. The location of the pain can be an important clue to the cause.  Your doctor will ask about your current and past medical conditions, and do a physical exam. They might do repeat exams over time to follow your symptoms.  Your doctor will decide which tests you should have based on your symptoms and individual situation. The tests might include:  Blood tests  Urine tests  X-rays  An ultrasound, CT scan, or other imaging test - Imaging tests create pictures of the inside of the body.  How is  abdominal pain treated? -- Treatment depends on what's causing the pain. It might include 1 or more of the following:  Fluids given by IV  Pain medicines  Antibiotic medicines to treat an infection  Other medicines to treat other medical conditions  Surgery  All topics are updated as new evidence becomes available and our peer review process is complete.  This topic retrieved from [facility] on: Sep 21, 2021.  Topic 74525 Version 12.0  Release: 29.4.2 - C29.263  © 2021 UpToDate, Inc. and/or its affiliates. All rights reserved.  figure 1: Organs inside the abdomen (belly)     Graphic 69467 Version 6.0    figure 2: Anatomy of the urinary tract     Urine is made by the kidneys. It passes from the kidneys into the bladder through two tubes called the ureters. Then it leaves the bladder through another tube called the urethra.  Graphic 60302 Version 7.0    figure 3: Female reproductive anatomy     These are the internal organs that make up the female reproductive system.  Graphic 37069 Version 6.0    Consumer Information Use and Disclaimer   This information is not specific medical advice and does not replace information you receive from your health care provider. This is only a brief summary of general information. It does NOT include all information about conditions, illnesses, injuries, tests, procedures, treatments, therapies, discharge instructions or life-style choices that may apply to you. You must talk with your health care provider for complete information about your health and treatment options. This information should not be used to decide whether or not to accept your health care provider's advice, instructions or recommendations. Only your health care provider has the knowledge and training to provide advice that is right for you. The use of this information is governed by the [employer] End User License Agreement, available at [email or web address] use of [facility]  content is governed by the [employer] Terms of Use. ©2021 UpToDate, Inc. All rights reserved.  Copyright   © 2021 UpToDate, Inc. and/or its affiliates. All rights reserved. Uncertain Causes of Diarrhea (Adult)    Diarrhea is when stools are loose and watery. This can be caused by:  Viral infections  Bacterial infections  Food poisoning  Parasites  Irritable bowel syndrome (IBS)  Inflammatory bowel diseases such as ulcerative colitis, Crohn's disease, and celiac disease  Food intolerance, such as to lactose, the sugar found in milk and milk products  Reaction to medicines like antibiotics, laxatives, cancer drugs, and antacids  Along with diarrhea, you may also have:  Abdominal pain and cramping  Nausea and vomiting  Loss of bowel control  Fever and chills  Bloody stools  In some cases, antibiotics may help to treat diarrhea. You may have a stool sample test. This is done to see what is causing your diarrhea, and if antibiotics will help treat it. The results of a stool sample test may take up to 2 days. The healthcare provider may not give you antibiotics until he or she has the stool test results.  Diarrhea can cause dehydration. This is the loss of too much water and other fluids from the body. When this occurs, body fluid must be replaced. This can be done with oral rehydration solutions. Oral rehydration solutions are available at drugstores and grocery stores without a prescription.  Home care  Follow all instructions given by your healthcare provider. Rest at home for the next 24 hours, or until you feel better. Avoid caffeine, tobacco, and alcohol. These can make diarrhea, cramping, and pain worse.  If taking medicines:  Dont take over-the-counter diarrhea or nausea medicines unless your healthcare provider tells you to.  You may use acetaminophen or NSAID medicines like ibuprofen or naproxen to reduce pain and fever. Dont use these if you have chronic liver or kidney disease, or ever had a stomach ulcer or  gastrointestinal bleeding. Don't use NSAID medicines if you are already taking one for another condition (like arthritis) or are on daily aspirin therapy (such as for heart disease or after a stroke). Talk with your healthcare provider first.  If antibiotics were prescribed, be sure you take them until they are finished. Dont stop taking them even when you feel better. Antibiotics must be taken as a full course.  To prevent the spread of illness:  Remember that washing with soap and water and using alcohol-based  is the best way to prevent the spread of infection.  Clean the toilet after each use.  Wash your hands before eating.  Wash your hands before and after preparing food. Keep in mind that people with diarrhea or vomiting should not prepare food for others.  Wash your hands after using cutting boards, countertops, and knives that have been in contact with raw foods.  Wash and then peel fruits and vegetables.  Keep uncooked meats away from cooked and ready-to-eat foods.  Use a food thermometer when cooking. Cook poultry to at least 165°F (74°C). Cook ground meat (beef, veal, pork, lamb) to at least 160°F (71°C). Cook fresh beef, veal, lamb, and pork to at least 145°F (63°C).  Dont eat raw or undercooked eggs (poached or myrtle side up), poultry, meat, or unpasteurized milk and juices.  Food and drinks  The main goal while treating vomiting or diarrhea is to prevent dehydration. This is done by taking small amounts of liquids often.  Keep in mind that liquids are more important than food right now.  Drink only small amounts of liquids at a time.  Dont force yourself to eat, especially if you are having cramping, vomiting, or diarrhea. Dont eat large amounts at a time, even if you are hungry.  If you eat, avoid fatty, greasy, spicy, or fried foods.  Dont eat dairy foods or drink milk if you have diarrhea. These can make diarrhea worse.  During the first 24 hours you can try:  Oral rehydration  solutions. Do not use sports drinks. They have too much sugar and not enough electrolytes.  Soft drinks without caffeine  Ginger ale  Water (plain or flavored)  Decaf tea or coffee  Clear broth, consommé, or bouillon  Gelatin, popsicles, or frozen fruit juice bars  The second 24 hours, if you are feeling better, you can add:  Hot cereal, plain toast, bread, rolls, or crackers  Plain noodles, rice, mashed potatoes, chicken noodle soup, or rice soup  Unsweetened canned fruit (no pineapple)  Bananas  As you recover:  Limit fat intake to less than 15 grams per day. Dont eat margarine, butter, oils, mayonnaise, sauces, gravies, fried foods, peanut butter, meat, poultry, or fish.  Limit fiber. Dont eat raw or cooked vegetables, fresh fruits except bananas, or bran cereals.  Limit caffeine and chocolate.  Limit dairy.  Dont use spices or seasonings except salt.  Go back to your normal diet over time, as you feel better and your symptoms improve.  If the symptoms come back, go back to a simple diet or clear liquids.  Follow-up care  Follow up with your healthcare provider, or as advised. If a stool sample was taken or cultures were done, call the healthcare provider for the results as instructed.  Call 911  Call 911 if you have any of these symptoms:  Trouble breathing  Confusion  Extreme drowsiness or trouble walking  Loss of consciousness  Rapid heart rate  Chest pain  Stiff neck  Seizure  When to seek medical advice  Call your healthcare provider right away if any of these occur:  Abdominal pain that gets worse  Constant lower right abdominal pain  Continued vomiting and inability to keep liquids down  Diarrhea more than 5 times a day  Blood in vomit or stool  Dark urine or no urine for 8 hours, dry mouth and tongue, tiredness, weakness, or dizziness  Drowsiness  New rash  You dont get better in 2 to 3 days  Fever of 100.4°F (38°C) or higher that doesnt get lower with medicine  Date Last Reviewed: 1/3/2016  ©  7184-5558 [employer]. [address] [place] [address], [place] [address]. All rights reserved. This information is not intended as a substitute for professional medical care. Always follow your healthcare professional's instructions.         Acid Reflux and GERD in Adults Discharge Instructions   About this topic   GERD stands for gastroesophageal reflux disease. It is sometimes called reflux or acid reflux. Acid reflux happens when your stomach acid backs up into your esophagus, the tube that carries your food from your mouth to your stomach. This can be uncomfortable. You may have stomach or chest pain (heartburn), trouble swallowing, or an upset stomach. Some people have a cough or sore throat.  Most of the time, you can use over-the-counter medicines to help with this problem.       What care is needed at home?   Ask your doctor what you need to do when you go home. Make sure you ask questions if you do not understand what the doctor says.  Raise the head of your bed by 6 to 8 inches (15 to 20 cm). Use wood or rubber blocks under 2 legs or try a foam wedge under your mattress. Just sleeping with your head raised on pillows is not enough.  Avoid beer, wine, and mixed drinks and avoid caffeine.  Keep a healthy weight. If you are too heavy, lose weight.  If you smoke, try to quit. Your doctor or nurse can help.  Keep a diary of your signs. Write down what you had to eat before you had reflux. This will help you learn which foods cause you problems. For some people, they need to avoid coffee, chocolate, alcohol, spicy or fatty foods, or peppermint.  Avoid eating for 2 to 3 hours before bedtime. Lying down after you eat can make reflux worse.  Avoid belts and clothes that are too tight.  What follow-up care is needed?   Your doctor may ask you to make visits to the office to check on your progress. Be sure to keep these visits.  What drugs may be needed?   The doctor may order drugs to:  Relieve  heartburn  Prevent reflux  Lessen acid production  Heal the esophageal lining  Will physical activity be limited?   Your physical activities will not be limited.  What problems could happen?   Asthma  Precancerous changes in the food pipe  Long-term cough  Dental problems  Higher risk of cancer of the food pipe. This is esophageal cancer.  Narrowing of the food pipe. This is a stricture.  Open sore in the food pipe. This is an ulcer.  When do I need to call the doctor?   You have signs of a heart attack, which may include:  Severe chest pain, pressure, or discomfort with:  Breathing trouble, sweating, upset stomach, or cold, clammy skin.  Pain in your arms, back, or jaw.  Worse pain with activity like walking up stairs.  Fast or irregular heartbeat.  Feeling dizzy, faint, or weak.  You have sudden, severe belly pain or the belly pain is constant.  You have blood in the undigested food and acid that comes up, or stool that looks red, black, or like tar.  You feel like your food gets stuck or you have pain when you swallow.  You lose weight when you are not trying to.  You choke when you are eating.  Your reflux is very bad, very frequent, or not helped by over-the-counter medicines.  You keep throwing up.  Teach Back: Helping You Understand   The Teach Back Method helps you understand the information we are giving you. After you talk with the staff, tell them in your own words what you learned. This helps to make sure the staff has described each thing clearly. It also helps to explain things that may have been confusing. Before going home, make sure you can do these:  I can tell you about my condition.  I can tell you what changes I need to make with my eating habits to ease the reflux.  I can tell you what I will do if I am throwing up fluid that looks like blood or coffee grounds.  Where can I learn more?   American Academy of Family Physicians  https://[email or web address]/condition/refluxacid-[email or web address]   NHS  Choices  https://www.nhs.uk/conditions/heartburn-and-acid-reflux/   Last Reviewed Date   2021-06-09  Consumer Information Use and Disclaimer   This information is not specific medical advice and does not replace information you receive from your health care provider. This is only a brief summary of general information. It does NOT include all information about conditions, illnesses, injuries, tests, procedures, treatments, therapies, discharge instructions or life-style choices that may apply to you. You must talk with your health care provider for complete information about your health and treatment options. This information should not be used to decide whether or not to accept your health care providers advice, instructions or recommendations. Only your health care provider has the knowledge and training to provide advice that is right for you.  Copyright   Copyright © 2021 [employer] and its affiliates and/or licensors. All rights reserved.

## 2024-06-14 ENCOUNTER — HOSPITAL ENCOUNTER (OUTPATIENT)
Dept: RADIOLOGY | Facility: HOSPITAL | Age: 74
Discharge: HOME | End: 2024-06-14
Attending: NURSE PRACTITIONER
Payer: MEDICARE

## 2024-06-14 DIAGNOSIS — R19.7 INTERMITTENT DIARRHEA: ICD-10-CM

## 2024-06-14 DIAGNOSIS — R10.11 RUQ ABDOMINAL PAIN: ICD-10-CM

## 2024-06-14 DIAGNOSIS — R79.89 ELEVATED LFTS: ICD-10-CM

## 2024-06-14 PROCEDURE — A9698 NON-RAD CONTRAST MATERIALNOC: HCPCS | Mod: PO | Performed by: NURSE PRACTITIONER

## 2024-06-14 PROCEDURE — 74177 CT ABD & PELVIS W/CONTRAST: CPT | Mod: 26,,, | Performed by: RADIOLOGY

## 2024-06-14 PROCEDURE — 74177 CT ABD & PELVIS W/CONTRAST: CPT | Mod: TC,PO

## 2024-06-14 PROCEDURE — 25500020 PHARM REV CODE 255: Mod: PO | Performed by: NURSE PRACTITIONER

## 2024-06-14 RX ADMIN — IOHEXOL 1000 ML: 9 SOLUTION ORAL at 09:06

## 2024-06-14 RX ADMIN — IOHEXOL 75 ML: 350 INJECTION, SOLUTION INTRAVENOUS at 09:06

## 2024-06-20 ENCOUNTER — TELEPHONE (OUTPATIENT)
Dept: GASTROENTEROLOGY | Facility: CLINIC | Age: 74
End: 2024-06-20
Payer: MEDICARE

## 2024-06-20 NOTE — TELEPHONE ENCOUNTER
----- Message from Shelbie Mckinley sent at 6/20/2024  3:28 PM CDT -----  Contact: Self  Type:  Test Results    Who Called:  Pt  Name of Test (Lab/Mammo/Etc):  LAB & CT scan   Date of Test:  6/14  Ordering Provider: SHANNAN Sanchez  Where the test was performed:  [facility]  Would the patient rather a call back or a response via [facility]?  call  Best Call Back Number:  [phone]  Please call to advise.. Thank you...

## 2024-08-13 ENCOUNTER — HOSPITAL ENCOUNTER (OUTPATIENT)
Dept: RADIOLOGY | Facility: HOSPITAL | Age: 74
Discharge: HOME | End: 2024-08-13
Attending: SURGERY
Payer: MEDICARE

## 2024-08-13 DIAGNOSIS — M81.0 SENILE OSTEOPOROSIS: ICD-10-CM

## 2024-08-13 DIAGNOSIS — Z12.31 ENCOUNTER FOR SCREENING MAMMOGRAM FOR MALIGNANT NEOPLASM OF BREAST: ICD-10-CM

## 2024-08-13 PROCEDURE — 77091 TBS TECHL CALCULATION ONLY: CPT | Mod: PO

## 2024-08-13 PROCEDURE — 77063 BREAST TOMOSYNTHESIS BI: CPT | Mod: 26,,, | Performed by: RADIOLOGY

## 2024-08-13 PROCEDURE — 77067 SCR MAMMO BI INCL CAD: CPT | Mod: 26,,, | Performed by: RADIOLOGY

## 2024-08-13 PROCEDURE — 77067 SCR MAMMO BI INCL CAD: CPT | Mod: TC,PO

## 2024-08-13 PROCEDURE — 77080 DXA BONE DENSITY AXIAL: CPT | Mod: TC,PO

## 2024-09-09 ENCOUNTER — LAB VISIT (OUTPATIENT)
Dept: LAB | Facility: HOSPITAL | Age: 74
End: 2024-09-09
Payer: MEDICARE

## 2024-09-09 DIAGNOSIS — R00.2 PALPITATIONS: ICD-10-CM

## 2024-09-09 DIAGNOSIS — I25.10 CORONARY ATHEROSCLEROSIS OF NATIVE CORONARY ARTERY: ICD-10-CM

## 2024-09-09 DIAGNOSIS — I10 ESSENTIAL HYPERTENSION, MALIGNANT: ICD-10-CM

## 2024-09-09 DIAGNOSIS — I25.84 CORONARY ATHEROSCLEROSIS DUE TO SEVERELY CALCIFIED CORONARY LESION: ICD-10-CM

## 2024-09-09 DIAGNOSIS — E11.9 DIABETES MELLITUS WITHOUT COMPLICATION: ICD-10-CM

## 2024-09-09 LAB
ALBUMIN SERPL BCP-MCNC: 4.1 G/DL (ref 3.5–5.2)
ALP SERPL-CCNC: 136 U/L (ref 55–135)
ALT SERPL W/O P-5'-P-CCNC: 32 U/L (ref 10–44)
ANION GAP SERPL CALC-SCNC: 5 MMOL/L (ref 8–16)
AST SERPL-CCNC: 25 U/L (ref 10–40)
BILIRUB SERPL-MCNC: 0.5 MG/DL (ref 0.1–1)
BUN SERPL-MCNC: 15 MG/DL (ref 8–23)
CALCIUM SERPL-MCNC: 10 MG/DL (ref 8.7–10.5)
CHLORIDE SERPL-SCNC: 101 MMOL/L (ref 95–110)
CO2 SERPL-SCNC: 30 MMOL/L (ref 23–29)
CREAT SERPL-MCNC: 0.8 MG/DL (ref 0.5–1.4)
EST. GFR  (NO RACE VARIABLE): >60 ML/MIN/1.73 M^2
ESTIMATED AVG GLUCOSE: 194 MG/DL (ref 68–131)
GLUCOSE SERPL-MCNC: 154 MG/DL (ref 70–110)
HBA1C MFR BLD: 8.4 % (ref 4–5.6)
POTASSIUM SERPL-SCNC: 4.8 MMOL/L (ref 3.5–5.1)
PROT SERPL-MCNC: 7.3 G/DL (ref 6–8.4)
SODIUM SERPL-SCNC: 136 MMOL/L (ref 136–145)

## 2024-09-09 PROCEDURE — 80053 COMPREHEN METABOLIC PANEL: CPT | Performed by: PHYSICIAN ASSISTANT

## 2024-09-09 PROCEDURE — 36415 COLL VENOUS BLD VENIPUNCTURE: CPT | Mod: PO | Performed by: PHYSICIAN ASSISTANT

## 2024-09-09 PROCEDURE — 83036 HEMOGLOBIN GLYCOSYLATED A1C: CPT | Performed by: PHYSICIAN ASSISTANT

## 2024-09-18 RX ORDER — ESTRADIOL 0.1 MG/G
CREAM VAGINAL
Qty: 42.5 G | Refills: 3 | Status: SHIPPED | OUTPATIENT
Start: 2024-09-18

## 2024-10-07 ENCOUNTER — OFFICE VISIT (OUTPATIENT)
Dept: ENDOCRINOLOGY | Facility: CLINIC | Age: 74
End: 2024-10-07
Payer: MEDICARE

## 2024-10-07 VITALS
BODY MASS INDEX: 33.15 KG/M2 | HEIGHT: 59 IN | WEIGHT: 164.44 LBS | HEART RATE: 74 BPM | SYSTOLIC BLOOD PRESSURE: 110 MMHG | OXYGEN SATURATION: 95 % | DIASTOLIC BLOOD PRESSURE: 68 MMHG

## 2024-10-07 DIAGNOSIS — E11.40 TYPE 2 DIABETES MELLITUS WITH DIABETIC NEUROPATHY, WITHOUT LONG-TERM CURRENT USE OF INSULIN: ICD-10-CM

## 2024-10-07 DIAGNOSIS — E78.00 HYPERCHOLESTEROLEMIA: ICD-10-CM

## 2024-10-07 DIAGNOSIS — E66.09 CLASS 1 OBESITY DUE TO EXCESS CALORIES WITH BODY MASS INDEX (BMI) OF 33.0 TO 33.9 IN ADULT, UNSPECIFIED WHETHER SERIOUS COMORBIDITY PRESENT: ICD-10-CM

## 2024-10-07 DIAGNOSIS — I25.10 CORONARY ARTERY DISEASE DUE TO CALCIFIED CORONARY LESION: ICD-10-CM

## 2024-10-07 DIAGNOSIS — I10 ESSENTIAL HYPERTENSION: ICD-10-CM

## 2024-10-07 DIAGNOSIS — E66.811 CLASS 1 OBESITY DUE TO EXCESS CALORIES WITH BODY MASS INDEX (BMI) OF 33.0 TO 33.9 IN ADULT, UNSPECIFIED WHETHER SERIOUS COMORBIDITY PRESENT: ICD-10-CM

## 2024-10-07 DIAGNOSIS — E11.65 TYPE 2 DIABETES MELLITUS WITH HYPERGLYCEMIA, WITHOUT LONG-TERM CURRENT USE OF INSULIN: Primary | ICD-10-CM

## 2024-10-07 DIAGNOSIS — I25.84 CORONARY ARTERY DISEASE DUE TO CALCIFIED CORONARY LESION: ICD-10-CM

## 2024-10-07 PROBLEM — E66.813 CLASS 3 OBESITY: Status: ACTIVE | Noted: 2024-10-07

## 2024-10-07 PROCEDURE — 4010F ACE/ARB THERAPY RXD/TAKEN: CPT | Mod: CPTII,S$GLB,, | Performed by: NURSE PRACTITIONER

## 2024-10-07 PROCEDURE — 3288F FALL RISK ASSESSMENT DOCD: CPT | Mod: CPTII,S$GLB,, | Performed by: NURSE PRACTITIONER

## 2024-10-07 PROCEDURE — 1125F AMNT PAIN NOTED PAIN PRSNT: CPT | Mod: CPTII,S$GLB,, | Performed by: NURSE PRACTITIONER

## 2024-10-07 PROCEDURE — 99999 PR PBB SHADOW E&M-EST. PATIENT-LVL IV: CPT | Mod: PBBFAC,,, | Performed by: NURSE PRACTITIONER

## 2024-10-07 PROCEDURE — 1101F PT FALLS ASSESS-DOCD LE1/YR: CPT | Mod: CPTII,S$GLB,, | Performed by: NURSE PRACTITIONER

## 2024-10-07 PROCEDURE — G2211 COMPLEX E/M VISIT ADD ON: HCPCS | Mod: S$GLB,,, | Performed by: NURSE PRACTITIONER

## 2024-10-07 PROCEDURE — 3052F HG A1C>EQUAL 8.0%<EQUAL 9.0%: CPT | Mod: CPTII,S$GLB,, | Performed by: NURSE PRACTITIONER

## 2024-10-07 PROCEDURE — 1159F MED LIST DOCD IN RCRD: CPT | Mod: CPTII,S$GLB,, | Performed by: NURSE PRACTITIONER

## 2024-10-07 PROCEDURE — 99204 OFFICE O/P NEW MOD 45 MIN: CPT | Mod: S$GLB,,, | Performed by: NURSE PRACTITIONER

## 2024-10-07 PROCEDURE — 3078F DIAST BP <80 MM HG: CPT | Mod: CPTII,S$GLB,, | Performed by: NURSE PRACTITIONER

## 2024-10-07 PROCEDURE — 3074F SYST BP LT 130 MM HG: CPT | Mod: CPTII,S$GLB,, | Performed by: NURSE PRACTITIONER

## 2024-10-07 RX ORDER — AMLODIPINE BESYLATE 5 MG/1
5 TABLET ORAL
COMMUNITY

## 2024-10-07 RX ORDER — BLOOD-GLUCOSE SENSOR
EACH MISCELLANEOUS
Qty: 2 EACH | Refills: 6 | Status: SHIPPED | OUTPATIENT
Start: 2024-10-07

## 2024-10-07 NOTE — PROGRESS NOTES
CC: This 74 y.o. female presents for management of diabetes  and chronic conditions pending review including HTN, HLP, CAD, obesity    HPI: She was diagnosed with T2DM in ~ 2019.  Has never been hospitalized r/t DM.  Family hx of DM: PGM     Arrives new to endocrine  Jardiance was stopped ~ 1 week ago  She was feeling breathless, her head was foggy  Does have a hx of vertigo, so she's still feeling mostly the same way but not as bad    hypoglycemia at home- none  monitoring BG at home:   Bg was 119 this am  Her last check prior  to this her was 2 weeks ago fasting bg was 135    Diet: Eats 3 Meals a day, snacks- muffins   Exercise: treadmill walking 20 mins, 5 days a week   CURRENT DM MEDS: none  Previous meds- jardiance   Ozempic- GI issues  Glucometer type: True Metrix     Standards of Care:  Eye exam: Dr Gordon~ 2024     ROS:   Gen: Appetite good  Eyes: Denies visual disturbances  Resp: no SOB or GREENE   Cardiac: + palpitations, chest pain - following w Dr Castillo, also may be anxiety related   GI: No nausea or vomiting,+ diarrhea, no constipation   /GYN: 1+ nocturia, no burning or pain.   MS/Neuro: Denies numbness/ tingling in BLE; Gait steady, speech clear  Psych: Denies drug/ETOH abuse, + anxiety.  Other systems: negative.    PE:  GENERAL: Well developed, well nourished.  PSYCH: AAOx3, appropriate mood and affect, pleasant expression, conversant, appears relaxed, well groomed.   EYES: Conjunctiva, corneas clear  NECK: Supple, trachea midline,no thyromegaly or nodules  CHEST: Resp even and unlabored, CTA bilateral.  CARDIAC: RRR, S1, S2 heard, no murmurs  ABDOMEN: Soft, non-tender, non-distended   VASCULAR: DP pulses +2/4 bilaterally, no edema.  NEURO: Gait steady  SKIN: Skin warm and dry no acanthosis nigracans.  FOOT EXAMINATION: 10/7/2024  No foot deformity, corns or callus formation, Onychomycosis, nails in good condition and well trimmed, no interspace maceration or ulceration noted.  Decreased hair growth  "present over toes/feet.  Protective sensation intact with 10 gram monofilament.  +2 dorsalis pedis and posterior pulses noted.     Personally reviewed Past Medical, Surgical, Social History.    /68 (BP Location: Right arm, Patient Position: Sitting)   Pulse 74   Ht 4' 11" (1.499 m)   Wt 74.6 kg (164 lb 7.4 oz)   LMP 10/07/1976   SpO2 95%   BMI 33.22 kg/m²      Personally reviewed the below labs:      Chemistry        Component Value Date/Time     09/09/2024 1421    K 4.8 09/09/2024 1421     09/09/2024 1421    CO2 30 (H) 09/09/2024 1421    BUN 15 09/09/2024 1421    CREATININE 0.8 09/09/2024 1421     (H) 09/09/2024 1421        Component Value Date/Time    CALCIUM 10.0 09/09/2024 1421    ALKPHOS 136 (H) 09/09/2024 1421    AST 25 09/09/2024 1421    ALT 32 09/09/2024 1421    BILITOT 0.5 09/09/2024 1421    ESTGFRAFRICA >60 06/23/2022 0858    EGFRNONAA >60 06/23/2022 0858            Lab Results   Component Value Date    TSH 6.243 (H) 11/01/2017              Results for orders placed or performed in visit on 09/26/13   Vitamin D    Collection Time: 09/26/13 11:20 AM   Result Value Ref Range    Vit D, 25-Hydroxy 24 (L) 30 - 96 ng/mL     No results found for this or any previous visit.    No results found for: "MICALBCREAT"    Hemoglobin A1C   Date Value Ref Range Status   09/09/2024 8.4 (H) 4.0 - 5.6 % Final     Comment:     ADA Screening Guidelines:  5.7-6.4%  Consistent with prediabetes  >or=6.5%  Consistent with diabetes    High levels of fetal hemoglobin interfere with the HbA1C  assay. Heterozygous hemoglobin variants (HbS, HgC, etc)do  not significantly interfere with this assay.   However, presence of multiple variants may affect accuracy.     08/22/2024 9.0 % Final   09/26/2013 5.9 4.5 - 6.2 % Final   02/01/2012 6.0 4.0 - 6.2 % Final        ASSESSMENT and PLAN:      1. T2DM with hyperglycemia,   She will cash pay for El- Rx sent  DM edu diet  She wants to try natural " treatments  Recommend cinnamon and berberine     2. HTN - controlled, continue meds as previously prescribed and monitor.     3. HLP - LDL , on statin therapy, LFTs WNL    4. CAD- following w Dr Castillo    5. Obesity - Body mass index is 33.22 kg/m². Encouraged continued exercise, possible w 10-15% reduction in weight may help decrease insulin resistance        Follow-up: in 3 months with A1C

## 2024-10-10 ENCOUNTER — CLINICAL SUPPORT (OUTPATIENT)
Dept: DIABETES | Facility: CLINIC | Age: 74
End: 2024-10-10
Payer: MEDICARE

## 2024-10-10 VITALS — WEIGHT: 164.44 LBS | HEIGHT: 59 IN | BODY MASS INDEX: 33.15 KG/M2

## 2024-10-10 DIAGNOSIS — E11.40 TYPE 2 DIABETES MELLITUS WITH DIABETIC NEUROPATHY, WITHOUT LONG-TERM CURRENT USE OF INSULIN: ICD-10-CM

## 2024-10-10 PROCEDURE — G0108 DIAB MANAGE TRN  PER INDIV: HCPCS | Mod: S$GLB,,, | Performed by: DIETITIAN, REGISTERED

## 2024-10-10 PROCEDURE — 99999 PR PBB SHADOW E&M-EST. PATIENT-LVL I: CPT | Mod: PBBFAC,,, | Performed by: DIETITIAN, REGISTERED

## 2024-10-10 NOTE — PROGRESS NOTES
"Diabetes Care Specialist Progress Note  Author: Indu Bush RD, CDE  Date: 10/10/2024    Intake    Program Intake  Reason for Diabetes Program Visit:: Initial Diabetes Assessment  Current diabetes risk level:: moderate  In the last 12 months, have you:: none    Current Diabetes Treatment: Diet/Exercise (previously tried Jardiance and Ozempic but unable to tolerate due to side effects)  Diet/Exercise Type/Dose: trying to decrease sweets, walks or has elliptical at home for exercise    Continuous Glucose Monitoring  Patient has CGM: No    Lab Results   Component Value Date    HGBA1C 8.4 (H) 09/09/2024     Weight: 74.6 kg (164 lb 7.4 oz)   Height: 4' 11" (149.9 cm)   Body mass index is 33.22 kg/m².    Lifestyle Coping Support & Clinical    Lifestyle/Coping/Support  Compared to other people your age, how would you rate your health?: Good  Does anyone in your family have diabetes or does anyone in your family support you in your diabetes care?: Paternal grandmother had diabetes.   and son support patient in her care.  List anything about Diabetes that causes you stress?: Trying to choose healthy  meals  Learning Barriers:: None  Culture or Christian beliefs that may impact ability to access healthcare: No  Psychosocial/Coping Skills Assessment Completed: : Yes  Assessment indicates:: Adequate understanding  Area of need?: No    Problem Review  Active Comorbidities: Cardiovascular Disease, Hypertension, Hyperlipidemia/Dyslipidemia    Diabetes Self-Management Skills Assessment    Medication Skills Assessment  Medication Skills Assessment Completed:: Yes  Assessment indicates:: Adequate understanding  Area of need?: No    Diabetes Disease Process/Treatment Options  Diabetes Type?: Type II  When were you diagnosed?: Diagnosed in 2019  What are your goals for this education session?: Learn how to eat healthier and improve glucose levels without DM medications  Is patient aware of what causes diabetes?: Yes  Does " patient understand the pathophysiology of diabetes?: Yes  Diabetes Disease Process/Treatment Options: Skills Assessment Completed: Yes  Assessment indicates:: Adequate understanding  Area of need?: No    Nutrition/Healthy Eating  Meal Plan 24 Hour Recall - Breakfast: [employer] cinnamon harvest cereal with coconut milk + blueberries added, cup of coffee with [employer] creamer  Meal Plan 24 Hour Recall - Lunch: [employer] chipotle avocado melt (1/2 sandwich) with cup broccoli cheese soup, oatmeal raisin cookie, water  Meal Plan 24 Hour Recall - Dinner: [employer] 1/2 turkey sandwich with provolone cheese, jarrett, lettuce, tomato, water  Meal Plan 24 Hour Recall - Snack: apple with peanut butter, has sweet tooth  Meal Plan 24 Hour Recall - Beverage: water, diet coke, carbonated water, regular soda/sweet tea on occasion when dining out  Who shops/cooks?: Dines out daily for lunch and may dine out for dinner also  Patient can identify foods that impact blood sugar.: yes  Challenges to healthy eating:: eating out, going to parties, portion control, snacking between meals and at night  Nutrition/Healthy Eating Skills Assessment Completed:: Yes  Assessment indicates:: Instruction Needed, Knowledge deficit  Area of need?: Yes    Physical Activity/Exercise  Patient's daily activity level:: lightly active (some physical limitations due to pain in arms and hip)  Patient formally exercises outside of work.: yes  Frequency: three times a week  Patient can identify forms of physical activity.: yes  Physical Activity/Exercise Skills Assessment Completed: : Yes  Assessment indicates:: Instruction Needed  Area of need?: Yes    Home Blood Glucose Monitoring  Patient states that blood sugar is checked at home daily.: yes  Monitoring Method:: home glucometer  Home glucometer meter type:: True Metrix--uncertain if glucose test strips are , will check tonight  Fasting BG range history:: Reports recent glucose readings 115-135  mg/dL  How often do you check your blood sugar?: Checks 1-3 times a month, always fasting in morning  What is your A1c [employer]?: < 7%  Home Blood Glucose Monitoring Skills Assessment Completed: : Yes  Assessment indicates:: Instruction Needed  Area of need?: Yes    Chronic Complications  Reviewed health maintenance: yes  Have you completed your annual diabetes maintenance labwork? : yes  Has your doctor examined your feet?: yes  Do you see an eye doctor?: yes  Eye doctor date of last visit:: earlier in 2024--Dr. Nguyễn  Chronic Complications Skills Assessment Completed: : Yes  Assessment indicates:: Adequate understanding  Area of need?: No    Assessment Summary and Plan    Based on today's diabetes care assessment, the following areas of need were identified:          10/10/2024   Areas of Need   Medications/Current Diabetes Treatment No   Lifestyle Coping Support No   Diabetes Disease Process/Treatment Options No   Nutrition/Healthy Eating Yes--see care plan   Physical Activity/Exercise Yes--see care plan   Home Blood Glucose Monitoring Yes--see care plan   Chronic Complications No      Today's interventions were provided through individual discussion, instruction, and written materials were provided.      Patient verbalized understanding of instruction and written materials.  Pt was able to return back demonstration of instructions today. Patient understood key points, needs reinforcement and further instruction.     Diabetes Self-Management Care Plan:    Today's Diabetes Self-Management Care Plan was developed with Herminia's input. Herminia has agreed to work toward the following goal(s) to improve his/her overall diabetes control.      Care Plan: Diabetes Management   Updates made since 9/10/2024 12:00 AM        Problem: Healthy Eating         Goal: Eliminate sweetened beverages.  Limit carbs at meals to 30-45 grams and include protein source at all meals. Increase non-starchy vegetables for satiety. Limit carbs  when snacking to 15 grams.    Start Date: 10/10/2024   Expected End Date: 4/10/2025   Priority: High   Barriers: Knowledge deficit        Task: Reviewed the sources and role of Carbohydrate, Protein, and Fat and how each nutrient impacts blood sugar. Completed 10/10/2024        Task: Provided visual examples using dry measuring cups, food models, and other familiar objects such as computer mouse, deck or cards, tennis ball etc. to help with visualization of portions. Completed 10/10/2024        Task: Discussed strategies for choosing healthier menu options when dining out. Completed 10/10/2024        Task: Recommended replacing beverages containing high sugar content with noncaloric/sugar free options and/or water. Completed 10/10/2024        Task: Provided Sample plate method and reviewed the use of the plate to estimate amounts of carbohydrate per meal. Completed 10/10/2024        Problem: Physical Activity and Exercise         Goal: Patient agrees to increase physical activity to a goal of 5-6 times per week for at least 30 minutes--patient with some limitations due to pain.    Start Date: 10/10/2024   Expected End Date: 4/10/2025   Priority: Medium   Barriers: No Barriers Identified        Task: Discussed role of physical activity on reducing insulin resistance and improvement in overall glycemic control. Completed 10/10/2024        Task: Discussed role of physical activity as it relates to weight loss Completed 10/10/2024        Task: Offered suggestions on how patient could increase their regular physical activity Completed 10/10/2024        Problem: Blood Glucose Self-Monitoring         Goal: Will consider increasing frequency of fingerstick monitoring of glucose or will cash pay for personal CGM to better understand glucose levels when eating.    Start Date: 10/10/2024   Expected End Date: 4/10/2025   Priority: Low   Barriers: No Barriers Identified   Note:    Patient has Rx for Freestyle El 3 and may  cash pay and use 14 days on and 14 days off as she feels knowing her glucose more often may promote healthier behaviors when choosing meals/foods.        Task: Reviewed the importance of self-monitoring blood glucose and keeping logs. Completed 10/10/2024        Task: Instructed on how to self-monitor blood glucose using a home glucometer, how to properly dispose of used strips and lancets after use, and how to appropriately store meter and supplies. Completed 10/10/2024        Task: Discussed ways to minimize pain when monitoring blood glucose. Completed 10/10/2024        Follow Up Plan     Follow up in about 6 months (around 4/10/2025) for continued DSMES or sooner if glycemic control does not improve. Patient will work on increasing aerobic exercise as tolerated to goal of 30-45 minutes most days of the week. Motivated to improve diet--will work to reduce intake of carbs at meals, currently having all carb meals/snacks.  Written education provided to help with meal planning at home.  Continue home glucose monitoring to determine if glucose levels improving with diet and exercise.      Today's care plan and follow up schedule was discussed with patient.  Herminia verbalized understanding of the care plan, goals, and agrees to follow up plan.        The patient was encouraged to communicate with his/her health care provider/physician and care team regarding his/her condition(s) and treatment.  I provided the patient with my contact information today and encouraged to contact me via phone or [facility] Patient Portal as needed.     Length of Visit   Total Time: 60 Minutes

## 2024-10-23 ENCOUNTER — TELEPHONE (OUTPATIENT)
Dept: ENDOCRINOLOGY | Facility: CLINIC | Age: 74
End: 2024-10-23
Payer: MEDICARE

## 2024-10-23 NOTE — TELEPHONE ENCOUNTER
Pt not eligible for Dexcom through medical has having no documented hypoglycemic events. Advised pt & verbalized understanding.

## 2024-11-13 ENCOUNTER — LAB VISIT (OUTPATIENT)
Dept: LAB | Facility: HOSPITAL | Age: 74
End: 2024-11-13
Attending: SURGERY
Payer: MEDICARE

## 2024-11-13 ENCOUNTER — OFFICE VISIT (OUTPATIENT)
Dept: GASTROENTEROLOGY | Facility: CLINIC | Age: 74
End: 2024-11-13
Payer: MEDICARE

## 2024-11-13 VITALS — BODY MASS INDEX: 33.64 KG/M2 | WEIGHT: 166.88 LBS | HEIGHT: 59 IN

## 2024-11-13 DIAGNOSIS — M25.50 ARTHRALGIA, UNSPECIFIED JOINT: ICD-10-CM

## 2024-11-13 DIAGNOSIS — I25.84 CORONARY ATHEROSCLEROSIS DUE TO SEVERELY CALCIFIED CORONARY LESION: ICD-10-CM

## 2024-11-13 DIAGNOSIS — K62.89 RECTAL PAIN: ICD-10-CM

## 2024-11-13 DIAGNOSIS — Z87.19 HISTORY OF DIVERTICULOSIS: ICD-10-CM

## 2024-11-13 DIAGNOSIS — R10.10 UPPER ABDOMINAL PAIN: Primary | ICD-10-CM

## 2024-11-13 DIAGNOSIS — R79.89 ELEVATED LFTS: ICD-10-CM

## 2024-11-13 DIAGNOSIS — I10 ESSENTIAL HYPERTENSION, MALIGNANT: ICD-10-CM

## 2024-11-13 DIAGNOSIS — R00.2 PALPITATIONS: ICD-10-CM

## 2024-11-13 DIAGNOSIS — I25.10 CORONARY ATHEROSCLEROSIS OF NATIVE CORONARY ARTERY: Primary | ICD-10-CM

## 2024-11-13 DIAGNOSIS — E11.9 DIABETES MELLITUS WITHOUT COMPLICATION: ICD-10-CM

## 2024-11-13 DIAGNOSIS — Z90.49 S/P CHOLECYSTECTOMY: ICD-10-CM

## 2024-11-13 DIAGNOSIS — K58.2 IRRITABLE BOWEL SYNDROME WITH BOTH CONSTIPATION AND DIARRHEA: ICD-10-CM

## 2024-11-13 DIAGNOSIS — Z79.1 NSAID LONG-TERM USE: ICD-10-CM

## 2024-11-13 DIAGNOSIS — N81.6 RECTOCELE: ICD-10-CM

## 2024-11-13 DIAGNOSIS — Z79.01 ANTICOAGULANT LONG-TERM USE: ICD-10-CM

## 2024-11-13 DIAGNOSIS — R12 HEARTBURN: ICD-10-CM

## 2024-11-13 DIAGNOSIS — R16.0 HEPATOMEGALY: ICD-10-CM

## 2024-11-13 DIAGNOSIS — K76.0 HEPATIC STEATOSIS: ICD-10-CM

## 2024-11-13 DIAGNOSIS — N39.0 UTI (URINARY TRACT INFECTION): ICD-10-CM

## 2024-11-13 LAB
ANION GAP SERPL CALC-SCNC: 7 MMOL/L (ref 8–16)
BACTERIA #/AREA URNS HPF: ABNORMAL /HPF
BILIRUB UR QL STRIP: NEGATIVE
BUN SERPL-MCNC: 13 MG/DL (ref 8–23)
CALCIUM SERPL-MCNC: 10.2 MG/DL (ref 8.7–10.5)
CHLORIDE SERPL-SCNC: 101 MMOL/L (ref 95–110)
CLARITY UR: CLEAR
CO2 SERPL-SCNC: 29 MMOL/L (ref 23–29)
COLOR UR: YELLOW
CREAT SERPL-MCNC: 0.8 MG/DL (ref 0.5–1.4)
EST. GFR  (NO RACE VARIABLE): >60 ML/MIN/1.73 M^2
GLUCOSE SERPL-MCNC: 161 MG/DL (ref 70–110)
GLUCOSE UR QL STRIP: ABNORMAL
HGB UR QL STRIP: NEGATIVE
KETONES UR QL STRIP: NEGATIVE
LEUKOCYTE ESTERASE UR QL STRIP: NEGATIVE
MICROSCOPIC COMMENT: ABNORMAL
NITRITE UR QL STRIP: NEGATIVE
PH UR STRIP: 6 [PH] (ref 5–8)
POTASSIUM SERPL-SCNC: 4.8 MMOL/L (ref 3.5–5.1)
PROT UR QL STRIP: NEGATIVE
SODIUM SERPL-SCNC: 137 MMOL/L (ref 136–145)
SP GR UR STRIP: <=1.005 (ref 1–1.03)
SQUAMOUS #/AREA URNS HPF: 1 /HPF
URN SPEC COLLECT METH UR: ABNORMAL
WBC #/AREA URNS HPF: 1 /HPF (ref 0–5)
YEAST URNS QL MICRO: ABNORMAL

## 2024-11-13 PROCEDURE — 3052F HG A1C>EQUAL 8.0%<EQUAL 9.0%: CPT | Mod: CPTII,S$GLB,, | Performed by: NURSE PRACTITIONER

## 2024-11-13 PROCEDURE — 81000 URINALYSIS NONAUTO W/SCOPE: CPT | Mod: PO | Performed by: SURGERY

## 2024-11-13 PROCEDURE — 3288F FALL RISK ASSESSMENT DOCD: CPT | Mod: CPTII,S$GLB,, | Performed by: NURSE PRACTITIONER

## 2024-11-13 PROCEDURE — 36415 COLL VENOUS BLD VENIPUNCTURE: CPT | Mod: PO | Performed by: PHYSICIAN ASSISTANT

## 2024-11-13 PROCEDURE — 87088 URINE BACTERIA CULTURE: CPT | Performed by: SURGERY

## 2024-11-13 PROCEDURE — 87086 URINE CULTURE/COLONY COUNT: CPT | Performed by: SURGERY

## 2024-11-13 PROCEDURE — 99999 PR PBB SHADOW E&M-EST. PATIENT-LVL IV: CPT | Mod: PBBFAC,,, | Performed by: NURSE PRACTITIONER

## 2024-11-13 PROCEDURE — 1101F PT FALLS ASSESS-DOCD LE1/YR: CPT | Mod: CPTII,S$GLB,, | Performed by: NURSE PRACTITIONER

## 2024-11-13 PROCEDURE — 1126F AMNT PAIN NOTED NONE PRSNT: CPT | Mod: CPTII,S$GLB,, | Performed by: NURSE PRACTITIONER

## 2024-11-13 PROCEDURE — 99214 OFFICE O/P EST MOD 30 MIN: CPT | Mod: S$GLB,,, | Performed by: NURSE PRACTITIONER

## 2024-11-13 PROCEDURE — 4010F ACE/ARB THERAPY RXD/TAKEN: CPT | Mod: CPTII,S$GLB,, | Performed by: NURSE PRACTITIONER

## 2024-11-13 PROCEDURE — 80048 BASIC METABOLIC PNL TOTAL CA: CPT | Performed by: PHYSICIAN ASSISTANT

## 2024-11-13 RX ORDER — OMEPRAZOLE 40 MG/1
40 CAPSULE, DELAYED RELEASE ORAL
Qty: 30 CAPSULE | Refills: 1 | Status: SHIPPED | OUTPATIENT
Start: 2024-11-13

## 2024-11-13 NOTE — PROGRESS NOTES
Subjective:       Patient ID: Herminia Wooten is a 74 y.o. female Body mass index is 33.71 kg/m².    Chief Complaint: Abdominal Pain (Right upper quad area history of gallbladder removal)  Established patient of MARISA Mccloud NP, & myself.    Patient has not seen hepatology as previously referred. Patient did not have EGD as previously recommended. Patient has been seeing Dr. Ortiz. Patient reports she has been having constant bodyaches since 7/2024. Reports had UTI recently. Reports she went to the ER ([place] [facility] in 9/2024) and urgent care. Reports scheduled for MRI of neck, shoulder and spine on 11/22/2024. Reports been seeing PCP. Scheduled for see rheumatology soon on 11/26/2024. Seeing back doctor on 11/27/2024. Has seen ortho for it and had 2 shoulder injections and helped for ~3 weeks.    GI Problem  The primary symptoms include abdominal pain, diarrhea (intermittent loose stools; triggered by certain foods (mexican food), often after eating lunch) and arthralgias (CHIEF COMPLAINT: ibuprofen or tylenol PRN, icy hot, muscle relaxer PRN, norco PRN). Primary symptoms do not include fever, weight loss (stable since our last visit), fatigue, nausea, vomiting, melena, hematemesis, jaundice, hematochezia or dysuria.   The abdominal pain began more than 2 days ago (~ early 5/2024 after overeating at [employer], saw her PCP and he started her on carafate (no longer taking)). The abdominal pain has been unchanged since its onset. The abdominal pain is located in the RUQ, epigastric region and LUQ (described as sharp; triggered after taking medication (ibuprofen or tylenol)). The abdominal pain radiates to the back. The severity of the abdominal pain is 0/10 (currently). Relieved by: worse with increased activity, mexican food.   The illness is also significant for bloating (occasional) and constipation (chronic for years; bowel movements are once a day of formed stool currently; TREATMENT: colace daily,  miralax PRN, high fiber diet, PAST TREATMENT: suppository- unsuccessful rectocele repair, enemas help, dulcolax, linzess- doesn't remember taking it). The illness does not include chills, dysphagia or odynophagia. Significant associated medical issues include GERD (frequent; prilosec 20 mg once daily PRN helps, Tums PRN- daily, mylanta prn- taking a few times a week; PAST TREATMENTS: protonix, zantac, carafate), bowel resection (colectomy due to endometriosis & rectocele repair), irritable bowel syndrome (PAST TREATMENT: bentyl), hemorrhoids and diverticulitis. Associated medical issues do not include inflammatory bowel disease.     Review of Systems   Constitutional:  Negative for appetite change, chills, fatigue, fever and weight loss (stable since our last visit).        Reports frequent NSAID use of ibuprofen   HENT:  Negative for sore throat and trouble swallowing.    Respiratory:  Negative for cough, choking and shortness of breath.    Cardiovascular:  Negative for chest pain.   Gastrointestinal:  Positive for abdominal pain, bloating (occasional), constipation (chronic for years; bowel movements are once a day of formed stool currently; TREATMENT: colace daily, miralax PRN, high fiber diet, PAST TREATMENT: suppository- unsuccessful rectocele repair, enemas help, dulcolax, linzess- doesn't remember taking it), diarrhea (intermittent loose stools; triggered by certain foods (mexican food), often after eating lunch) and rectal pain (occasional after digital manipulation with rectocele). Negative for anal bleeding, blood in stool, dysphagia, hematemesis, hematochezia, jaundice, melena, nausea and vomiting.   Genitourinary:  Negative for difficulty urinating, dysuria and flank pain.        History of rectocele.   Musculoskeletal:  Positive for arthralgias (CHIEF COMPLAINT: ibuprofen or tylenol PRN, icy hot, muscle relaxer PRN, norco PRN).   Neurological:  Negative for weakness.       Patient's last menstrual  period was 10/07/1976. s/p hysterectomy    Past Medical History:   Diagnosis Date    Anesthesia complication     elevated BP    Colon polyp     Coronary artery disease     Diabetes mellitus     Diverticulitis     Diverticulosis     Encounter for blood transfusion     Fatty liver 04/25/2016    Gallbladder polyp     GERD (gastroesophageal reflux disease)     Hyperlipidemia     Hypertension     Hypothyroid     Irritable bowel syndrome     Kidney stone     Liver hemangioma     Partial bowel obstruction     Seasonal allergies     Thyroid disease      Past Surgical History:   Procedure Laterality Date    ABDOMINAL SURGERY      APPENDECTOMY      CARDIAC CATHETERIZATION      2 stents    CHOLECYSTECTOMY  04/04/2019    Dr. Ortiz: report and biopsy sent for scanning    COLECTOMY      endometriosis- 8.5 inches removed    COLONOSCOPY  2016    repeat in 5    COLONOSCOPY N/A 06/27/2016    Procedure: COLONOSCOPY;  Surgeon: Avel Luna Jr., MD;  Location: [facility] ENDO;  Service: Endoscopy;  Laterality: N/A; repeat in 5 years for surveillance    COLONOSCOPY N/A 07/27/2020    Procedure: COLONOSCOPY;  Surgeon: Avel Luna Jr., MD;  Location: [facility] ENDO;  Service: Endoscopy;  Laterality: N/A; Irritable bowel syndrome(?). hemorrhoids, 2 colon polyps removed, diverticulosis, repeat in 5 years for surveillance. biopsy: Hyperplastic polyp    CORONARY ANGIOPLASTY WITH STENT PLACEMENT      x 2    EGD - EXTERNAL RESULT  03/20/2019    Dr. Ortiz, sent for scanning: biopsy: random stomach- mild nonspecific inflammation, negative h pylori; GEJ- mild chroni inflammation, negative for merida's esophagus    EGD - EXTERNAL RESULT  08/17/2022    Dr. Ortiz, sent for scanning: reflux esophagitis, stricture at the lower end of esophagus- dilation performed, spasm at lower end of esophagus, acute on chronic gastritis, polyps of the stomach removed, and spasm of pylorus, small lipoma of stomach    ESOPHAGOGASTRODUODENOSCOPY N/A 07/27/2020     Procedure: EGD (ESOPHAGOGASTRODUODENOSCOPY);  Surgeon: Avel De La Cruz Jr., MD;  Location: [facility];  Service: Endoscopy;  Laterality: N/A; unremarkable; biopsy: stomach- Mild chronic gastritis, negative for h pylori. duodenum WNL    HYSTERECTOMY      INCONTINENCE SURGERY      INJECTION OF ANESTHETIC AGENT INTO SACROILIAC JOINT Right 2019    Procedure: BLOCK, SACROILIAC JOINT;  Surgeon: Homer Diamond MD;  Location: [facility];  Service: Pain Management;  Laterality: Right;    INJECTION OF FACET JOINT Right 2019    Procedure: INJECTION, FACET JOINT;  Surgeon: Homer Diamond MD;  Location: [facility];  Service: Pain Management;  Laterality: Right;  L4 and L5    JOINT REPLACEMENT Right     Knee    OOPHORECTOMY      rectocele  2017    repair    Stent OM      UPPER GASTROINTESTINAL ENDOSCOPY  2013 Dr. Peters    reflux esophagitis; biopsy: negative dysplasia, intestinal metaplasia; mild chronic inflammation- GERD related & regenerative glandular epithelial change; strips of squamous esophageal mucosa with moderate basal epithelial cell hyperplasia and rare intraepithelial eosinophils (< 1 per 10 high power fields)    UPPER GASTROINTESTINAL ENDOSCOPY  2016    Dr. de la cruz    URETHRA SURGERY       Family History   Problem Relation Name Age of Onset    Breast cancer Sister      Clotting disorder Maternal Grandmother      Diverticulitis Maternal Grandfather      Diabetes Paternal Grandmother      Anesthesia problems Neg Hx      Ovarian cancer Neg Hx      Colon cancer Neg Hx      Colon polyps Neg Hx      Crohn's disease Neg Hx      Ulcerative colitis Neg Hx      Glaucoma Neg Hx      Macular degeneration Neg Hx       Social History     Tobacco Use    Smoking status: Former     Current packs/day: 0.00     Average packs/day: 0.3 packs/day for 35.0 years (8.8 ttl pk-yrs)     Types: Cigarettes     Start date: 10/7/1964     Quit date: 10/7/1999     Years since quittin.1    Smokeless tobacco: Never    Substance Use Topics    Alcohol use: No    Drug use: No     Wt Readings from Last 10 Encounters:   11/13/24 75.7 kg (166 lb 14.2 oz)   10/10/24 74.6 kg (164 lb 7.4 oz)   10/07/24 74.6 kg (164 lb 7.4 oz)   08/01/24 76.5 kg (168 lb 10.4 oz)   05/21/24 76.6 kg (168 lb 14 oz)   11/08/23 75.7 kg (166 lb 14.2 oz)   08/09/23 76.8 kg (169 lb 5 oz)   04/20/23 77.7 kg (171 lb 4.8 oz)   07/05/22 78.5 kg (173 lb 1 oz)   02/24/22 78.5 kg (173 lb)     Lab Results   Component Value Date    WBC 9.58 06/14/2024    HGB 14.8 06/14/2024    HCT 45.3 06/14/2024    MCV 90 06/14/2024     06/14/2024     CMP  Sodium   Date Value Ref Range Status   11/13/2024 137 136 - 145 mmol/L Final     Potassium   Date Value Ref Range Status   11/13/2024 4.8 3.5 - 5.1 mmol/L Final     Chloride   Date Value Ref Range Status   11/13/2024 101 95 - 110 mmol/L Final     CO2   Date Value Ref Range Status   11/13/2024 29 23 - 29 mmol/L Final     Glucose   Date Value Ref Range Status   11/13/2024 161 (H) 70 - 110 mg/dL Final     BUN   Date Value Ref Range Status   11/13/2024 13 8 - 23 mg/dL Final     Creatinine   Date Value Ref Range Status   11/13/2024 0.8 0.5 - 1.4 mg/dL Final     Calcium   Date Value Ref Range Status   11/13/2024 10.2 8.7 - 10.5 mg/dL Final     Total Protein   Date Value Ref Range Status   09/09/2024 7.3 6.0 - 8.4 g/dL Final     Albumin   Date Value Ref Range Status   09/09/2024 4.1 3.5 - 5.2 g/dL Final     Total Bilirubin   Date Value Ref Range Status   09/09/2024 0.5 0.1 - 1.0 mg/dL Final     Comment:     For infants and newborns, interpretation of results should be based  on gestational age, weight and in agreement with clinical  observations.    Premature Infant recommended reference ranges:  Up to 24 hours.............<8.0 mg/dL  Up to 48 hours............<12.0 mg/dL  3-5 days..................<15.0 mg/dL  6-29 days.................<15.0 mg/dL       Alkaline Phosphatase   Date Value Ref Range Status   09/09/2024 136 (H) 55 - 135  U/L Final     AST   Date Value Ref Range Status   09/09/2024 25 10 - 40 U/L Final     ALT   Date Value Ref Range Status   09/09/2024 32 10 - 44 U/L Final     Anion Gap   Date Value Ref Range Status   11/13/2024 7 (L) 8 - 16 mmol/L Final     eGFR if    Date Value Ref Range Status   06/23/2022 >60 >60 mL/min/1.73 m^2 Final     eGFR if non    Date Value Ref Range Status   06/23/2022 >60 >60 mL/min/1.73 m^2 Final     Comment:     Calculation used to obtain the estimated glomerular filtration  rate (eGFR) is the CKD-EPI equation.        Lab Results   Component Value Date    OCCULTBLOOD Negative 10/17/2020     Lab Results   Component Value Date    LIPASE 30 06/14/2024     Lab Results   Component Value Date    LIPASERES 131 10/17/2020     Lab Results   Component Value Date    AMYLASE 82 04/13/2016     6/15/2021 stool studies reviewed (occasional yeast)    Reviewed prior medical records including radiology report of 11/17/2023 abdominal ultrasound; 6/14/2024 CT abdomen pelvis with IV contrast, 3/13/2019 HIDA scan; 5/10/2017 MRI abdomen; & endoscopy history (see surgical history).    Previously reviewed medical records received from Dr. Ortiz, summarized below and in medical & surgical history (endoscopies, etc), copies made & given to nurse to be scanned into system:   Visit notes: 4/1/2019, 4/15/2019, 6/8/2022, 6/30/2022, 7/13/2022, 8/1/2022, 12/15/2022, 2/16/2023  3/12/2019 discharge summary  Lab results (see records for full results): 6/9/2022 CMP with ALT 38 (H) otherwise LFTs WNL; CBC with H&H 15.2 & 48.3 (H),WBC WNL and platelet count WNL, TSH WNL, ESR WNL  Diagnoses: GERD, bowel adhesions, diverticulosis, partial bowel obstruction  EGDs: 3/20/19 & 8/17/22  Objective:      Physical Exam  Vitals and nursing note reviewed.   Constitutional:       General: She is not in acute distress.     Appearance: Normal appearance. She is well-developed. She is not diaphoretic.   HENT:       Mouth/Throat:      Lips: Pink. No lesions.      Mouth: Mucous membranes are moist. No oral lesions.      Tongue: No lesions.      Pharynx: Oropharynx is clear. No pharyngeal swelling or posterior oropharyngeal erythema.   Eyes:      General: No scleral icterus.     Conjunctiva/sclera: Conjunctivae normal.      Pupils: Pupils are equal, round, and reactive to light.   Pulmonary:      Effort: Pulmonary effort is normal. No respiratory distress.      Breath sounds: Normal breath sounds. No wheezing.   Abdominal:      General: Bowel sounds are normal. There is no distension or abdominal bruit.      Palpations: Abdomen is soft. Abdomen is not rigid. There is no mass.      Tenderness: There is abdominal tenderness (mild) in the epigastric area. There is no guarding or rebound. Negative signs include Leyva's sign and McBurney's sign.   Skin:     General: Skin is warm and dry.      Coloration: Skin is not jaundiced or pale.      Findings: No erythema or rash.   Neurological:      Mental Status: She is alert and oriented to person, place, and time.   Psychiatric:         Behavior: Behavior normal.         Thought Content: Thought content normal.         Judgment: Judgment normal.         Assessment:       1. Upper abdominal pain    2. Heartburn    3. NSAID long-term use    4. S/P cholecystectomy    5. Arthralgia, unspecified joint    6. Irritable bowel syndrome with both constipation and diarrhea    7. History of diverticulosis    8. Rectal pain    9. Rectocele    10. Hepatic steatosis    11. Hepatomegaly    12. Elevated LFTs    13. Anticoagulant long-term use            Plan:       Upper abdominal pain  -  INCREASE TO   omeprazole (PRILOSEC) 40 MG capsule; Take 1 capsule (40 mg total) by mouth before breakfast.  Dispense: 30 capsule; Refill: 1  -     CT Abdomen Pelvis With IV Contrast Routine Oral Contrast; Future; Expected date: 11/13/2024  -     Creatinine, serum; Future; Expected date: 11/13/2024  -     Lipase; Future;  Expected date: 11/13/2024  -     Hepatic Function Panel; Future; Expected date: 11/13/2024  - schedule EGD, discussed procedure with patient, including risks and benefits, patient verbalized understanding  - avoid/minimize use of NSAIDs- since they can cause GI upset, bleeding and/or ulcers. If NSAID must be taken, recommend take with food.  - Recommend follow-up with Primary Care Provider for continued evaluation and management of non-GI related etiologies (possible musculoskeletal).    Heartburn  -  INCREASE TO   omeprazole (PRILOSEC) 40 MG capsule; Take 1 capsule (40 mg total) by mouth before breakfast.  Dispense: 30 capsule; Refill: 1  - schedule EGD, discussed procedure with patient, including risks and benefits, patient verbalized understanding    NSAID long-term use  - schedule EGD, discussed procedure with patient, including risks and benefits, patient verbalized understanding  - avoid/minimize use of NSAIDs- since they can cause GI upset, bleeding and/or ulcers. If NSAID must be taken, recommend take with food.    S/P cholecystectomy  -     CT Abdomen Pelvis With IV Contrast Routine Oral Contrast; Future; Expected date: 11/13/2024    Arthralgia, unspecified joint  - Recommend follow-up with Primary Care Provider & Rheumatology for continued evaluation and management.    Irritable bowel syndrome with both constipation and diarrhea  Comments:  chronic constipation with intermittent diarrhea  - schedule Colonoscopy, discussed procedure with the patient, including risks and benefits, patient verbalized understanding  - recommend OTC probiotic, such as Florastor or Culturelle, taken as directed on packaging  - avoid lactose, alcohol, & caffeine  - avoid known triggers  - Recommend daily exercise as tolerated, adequate water intake (six 8-oz glasses of water daily), and high fiber diet. OTC fiber supplements are recommended if diet does not reach daily fiber goal (20-30 grams daily), such as Metamucil, Citrucel,  or FiberCon (take as directed, separate from other oral medications by >2 hours).  - CONTINUE OTC stool softener such as Colace as directed to avoid hard stools and straining with bowel movements PRN  - CAN CONTINUE OTC MiraLax once daily (17g PO) as directed PRN persistent constipation  -If still no improvement with these measures, call/follow-up    History of diverticulosis  -     CT Abdomen Pelvis With IV Contrast Routine Oral Contrast; Future; Expected date: 11/13/2024  - Recommend high fiber diet (20-30 grams of fiber daily)/OTC fiber supplements daily as directed, such as Benefiber or Metamucil.    Rectal pain  - schedule Colonoscopy, discussed procedure with the patient, including risks and benefits, patient verbalized understanding  - avoid constipation and straining with bowel movements; try using an OTC stool softener as directed and increase fiber in diet (20-30 grams daily)/OTC fiber supplement such as metamucil (take as directed)  - recommend SITZ baths  - possible referral to colorectal surgery if symptoms persist    Rectocele  -     Ambulatory referral/consult to Urogynecology; Future; Expected date: 11/20/2024  -     CT Abdomen Pelvis With IV Contrast Routine Oral Contrast; Future; Expected date: 11/13/2024    Hepatic steatosis, Hepatomegaly, & Elevated LFTs  - For fatty liver recommend: low fat, low cholesterol diet, maintain good control of blood sugars and cholesterol levels, exercise, weight loss (if overweight), minimize/avoid alcohol and tylenol products, & follow-up with hepatology for continued evaluation and management. Referral placed previously.    Anticoagulant long-term use  - anticoagulant(s) will likely need to be held for endoscopy, nurse will confirm with endoscopist, cardiologist, and/or PCP.    Follow up in about 1 month (around 12/13/2024), or if symptoms worsen or fail to improve.    If no improvement in symptoms or symptoms worsen, call/follow-up at clinic or go to ER.        39  minutes of total time spent on the encounter, which includes face to face time and non-face to face time preparing to see the patient (e.g., review of tests), Obtaining and/or reviewing separately obtained history, Documenting clinical information in the electronic or other health record, Independently interpreting results (not separately reported) and communicating results to the patient/family/caregiver, or Care coordination (not separately reported)

## 2024-11-15 ENCOUNTER — OFFICE VISIT (OUTPATIENT)
Dept: OPTOMETRY | Facility: CLINIC | Age: 74
End: 2024-11-15
Payer: MEDICARE

## 2024-11-15 DIAGNOSIS — H25.13 AGE-RELATED NUCLEAR CATARACT, BILATERAL: ICD-10-CM

## 2024-11-15 DIAGNOSIS — H52.203 HYPEROPIA WITH ASTIGMATISM AND PRESBYOPIA, BILATERAL: ICD-10-CM

## 2024-11-15 DIAGNOSIS — H52.4 HYPEROPIA WITH ASTIGMATISM AND PRESBYOPIA, BILATERAL: ICD-10-CM

## 2024-11-15 DIAGNOSIS — H52.03 HYPEROPIA WITH ASTIGMATISM AND PRESBYOPIA, BILATERAL: ICD-10-CM

## 2024-11-15 DIAGNOSIS — E11.9 TYPE 2 DIABETES MELLITUS WITHOUT RETINOPATHY: Primary | ICD-10-CM

## 2024-11-15 DIAGNOSIS — H16.223 KERATOCONJUNCTIVITIS SICCA OF BOTH EYES NOT SPECIFIED AS SJOGREN'S: ICD-10-CM

## 2024-11-15 LAB — BACTERIA UR CULT: ABNORMAL

## 2024-11-15 PROCEDURE — 99999 PR PBB SHADOW E&M-EST. PATIENT-LVL III: CPT | Mod: PBBFAC,,,

## 2024-11-15 RX ORDER — CYCLOSPORINE 0.5 MG/ML
1 EMULSION OPHTHALMIC 2 TIMES DAILY
Qty: 60 EACH | Refills: 11 | Status: SHIPPED | OUTPATIENT
Start: 2024-11-15 | End: 2025-11-15

## 2024-11-15 NOTE — PROGRESS NOTES
HPI    DM eye exam DAVID 2023 (Dr. Arguelles)    Pt complains of blurred va that comes and goes, possibly bsl related. Pt   notices occasional floaters but denies flashes. DM controlled w diet and   monitored by pcp, pt not on medicine to aid DM. HTN controlled w aid.  Gtts: systane TID OU    Hemoglobin A1C       Date                     Value               Ref Range             Status                09/09/2024               8.4 (H)             4.0 - 5.6 %           Final                     08/22/2024               9.0 (H)             4.8 - 6.0 %           Final              Last edited by Donald Bagley, OD on 11/15/2024  2:42 PM.            Assessment /Plan     For exam results, see Encounter Report.    Type 2 diabetes mellitus without retinopathy    Keratoconjunctivitis sicca of both eyes not specified as Sjogren's  -     RESTASIS 0.05 % ophthalmic emulsion; Place 1 drop into both eyes 2 (two) times daily.  Dispense: 60 each; Refill: 11    Age-related nuclear cataract, bilateral    Hyperopia with astigmatism and presbyopia, bilateral      No diabetic retinopathy or macular edema evident on today's exam OD, OS. Ed pt on importance of maintaining strict BS control due to potential for visual fluctuations and/or vision loss. Monitor with yearly dilated exam.  Longstanding dry eye signs and symptoms. Pt using Systane BID-QID OU with relief along with gel drop/jesus QHS OU. Ed pt on nature/chronicity of dry eye and Rx'd Restasis to take BID OU. Ed pt that it may take 6-12 weeks to see full effects and to continue Systane in-between for increased comfort. Recommended Lumify to help with redness relief. Monitor yearly for changes, sooner prn.  Mild, not yet visually significant. Surgery not recommended at this time. Ed pt on symptoms of worsening cataracts including reduced BCVA and glare. Monitor yearly for changes.  Discussed spectacle options with pt and released final spec rx. Ed pt on change in rx and  adaptation.    RTC: 1 year for comprehensive exam or sooner prn

## 2024-12-03 ENCOUNTER — HOSPITAL ENCOUNTER (OUTPATIENT)
Dept: RADIOLOGY | Facility: HOSPITAL | Age: 74
Discharge: HOME | End: 2024-12-03
Attending: NURSE PRACTITIONER
Payer: MEDICARE

## 2024-12-03 DIAGNOSIS — Z90.49 S/P CHOLECYSTECTOMY: ICD-10-CM

## 2024-12-03 DIAGNOSIS — R10.10 UPPER ABDOMINAL PAIN: ICD-10-CM

## 2024-12-03 DIAGNOSIS — N81.6 RECTOCELE: ICD-10-CM

## 2024-12-03 DIAGNOSIS — Z87.19 HISTORY OF DIVERTICULOSIS: ICD-10-CM

## 2024-12-03 PROCEDURE — 74177 CT ABD & PELVIS W/CONTRAST: CPT | Mod: TC,PO

## 2024-12-03 PROCEDURE — 74177 CT ABD & PELVIS W/CONTRAST: CPT | Mod: 26,,, | Performed by: STUDENT IN AN ORGANIZED HEALTH CARE EDUCATION/TRAINING PROGRAM

## 2024-12-03 PROCEDURE — A9698 NON-RAD CONTRAST MATERIALNOC: HCPCS | Mod: PO | Performed by: NURSE PRACTITIONER

## 2024-12-03 PROCEDURE — 25500020 PHARM REV CODE 255: Mod: PO | Performed by: NURSE PRACTITIONER

## 2024-12-03 RX ADMIN — BARIUM SULFATE 900 ML: 20 SUSPENSION ORAL at 02:12

## 2024-12-03 RX ADMIN — IOHEXOL 75 ML: 350 INJECTION, SOLUTION INTRAVENOUS at 02:12

## 2024-12-05 ENCOUNTER — TELEPHONE (OUTPATIENT)
Dept: GASTROENTEROLOGY | Facility: CLINIC | Age: 74
End: 2024-12-05
Payer: MEDICARE

## 2024-12-05 DIAGNOSIS — R74.01 ELEVATED ALT MEASUREMENT: ICD-10-CM

## 2024-12-05 DIAGNOSIS — K76.0 HEPATIC STEATOSIS: Primary | ICD-10-CM

## 2024-12-05 NOTE — TELEPHONE ENCOUNTER
----- Message from Esme sent at 12/5/2024  9:55 AM CST -----  Type:  Test Results    Who Called: pt     Name of Test (Lab/Mammo/Etc): lab & ct    Date of Test: 12/03    Ordering Provider: oscar     Where the test was performed: [facility]     Would the patient rather a call back or a response via [facility]? Call back     Best Call Back Number: [phone]    Additional Information:  pt would like to discuss results.    Please call back to advise. Thank you.

## 2024-12-05 NOTE — TELEPHONE ENCOUNTER
"Please call to inform & review the results with the patient- radiology report of the CT scan of the abdomen and pelvis showed " No acute abdominopelvic abnormality."  Diverticulosis noted without signs of diverticulitis (no infection/inflammation). Moderate stool in colon. Recommend high fiber diet (20-30 grams of fiber daily)/OTC fiber supplements daily as directed, such as Benefiber or Metamucil.  Fatty liver noted as noted on prior ultrasound. Blood work showed mild elevation of one of the liver function levels. Recommend low fat, low cholesterol diet, maintain good control of blood sugars and cholesterol levels, exercise, weight loss (if overweight), minimize/avoid alcohol and tylenol products, & follow-up with hepatology for continued evaluation and management (referral placed).  Otherwise, lab results were unremarkable.    Continue with previous recommendations. If no improvement in symptoms or symptoms worsen, call/follow-up at clinic or go to ER.    Thanks,      "

## 2024-12-05 NOTE — TELEPHONE ENCOUNTER
"Returned call to the patient, patient notified of the results below from Lesvia Azevedo NP, patient verbalized understanding of this.    Per Lesvia Azevedo NP-  Please call to inform & review the results with the patient- radiology report of the CT scan of the abdomen and pelvis showed " No acute abdominopelvic abnormality."  Diverticulosis noted without signs of diverticulitis (no infection/inflammation). Moderate stool in colon. Recommend high fiber diet (20-30 grams of fiber daily)/OTC fiber supplements daily as directed, such as Benefiber or Metamucil.  Fatty liver noted as noted on prior ultrasound. Blood work showed mild elevation of one of the liver function levels. Recommend low fat, low cholesterol diet, maintain good control of blood sugars and cholesterol levels, exercise, weight loss (if overweight), minimize/avoid alcohol and tylenol products, & follow-up with hepatology for continued evaluation and management (referral placed).  Otherwise, lab results were unremarkable.     Continue with previous recommendations. If no improvement in symptoms or symptoms worsen, call/follow-up at clinic or go to ER.     Thanks,       "

## 2024-12-05 NOTE — TELEPHONE ENCOUNTER
----- Message from Vannessa sent at 12/5/2024 10:41 AM CST -----  Regarding: call back  Contact: pt  Type: Needs Medical Advice  Who Called:  patient   Symptoms (please be specific):    How long has patient had these symptoms:    Pharmacy name and phone #:    Best Call Back Number: [phone]    Additional Information: chacha rome pt is seeking to speak u call regarding liver count number are u available to assist MRN: [id number] JOSH HANNON

## 2024-12-05 NOTE — TELEPHONE ENCOUNTER
"Returned call to the patient, ludy reviewed with patient again, patient verbalized understanding of this.    Please call to inform & review the results with the patient- radiology report of the CT scan of the abdomen and pelvis showed " No acute abdominopelvic abnormality."  Diverticulosis noted without signs of diverticulitis (no infection/inflammation). Moderate stool in colon. Recommend high fiber diet (20-30 grams of fiber daily)/OTC fiber supplements daily as directed, such as Benefiber or Metamucil.  Fatty liver noted as noted on prior ultrasound. Blood work showed mild elevation of one of the liver function levels. Recommend low fat, low cholesterol diet, maintain good control of blood sugars and cholesterol levels, exercise, weight loss (if overweight), minimize/avoid alcohol and tylenol products, & follow-up with hepatology for continued evaluation and management (referral placed).  Otherwise, lab results were unremarkable.     Continue with previous recommendations. If no improvement in symptoms or symptoms worsen, call/follow-up at clinic or go to ER.     Thanks,       "

## 2024-12-11 ENCOUNTER — OFFICE VISIT (OUTPATIENT)
Dept: OBSTETRICS AND GYNECOLOGY | Facility: CLINIC | Age: 74
End: 2024-12-11
Payer: MEDICARE

## 2024-12-11 VITALS
RESPIRATION RATE: 18 BRPM | BODY MASS INDEX: 33.71 KG/M2 | HEIGHT: 59 IN | DIASTOLIC BLOOD PRESSURE: 76 MMHG | SYSTOLIC BLOOD PRESSURE: 132 MMHG

## 2024-12-11 DIAGNOSIS — Z01.419 WOMEN'S ANNUAL ROUTINE GYNECOLOGICAL EXAMINATION: Primary | ICD-10-CM

## 2024-12-11 PROCEDURE — 99999 PR PBB SHADOW E&M-EST. PATIENT-LVL III: CPT | Mod: PBBFAC,,, | Performed by: OBSTETRICS & GYNECOLOGY

## 2024-12-11 RX ORDER — ESTRADIOL 0.1 MG/G
2 CREAM VAGINAL
Qty: 42.5 G | Refills: 3 | Status: SHIPPED | OUTPATIENT
Start: 2024-12-12

## 2024-12-11 RX ORDER — CONJUGATED ESTROGENS 0.62 MG/G
1.5 CREAM VAGINAL
Qty: 42.5 G | Refills: 6 | Status: SHIPPED | OUTPATIENT
Start: 2024-12-12

## 2024-12-11 NOTE — PROGRESS NOTES
Chief Complaint   Patient presents with    Well Woman       History and Physical:  Patient's last menstrual period was 10/07/1976.       Herminia Wooten is a 74 y.o.  female who presents today for her routine annual GYN exam. The patient has no Gynecology complaints today. Cb       Allergies:   Review of patient's allergies indicates:   Allergen Reactions    Codeine Other (See Comments)     nervousness    Aliskiren      Other reaction(s): Other (See Comments)    Benazepril      Other reaction(s): Other (See Comments)    Ezetimibe-simvastatin      Other reaction(s): Other (See Comments)    Irbesartan      Other reaction(s): Other (See Comments)    Caffeine Anxiety     In medications    Percodan [oxycodone hcl-oxycodone-asa] Anxiety       Past Medical History:   Diagnosis Date    Anesthesia complication     elevated BP    Colon polyp     Coronary artery disease     Diabetes mellitus     Diverticulitis     Diverticulosis     Encounter for blood transfusion     Fatty liver 04/25/2016    Gallbladder polyp     GERD (gastroesophageal reflux disease)     Hyperlipidemia     Hypertension     Hypothyroid     Irritable bowel syndrome     Kidney stone     Liver hemangioma     Partial bowel obstruction     Seasonal allergies     Thyroid disease        Past Surgical History:   Procedure Laterality Date    ABDOMINAL SURGERY      APPENDECTOMY      CARDIAC CATHETERIZATION      2 stents    CHOLECYSTECTOMY  04/04/2019    Dr. Ortiz: report and biopsy sent for scanning    COLECTOMY      endometriosis- 8.5 inches removed    COLONOSCOPY  2016    repeat in 5    COLONOSCOPY N/A 06/27/2016    Procedure: COLONOSCOPY;  Surgeon: Avel Luna Jr., MD;  Location: [facility] ENDO;  Service: Endoscopy;  Laterality: N/A; repeat in 5 years for surveillance    COLONOSCOPY N/A 07/27/2020    Procedure: COLONOSCOPY;  Surgeon: Avel Luna Jr., MD;  Location: [facility];  Service: Endoscopy;  Laterality: N/A; Irritable bowel syndrome(?).  hemorrhoids, 2 colon polyps removed, diverticulosis, repeat in 5 years for surveillance. biopsy: Hyperplastic polyp    CORONARY ANGIOPLASTY WITH STENT PLACEMENT      x 2    EGD - EXTERNAL RESULT  03/20/2019    Dr. Ortiz, sent for scanning: biopsy: random stomach- mild nonspecific inflammation, negative h pylori; GEJ- mild chroni inflammation, negative for merida's esophagus    EGD - EXTERNAL RESULT  08/17/2022    Dr. Ortiz, sent for scanning: reflux esophagitis, stricture at the lower end of esophagus- dilation performed, spasm at lower end of esophagus, acute on chronic gastritis, polyps of the stomach removed, and spasm of pylorus, small lipoma of stomach    ESOPHAGOGASTRODUODENOSCOPY N/A 07/27/2020    Procedure: EGD (ESOPHAGOGASTRODUODENOSCOPY);  Surgeon: Avel Luna Jr., MD;  Location: [facility];  Service: Endoscopy;  Laterality: N/A; unremarkable; biopsy: stomach- Mild chronic gastritis, negative for h pylori. duodenum WNL    HYSTERECTOMY      INCONTINENCE SURGERY      INJECTION OF ANESTHETIC AGENT INTO SACROILIAC JOINT Right 07/03/2019    Procedure: BLOCK, SACROILIAC JOINT;  Surgeon: Homer Diamond MD;  Location: [facility];  Service: Pain Management;  Laterality: Right;    INJECTION OF FACET JOINT Right 08/28/2019    Procedure: INJECTION, FACET JOINT;  Surgeon: Homer Diamond MD;  Location: [facility];  Service: Pain Management;  Laterality: Right;  L4 and L5    JOINT REPLACEMENT Right     Knee    OOPHORECTOMY      rectocele  05/2017    repair    Stent OM      UPPER GASTROINTESTINAL ENDOSCOPY  6/2013 Dr. Peters    reflux esophagitis; biopsy: negative dysplasia, intestinal metaplasia; mild chronic inflammation- GERD related & regenerative glandular epithelial change; strips of squamous esophageal mucosa with moderate basal epithelial cell hyperplasia and rare intraepithelial eosinophils (< 1 per 10 high power fields)    UPPER GASTROINTESTINAL ENDOSCOPY  06/2016    Dr. luna    URETHRA SURGERY          MEDS:   Current Outpatient Medications on File Prior to Visit   Medication Sig Dispense Refill    aluminum & magnesium hydroxide-simethicone (MYLANTA MAX STRENGTH) 400-400-40 mg/5 mL suspension Take by mouth every 6 (six) hours as needed for Indigestion.      amLODIPine (NORVASC) 5 MG tablet Take 5 mg by mouth.      aspirin (ECOTRIN) 81 MG EC tablet Take 81 mg by mouth.      atorvastatin (LIPITOR) 20 MG tablet Take 20 mg by mouth once daily.      augmented betamethasone (DIPROLENE) 0.05 % gel FCO THIN FILM AA 4 TO 6 TIMES D. DO NOT USE FOR LONGER THAN 14 DAYS  1    betamethasone valerate 0.1% (VALISONE) 0.1 % Crea Apply 1 application topically daily as needed.       blood-glucose sensor (Transmode SystemsSTYLE JEWEL 3 SENSOR) Candace Change every 14 days 2 each 6    calcium carbonate (CALCIUM ANTACID) 300 mg (750 mg) Chew Take by mouth daily as needed.      cholecalciferol, vitamin D3, (VITAMIN D3) 50 mcg (2,000 unit) Tab Take 1 tablet by mouth once daily.       clobetasoL (TEMOVATE) 0.05 % cream APPLY 1/2 GRAM TOPICALLY twice weekly 30 g 1    docusate sodium (COLACE) 100 MG capsule Take 100 mg by mouth once daily.      estradioL (ESTRACE) 0.01 % (0.1 mg/gram) vaginal cream PLACE 2 GRAMS VAGINALLY 2 TIMES A WEEK 42.5 g 3    FLOVENT  mcg/actuation inhaler INHALE 1 PUFF PO Q 12 H  4    fluticasone (FLONASE) 50 mcg/actuation nasal spray 1 spray by Each Nare route 2 (two) times daily as needed.       ibuprofen (ADVIL,MOTRIN) 100 MG tablet Take 100 mg by mouth every 6 (six) hours as needed for Pain.      levothyroxine (SYNTHROID) 25 MCG tablet Take 25 mcg by mouth once daily.       lorazepam (ATIVAN) 0.5 MG tablet Take 0.5 mg by mouth every 12 (twelve) hours as needed.      losartan (COZAAR) 50 MG tablet Take 100 mg by mouth once daily.       omeprazole (PRILOSEC) 40 MG capsule Take 1 capsule (40 mg total) by mouth before breakfast. 30 capsule 1    RESTASIS 0.05 % ophthalmic emulsion Place 1 drop into both eyes 2 (two)  times daily. 60 each 11     No current facility-administered medications on file prior to visit.       OB History          1    Para   1    Term   1            AB        Living             SAB        IAB        Ectopic        Multiple        Live Births                     Social History     Socioeconomic History    Marital status:    Tobacco Use    Smoking status: Former     Current packs/day: 0.00     Average packs/day: 0.3 packs/day for 35.0 years (8.8 ttl pk-yrs)     Types: Cigarettes     Start date: 10/7/1964     Quit date: 10/7/1999     Years since quittin.1    Smokeless tobacco: Never   Substance and Sexual Activity    Alcohol use: No    Drug use: No    Sexual activity: Yes     Partners: Male     Birth control/protection: See Surgical Hx     Social Drivers of Health     Food Insecurity: No Food Insecurity (2023)    Hunger Vital Sign     Worried About Running Out of Food in the Last Year: Never true     Ran Out of Food in the Last Year: Never true   Transportation Needs: No Transportation Needs (2023)    PRAPARE - Transportation     Lack of Transportation (Medical): No     Lack of Transportation (Non-Medical): No   Physical Activity: Insufficiently Active (2023)    Exercise Vital Sign     Days of Exercise per Week: 3 days     Minutes of Exercise per Session: 30 min   Stress: Stress Concern Present (2023)    [employer] - Occupational Stress Questionnaire     Feeling of Stress : To some extent   Housing Stability: Low Risk  (2023)    Housing Stability Vital Sign     Unable to Pay for Housing in the Last Year: No     Number of Places Lived in the Last Year: 1     Unstable Housing in the Last Year: No       Family History   Problem Relation Name Age of Onset    Breast cancer Sister      Clotting disorder Maternal Grandmother      Diverticulitis Maternal Grandfather      Diabetes Paternal Grandmother      Anesthesia problems Neg Hx    "   Ovarian cancer Neg Hx      Colon cancer Neg Hx      Colon polyps Neg Hx      Crohn's disease Neg Hx      Ulcerative colitis Neg Hx      Glaucoma Neg Hx      Macular degeneration Neg Hx           Past medical and surgical history reviewed.   I have reviewed the patient's medical history in detail and updated the computerized patient record.        Review of System:   General: no chills, fever, night sweats, weight gain or weight loss  Psychological: no depression or suicidal ideation  Breasts: no new or changing breast lumps, nipple discharge or masses.  Respiratory: no cough, shortness of breath, or wheezing  Cardiovascular: no chest pain or dyspnea on exertion  Gastrointestinal: no abdominal pain, change in bowel habits, or black or bloody stools  Genito-Urinary: no incontinence, urinary frequency/urgency or vulvar/vaginal symptoms, pelvic pain or abnormal vaginal bleeding.  Musculoskeletal: no gait disturbance or muscular weakness      Physical Exam:   /76   Resp 18   Ht 4' 11" (1.499 m)   LMP 10/07/1976   BMI 33.71 kg/m²   Constitutional: She is oriented to person, place, and time. She appears well-developed and well-nourished. No distress. OverWeight  HENT:   Head: Normocephalic and atraumatic.   Eyes: Conjunctivae and EOM are normal. No scleral icterus.   Neck: Normal range of motion. Neck supple. No tracheal deviation present.   Cardiovascular: Normal rate.    Pulmonary/Chest: Effort normal. No respiratory distress. She exhibits no tenderness.  Breasts: are symmetrical.   Right breast exhibits no inverted nipple, no mass, no nipple discharge, no skin change and no tenderness.   Left breast exhibits no inverted nipple, no mass, no nipple discharge, no skin change and no tenderness.  Abdominal: Soft. She exhibits no distension and no mass. There is no tenderness. There is no rebound and no guarding.   Genitourinary:    External rectal exam shows no thrombosed external hemorrhoids.    Pelvic exam was " performed with patient supine.   No labial fusion.   There is no rash, lesion or injury on the right labia.   There is no rash, lesion or injury on the left labia.   No bleeding and no signs of injury around the vaginal introitus, urethra is without lesions and well supported.    No vaginal discharge found.   No significant Cystocele, Enterocele or rectocele, and cuff well supported.   Bimanual exam:   The urethra and vagina are without palpable masses or tenderness.   Uterus and cervix are surgically absents, vaginal cuff is intact and well supported.   Right adnexum displays no mass and no tenderness.   Left adnexum displays no mass and no tenderness.  Musculoskeletal: Normal range of motion.   Neurological: She is alert and oriented to person, place, and time. Coordination normal.   Skin: Skin is warm and dry. She is not diaphoretic.   Psychiatric: She has a normal mood and affect.        Assessment:   Normal annual GYN exam      Plan:   PAP Not Needed  Mammogram  Refil lestrogen cream  Follow up in 2 years.

## 2024-12-18 ENCOUNTER — LAB VISIT (OUTPATIENT)
Dept: LAB | Facility: HOSPITAL | Age: 74
End: 2024-12-18
Payer: MEDICARE

## 2024-12-18 DIAGNOSIS — Z11.9 ENCOUNTER FOR SCREENING FOR INFECTIOUS AND PARASITIC DISEASES, UNSPECIFIED: ICD-10-CM

## 2024-12-18 DIAGNOSIS — M25.50 PAIN IN UNSPECIFIED JOINT: Primary | ICD-10-CM

## 2024-12-18 LAB
ALBUMIN SERPL BCP-MCNC: 4.3 G/DL (ref 3.5–5.2)
ALP SERPL-CCNC: 122 U/L (ref 40–150)
ALT SERPL W/O P-5'-P-CCNC: 31 U/L (ref 10–44)
ANION GAP SERPL CALC-SCNC: 11 MMOL/L (ref 8–16)
AST SERPL-CCNC: 24 U/L (ref 10–40)
BASOPHILS # BLD AUTO: 0.04 K/UL (ref 0–0.2)
BASOPHILS NFR BLD: 0.5 % (ref 0–1.9)
BILIRUB SERPL-MCNC: 0.4 MG/DL (ref 0.1–1)
BUN SERPL-MCNC: 11 MG/DL (ref 8–23)
CALCIUM SERPL-MCNC: 10.3 MG/DL (ref 8.7–10.5)
CCP AB SER IA-ACNC: <0.5 U/ML
CHLORIDE SERPL-SCNC: 101 MMOL/L (ref 95–110)
CO2 SERPL-SCNC: 28 MMOL/L (ref 23–29)
CREAT SERPL-MCNC: 0.9 MG/DL (ref 0.5–1.4)
CRP SERPL-MCNC: 11.4 MG/L (ref 0–8.2)
DIFFERENTIAL METHOD BLD: ABNORMAL
EOSINOPHIL # BLD AUTO: 0.2 K/UL (ref 0–0.5)
EOSINOPHIL NFR BLD: 2.3 % (ref 0–8)
ERYTHROCYTE [DISTWIDTH] IN BLOOD BY AUTOMATED COUNT: 13.3 % (ref 11.5–14.5)
ERYTHROCYTE [SEDIMENTATION RATE] IN BLOOD BY PHOTOMETRIC METHOD: 23 MM/HR (ref 0–36)
EST. GFR  (NO RACE VARIABLE): >60 ML/MIN/1.73 M^2
GLUCOSE SERPL-MCNC: 225 MG/DL (ref 70–110)
HBV SURFACE AB SER-ACNC: <3 MIU/ML
HBV SURFACE AB SER-ACNC: NORMAL M[IU]/ML
HBV SURFACE AG SERPL QL IA: NORMAL
HCT VFR BLD AUTO: 43.9 % (ref 37–48.5)
HCV AB SERPL QL IA: NORMAL
HGB BLD-MCNC: 13.9 G/DL (ref 12–16)
IMM GRANULOCYTES # BLD AUTO: 0.02 K/UL (ref 0–0.04)
IMM GRANULOCYTES NFR BLD AUTO: 0.2 % (ref 0–0.5)
LYMPHOCYTES # BLD AUTO: 1.7 K/UL (ref 1–4.8)
LYMPHOCYTES NFR BLD: 20.9 % (ref 18–48)
MCH RBC QN AUTO: 29.3 PG (ref 27–31)
MCHC RBC AUTO-ENTMCNC: 31.7 G/DL (ref 32–36)
MCV RBC AUTO: 92 FL (ref 82–98)
MONOCYTES # BLD AUTO: 0.5 K/UL (ref 0.3–1)
MONOCYTES NFR BLD: 6.5 % (ref 4–15)
NEUTROPHILS # BLD AUTO: 5.7 K/UL (ref 1.8–7.7)
NEUTROPHILS NFR BLD: 69.6 % (ref 38–73)
NRBC BLD-RTO: 0 /100 WBC
PLATELET # BLD AUTO: 266 K/UL (ref 150–450)
PMV BLD AUTO: 11.5 FL (ref 9.2–12.9)
POTASSIUM SERPL-SCNC: 3.9 MMOL/L (ref 3.5–5.1)
PROT SERPL-MCNC: 8 G/DL (ref 6–8.4)
RBC # BLD AUTO: 4.75 M/UL (ref 4–5.4)
RHEUMATOID FACT SERPL-ACNC: <13 IU/ML (ref 0–15)
SODIUM SERPL-SCNC: 140 MMOL/L (ref 136–145)
WBC # BLD AUTO: 8.14 K/UL (ref 3.9–12.7)

## 2024-12-18 PROCEDURE — 86140 C-REACTIVE PROTEIN: CPT

## 2024-12-18 PROCEDURE — 86706 HEP B SURFACE ANTIBODY: CPT | Mod: 91

## 2024-12-18 PROCEDURE — 87340 HEPATITIS B SURFACE AG IA: CPT

## 2024-12-18 PROCEDURE — 86480 TB TEST CELL IMMUN MEASURE: CPT

## 2024-12-18 PROCEDURE — 86431 RHEUMATOID FACTOR QUANT: CPT

## 2024-12-18 PROCEDURE — 36415 COLL VENOUS BLD VENIPUNCTURE: CPT | Mod: PO

## 2024-12-18 PROCEDURE — 86200 CCP ANTIBODY: CPT

## 2024-12-18 PROCEDURE — 80053 COMPREHEN METABOLIC PANEL: CPT

## 2024-12-18 PROCEDURE — 85025 COMPLETE CBC W/AUTO DIFF WBC: CPT

## 2024-12-18 PROCEDURE — 85652 RBC SED RATE AUTOMATED: CPT

## 2024-12-18 PROCEDURE — 86038 ANTINUCLEAR ANTIBODIES: CPT

## 2024-12-18 PROCEDURE — 86803 HEPATITIS C AB TEST: CPT

## 2024-12-19 LAB — ANA SER QL IF: NORMAL

## 2024-12-20 LAB
GAMMA INTERFERON BACKGROUND BLD IA-ACNC: 0.01 IU/ML
M TB IFN-G CD4+ BCKGRND COR BLD-ACNC: -0 IU/ML
M TB IFN-G CD4+ BCKGRND COR BLD-ACNC: 0 IU/ML
MITOGEN IGNF BCKGRD COR BLD-ACNC: 9.99 IU/ML
TB GOLD PLUS: NEGATIVE

## 2024-12-26 ENCOUNTER — TELEPHONE (OUTPATIENT)
Dept: HEPATOLOGY | Facility: CLINIC | Age: 74
End: 2024-12-26
Payer: MEDICARE

## 2024-12-27 ENCOUNTER — PATIENT MESSAGE (OUTPATIENT)
Dept: OPTOMETRY | Facility: CLINIC | Age: 74
End: 2024-12-27
Payer: MEDICARE

## 2025-01-09 ENCOUNTER — HOSPITAL ENCOUNTER (OUTPATIENT)
Dept: RADIOLOGY | Facility: HOSPITAL | Age: 75
Discharge: HOME | End: 2025-01-09
Attending: STUDENT IN AN ORGANIZED HEALTH CARE EDUCATION/TRAINING PROGRAM
Payer: MEDICARE

## 2025-01-09 DIAGNOSIS — M25.561 PAIN IN RIGHT KNEE: ICD-10-CM

## 2025-01-09 DIAGNOSIS — M25.551 PAIN IN RIGHT HIP: ICD-10-CM

## 2025-01-09 PROCEDURE — 73721 MRI JNT OF LWR EXTRE W/O DYE: CPT | Mod: TC,PO,RT

## 2025-01-09 PROCEDURE — 73721 MRI JNT OF LWR EXTRE W/O DYE: CPT | Mod: 26,RT,, | Performed by: RADIOLOGY

## 2025-01-27 ENCOUNTER — LAB VISIT (OUTPATIENT)
Dept: LAB | Facility: HOSPITAL | Age: 75
End: 2025-01-27
Attending: NURSE PRACTITIONER
Payer: MEDICARE

## 2025-01-27 DIAGNOSIS — E11.65 TYPE 2 DIABETES MELLITUS WITH HYPERGLYCEMIA, WITHOUT LONG-TERM CURRENT USE OF INSULIN: ICD-10-CM

## 2025-01-27 LAB
ALBUMIN/CREAT UR: 63.6 UG/MG (ref 0–30)
CREAT UR-MCNC: 33 MG/DL (ref 15–325)
MICROALBUMIN UR DL<=1MG/L-MCNC: 21 UG/ML

## 2025-01-27 PROCEDURE — 82043 UR ALBUMIN QUANTITATIVE: CPT | Performed by: NURSE PRACTITIONER

## 2025-02-04 ENCOUNTER — OFFICE VISIT (OUTPATIENT)
Dept: ENDOCRINOLOGY | Facility: CLINIC | Age: 75
End: 2025-02-04
Payer: MEDICARE

## 2025-02-04 VITALS
DIASTOLIC BLOOD PRESSURE: 70 MMHG | OXYGEN SATURATION: 97 % | HEIGHT: 59 IN | SYSTOLIC BLOOD PRESSURE: 142 MMHG | BODY MASS INDEX: 33.89 KG/M2 | HEART RATE: 79 BPM | WEIGHT: 168.13 LBS

## 2025-02-04 DIAGNOSIS — E66.09 CLASS 1 OBESITY DUE TO EXCESS CALORIES WITH BODY MASS INDEX (BMI) OF 33.0 TO 33.9 IN ADULT, UNSPECIFIED WHETHER SERIOUS COMORBIDITY PRESENT: ICD-10-CM

## 2025-02-04 DIAGNOSIS — I25.10 CORONARY ARTERY DISEASE DUE TO CALCIFIED CORONARY LESION: ICD-10-CM

## 2025-02-04 DIAGNOSIS — I10 ESSENTIAL HYPERTENSION: ICD-10-CM

## 2025-02-04 DIAGNOSIS — E78.00 HYPERCHOLESTEROLEMIA: ICD-10-CM

## 2025-02-04 DIAGNOSIS — E11.65 TYPE 2 DIABETES MELLITUS WITH HYPERGLYCEMIA, WITHOUT LONG-TERM CURRENT USE OF INSULIN: Primary | ICD-10-CM

## 2025-02-04 DIAGNOSIS — E66.811 CLASS 1 OBESITY DUE TO EXCESS CALORIES WITH BODY MASS INDEX (BMI) OF 33.0 TO 33.9 IN ADULT, UNSPECIFIED WHETHER SERIOUS COMORBIDITY PRESENT: ICD-10-CM

## 2025-02-04 DIAGNOSIS — I25.84 CORONARY ARTERY DISEASE DUE TO CALCIFIED CORONARY LESION: ICD-10-CM

## 2025-02-04 DIAGNOSIS — E03.9 ACQUIRED HYPOTHYROIDISM: ICD-10-CM

## 2025-02-04 PROBLEM — E66.813 CLASS 3 OBESITY: Status: RESOLVED | Noted: 2024-10-07 | Resolved: 2025-02-04

## 2025-02-04 LAB — GLUCOSE SERPL-MCNC: 158 MG/DL (ref 70–110)

## 2025-02-04 PROCEDURE — 99999 PR PBB SHADOW E&M-EST. PATIENT-LVL IV: CPT | Mod: PBBFAC,,, | Performed by: NURSE PRACTITIONER

## 2025-02-04 PROCEDURE — 1101F PT FALLS ASSESS-DOCD LE1/YR: CPT | Mod: CPTII,S$GLB,, | Performed by: NURSE PRACTITIONER

## 2025-02-04 PROCEDURE — G2211 COMPLEX E/M VISIT ADD ON: HCPCS | Mod: S$GLB,,, | Performed by: NURSE PRACTITIONER

## 2025-02-04 PROCEDURE — 3077F SYST BP >= 140 MM HG: CPT | Mod: CPTII,S$GLB,, | Performed by: NURSE PRACTITIONER

## 2025-02-04 PROCEDURE — 82962 GLUCOSE BLOOD TEST: CPT | Mod: S$GLB,,, | Performed by: NURSE PRACTITIONER

## 2025-02-04 PROCEDURE — 1159F MED LIST DOCD IN RCRD: CPT | Mod: CPTII,S$GLB,, | Performed by: NURSE PRACTITIONER

## 2025-02-04 PROCEDURE — 99213 OFFICE O/P EST LOW 20 MIN: CPT | Mod: S$GLB,,, | Performed by: NURSE PRACTITIONER

## 2025-02-04 PROCEDURE — 3288F FALL RISK ASSESSMENT DOCD: CPT | Mod: CPTII,S$GLB,, | Performed by: NURSE PRACTITIONER

## 2025-02-04 PROCEDURE — 3072F LOW RISK FOR RETINOPATHY: CPT | Mod: CPTII,S$GLB,, | Performed by: NURSE PRACTITIONER

## 2025-02-04 PROCEDURE — 1125F AMNT PAIN NOTED PAIN PRSNT: CPT | Mod: CPTII,S$GLB,, | Performed by: NURSE PRACTITIONER

## 2025-02-04 PROCEDURE — 3078F DIAST BP <80 MM HG: CPT | Mod: CPTII,S$GLB,, | Performed by: NURSE PRACTITIONER

## 2025-02-04 PROCEDURE — 3051F HG A1C>EQUAL 7.0%<8.0%: CPT | Mod: CPTII,S$GLB,, | Performed by: NURSE PRACTITIONER

## 2025-02-04 NOTE — PROGRESS NOTES
CC: This 74 y.o. female presents for management of diabetes  and chronic conditions pending review including HTN, HLP, CAD, obesity    HPI: She was diagnosed with T2DM in ~ 2019.  Has never been hospitalized r/t DM.  Family hx of DM: PGM     No acute events since her last visit   Neck sx 4/7 w Dr Sampson  Having back injections Dr. Diamond on 2/11/2025  hypoglycemia at home- none  monitoring BG at home:   Does not check often  Reports 100-150 fasting  BG in office 158 (2 hrs pp)  At last visit, wanted to trial natural medication- recommended berberine and cinnamon- Did not start     Diet: Eats 3 Meals a day, snacks- muffins   24 hr recall  B- [employer] cereal   L- [employer]- chicken and broccoli   Exercise: none  CURRENT DM MEDS: none  Previous meds- jardiance   Ozempic- GI issues  Glucometer type: True Metrix     Standards of Care:  Eye exam: Dr Gordon~ 2024     Regarding Hypothyroidism   Taking LT4 25 mcg daily    ROS:   Gen: Appetite good, weight gain 4 lbs  Eyes: Denies visual disturbances  Resp: no SOB or GREENE   Cardiac: + palpitations she reports this is anxiety related   GI: No nausea or vomiting,+ diarrhea, no constipation   /GYN: 1+ nocturia, no burning or pain.   MS/Neuro: speech clear  Psych: Denies drug/ETOH abuse, + anxiety.  Other systems: negative.    PE:  GENERAL: Well developed, well nourished.  PSYCH: AAOx3, appropriate mood and affect, pleasant expression, conversant, appears relaxed, well groomed.   EYES: Conjunctiva, corneas clear  NECK: Supple, trachea midline   SKIN:   no acanthosis nigracans.  FOOT EXAMINATION: 10/7/2024  No foot deformity, corns or callus formation, Onychomycosis, nails in good condition and well trimmed, no interspace maceration or ulceration noted.  Decreased hair growth present over toes/feet.  Protective sensation intact with 10 gram monofilament.  +2 dorsalis pedis and posterior pulses noted.     Personally reviewed Past Medical, Surgical, Social History.    BP (!) 142/70  "(BP Location: Left arm, Patient Position: Sitting)   Pulse 79   Ht 4' 11" (1.499 m)   Wt 76.2 kg (168 lb 1.6 oz)   LMP 10/07/1976   SpO2 97%   BMI 33.95 kg/m²      Personally reviewed the below labs:      Chemistry        Component Value Date/Time     12/18/2024 1459    K 3.9 12/18/2024 1459     12/18/2024 1459    CO2 28 12/18/2024 1459    BUN 11 12/18/2024 1459    CREATININE 0.9 12/18/2024 1459     (H) 12/18/2024 1459        Component Value Date/Time    CALCIUM 10.3 12/18/2024 1459    ALKPHOS 122 12/18/2024 1459    AST 24 12/18/2024 1459    ALT 31 12/18/2024 1459    BILITOT 0.4 12/18/2024 1459    ESTGFRAFRICA >60 06/23/2022 0858    EGFRNONAA >60 06/23/2022 0858            Lab Results   Component Value Date    TSH 6.243 (H) 11/01/2017              Results for orders placed or performed in visit on 09/26/13   Vitamin D    Collection Time: 09/26/13 11:20 AM   Result Value Ref Range    Vit D, 25-Hydroxy 24 (L) 30 - 96 ng/mL     No results found for this or any previous visit.    Lab Results   Component Value Date    MICALBCREAT 63.6 (H) 01/27/2025       Hemoglobin A1C   Date Value Ref Range Status   01/27/2025 7.4 (H) 4.0 - 5.6 % Final     Comment:     ADA Screening Guidelines:  5.7-6.4%  Consistent with prediabetes  >or=6.5%  Consistent with diabetes    High levels of fetal hemoglobin interfere with the HbA1C  assay. Heterozygous hemoglobin variants (HbS, HgC, etc)do  not significantly interfere with this assay.   However, presence of multiple variants may affect accuracy.     09/09/2024 8.4 (H) 4.0 - 5.6 % Final     Comment:     ADA Screening Guidelines:  5.7-6.4%  Consistent with prediabetes  >or=6.5%  Consistent with diabetes    High levels of fetal hemoglobin interfere with the HbA1C  assay. Heterozygous hemoglobin variants (HbS, HgC, etc)do  not significantly interfere with this assay.   However, presence of multiple variants may affect accuracy.     08/22/2024 9.0 (H) 4.8 - 6.0 % Final "   09/26/2013 5.9 4.5 - 6.2 % Final        ASSESSMENT and PLAN:      1. T2DM with hyperglycemia,    She would like to resume Golo  Check bg qd, stagger times    2. HTN - uncontrolled, continue meds as previously prescribed and monitor. F/u w PCP    3. HLP - on statin therapy     4. CAD- following w Dr Castillo    5. Obesity - Body mass index is 33.95 kg/m². Encouraged continued exercise, possible w 10-15% reduction in weight may help decrease insulin resistance        Follow-up: in 3 months with A1C, UMCR, lipid, TSH, CMP

## 2025-02-27 ENCOUNTER — OFFICE VISIT (OUTPATIENT)
Facility: CLINIC | Age: 75
End: 2025-02-27
Payer: MEDICARE

## 2025-02-27 ENCOUNTER — TELEPHONE (OUTPATIENT)
Dept: SURGERY | Facility: HOSPITAL | Age: 75
End: 2025-02-27
Payer: MEDICARE

## 2025-02-27 VITALS — HEIGHT: 59 IN | WEIGHT: 168 LBS | BODY MASS INDEX: 33.87 KG/M2

## 2025-02-27 DIAGNOSIS — E78.00 HYPERCHOLESTEROLEMIA: ICD-10-CM

## 2025-02-27 DIAGNOSIS — I10 ESSENTIAL HYPERTENSION: ICD-10-CM

## 2025-02-27 DIAGNOSIS — R74.01 ELEVATED ALT MEASUREMENT: ICD-10-CM

## 2025-02-27 DIAGNOSIS — E66.811 CLASS 1 OBESITY WITH BODY MASS INDEX (BMI) OF 33.0 TO 33.9 IN ADULT, UNSPECIFIED OBESITY TYPE, UNSPECIFIED WHETHER SERIOUS COMORBIDITY PRESENT: ICD-10-CM

## 2025-02-27 DIAGNOSIS — K76.0 METABOLIC DYSFUNCTION-ASSOCIATED STEATOTIC LIVER DISEASE (MASLD): Primary | ICD-10-CM

## 2025-02-27 DIAGNOSIS — E11.65 TYPE 2 DIABETES MELLITUS WITH HYPERGLYCEMIA, WITHOUT LONG-TERM CURRENT USE OF INSULIN: ICD-10-CM

## 2025-02-27 PROCEDURE — 99999 PR PBB SHADOW E&M-EST. PATIENT-LVL IV: CPT | Mod: PBBFAC,,, | Performed by: NURSE PRACTITIONER

## 2025-02-27 PROCEDURE — 3288F FALL RISK ASSESSMENT DOCD: CPT | Mod: CPTII,S$GLB,, | Performed by: NURSE PRACTITIONER

## 2025-02-27 PROCEDURE — 3072F LOW RISK FOR RETINOPATHY: CPT | Mod: CPTII,S$GLB,, | Performed by: NURSE PRACTITIONER

## 2025-02-27 PROCEDURE — 1125F AMNT PAIN NOTED PAIN PRSNT: CPT | Mod: CPTII,S$GLB,, | Performed by: NURSE PRACTITIONER

## 2025-02-27 PROCEDURE — 1160F RVW MEDS BY RX/DR IN RCRD: CPT | Mod: CPTII,S$GLB,, | Performed by: NURSE PRACTITIONER

## 2025-02-27 PROCEDURE — 1101F PT FALLS ASSESS-DOCD LE1/YR: CPT | Mod: CPTII,S$GLB,, | Performed by: NURSE PRACTITIONER

## 2025-02-27 PROCEDURE — 1159F MED LIST DOCD IN RCRD: CPT | Mod: CPTII,S$GLB,, | Performed by: NURSE PRACTITIONER

## 2025-02-27 PROCEDURE — 99204 OFFICE O/P NEW MOD 45 MIN: CPT | Mod: S$GLB,,, | Performed by: NURSE PRACTITIONER

## 2025-02-27 PROCEDURE — 3051F HG A1C>EQUAL 7.0%<8.0%: CPT | Mod: CPTII,S$GLB,, | Performed by: NURSE PRACTITIONER

## 2025-02-27 NOTE — TELEPHONE ENCOUNTER
Spoke with patient who states she forgot about procedure and that it was scheduled months ago, but she has other things going on and her family is having surgery. Please contact patient to reschedule for a better date. She will need prep instructions when she reschedules as well. Thank you!

## 2025-02-27 NOTE — PROGRESS NOTES
[facility] - New Patient     REFERRING PROVIDER: Lesvia Azevedo  PCP: No, Primary Doctor    Chief Complaint: Fatty liver      HISTORY     This is a 74 y.o. female referred for evaluation of above.    Hepatic steatosis noted on recent CT (12/3/24). Liver normal size, no liver lesions or biliary dilation. Spleen WNL. No imaging findings to suggest advanced liver disease.     Review of labs:  - Transaminases: mild intermittent elevations over the years, 30-40s  - Alk phos: mild elevations though currently WNL  - Synthetic liver function: WNL  - Platelets: WNL    Workup thus far:  Serologies:   Lab Results   Component Value Date    ANASCREEN Negative <1:80 12/18/2024    HEPBSAG Non-reactive 12/18/2024    HEPCAB Non-reactive 12/18/2024     08/07/2024      Risk factors for MASLD:   Weight -- Body mass index is 33.93 kg/m².     She has lost ~10 lb over the last few years    Had severe side effects with ozempic                  Dyslipidemia -- yes, on atorvastatin though not taking regularly due to myalgias   Hypertension -- yes                               Insulin resistance / diabetes -- yes  Lab Results   Component Value Date    HGBA1C 7.4 (H) 01/27/2025         Other -- CAD w/ stents, hypothyroid    Alcohol use: very seldom    Denies illicit drug use.     Family history:  No family history of liver disease, cirrhosis, or liver cancer.  +cardiac disease    Meds/supplements:  -Rx: none concerning from liver standpoint  -OTC, herbs/vitamins/supplements: none concerning from liver standpoint    Denies recent illness or infection.    No symptoms of hepatic decompensation including jaundice, ascites, cognitive problems to suggest hepatic encephalopathy, or GI bleeding.            Past medical history, surgical history, problem list, family history, social history, allergies: Reviewed and updated in the appropriate section of the electronic medical record.      Current Medications[1]  Medication  "list reviewed and updated.      Review of Systems - as per HPI  Constitutional: Negative for unexpected weight change.   Respiratory: Negative for shortness of breath.    Cardiovascular: Negative for leg swelling.  Gastrointestinal: Negative for abdominal distention or abdominal pain. Negative for melena or hematemesis.  Musculoskeletal: Negative for myalgias.    Skin: Negative for jaundice or itching.  Neurological: Negative for confusion or slowed mentation. Negative for tremors.   Hematological: Does not bruise/bleed easily.       Physical Exam   Constitutional: No distress. Alert and oriented.  Eyes: No scleral icterus.   Pulmonary/Chest: Respiratory effort normal. No respiratory distress.   Abdominal: No distension, no ascites appreciated.   Extremities: No edema.   Neurological: No tremor.   Skin: No jaundice.   Psychiatric: Normal mood and affect. Speech, behavior, and thought content normal.       LABS & DIAGNOSTIC STUDIES     I have personally reviewed pertinent laboratory findings:    Lab Results   Component Value Date    ALT 31 12/18/2024    AST 24 12/18/2024    ALKPHOS 122 12/18/2024    BILITOT 0.4 12/18/2024    ALBUMIN 4.3 12/18/2024       Lab Results   Component Value Date    WBC 8.14 12/18/2024    HGB 13.9 12/18/2024    HCT 43.9 12/18/2024    MCV 92 12/18/2024     12/18/2024       Lab Results   Component Value Date     12/18/2024    K 3.9 12/18/2024    BUN 11 12/18/2024    CREATININE 0.9 12/18/2024    ESTGFRAFRICA >60 06/23/2022    EGFRNONAA >60 06/23/2022       Lab Results   Component Value Date    ANASCREEN Negative <1:80 12/18/2024     08/07/2024    HEPBSAG Non-reactive 12/18/2024    HEPCAB Non-reactive 12/18/2024       No results found for: "AFP"      I have personally reviewed the following result reports:  CT - 12/3/24  EGD - 7/27/20      ASSESSMENT & PLAN     74 y.o. female with:    1. Metabolic dysfunction-associated steatotic liver disease (MASLD)    2. Elevated ALT " measurement    3. Hypercholesterolemia    4. Essential hypertension    5. Class 1 obesity with body mass index (BMI) of 33.0 to 33.9 in adult, unspecified obesity type, unspecified whether serious comorbidity present    6. Type 2 diabetes mellitus with hyperglycemia, without long-term current use of insulin          FIB-4 Calculation: 1.22 at 12/18/2024  2:59 PM    FIB-4 below 1.30 is considered as low-risk for advanced fibrosis  FIB-4 over 2.67 is considered as high-risk for advanced fibrosis  FIB-4 values between 1.30 and 2.67 are considered as intermediate-risk of advanced fibrosis for ages 36-64.   For ages > 64 the cut-off for low-risk goes to < 2.      Recommendations:   Obtain Fibroscan for fibrosis staging.  Recommend weight loss goal of 7-10% (about 15 lb).   Mediterranean diet - diet should be low in carbohydrates, added sugars, and processed foods. Recommend increasing protein and fiber intake.   150 min/week of moderate exercise (aerobic + resistance), as tolerated.  Maintain good control of blood pressure, cholesterol, and blood sugar. Commended on improvement in HgbA1c.   Check iron studies      Orders Placed This Encounter   Procedures    FibroScan Transplant Hepatology(Vibration Controlled Transient Elastography)    Ferritin    Iron and TIBC       Return to clinic pending results.       Thank you for allowing me to participate in the care of SHANNAN Godwin-[facility]  Hepatology        Duration of encounter: 45 min  This includes face to face time and non-face to face time preparing to see the patient (eg, review of tests), obtaining and/or reviewing separately obtained history, documenting clinical information in the electronic or other health record, independently interpreting results and communicating results to the patient/family/caregiver, or Care coordination.         [1]   Current Outpatient Medications   Medication Sig Dispense Refill    aluminum & magnesium  hydroxide-simethicone (MYLANTA MAX STRENGTH) 400-400-40 mg/5 mL suspension Take by mouth every 6 (six) hours as needed for Indigestion.      amLODIPine (NORVASC) 5 MG tablet Take 5 mg by mouth.      aspirin (ECOTRIN) 81 MG EC tablet Take 81 mg by mouth.      atorvastatin (LIPITOR) 20 MG tablet Take 20 mg by mouth once daily.      augmented betamethasone (DIPROLENE) 0.05 % gel FCO THIN FILM AA 4 TO 6 TIMES D. DO NOT USE FOR LONGER THAN 14 DAYS  1    betamethasone valerate 0.1% (VALISONE) 0.1 % Crea Apply 1 application topically daily as needed.       calcium carbonate (CALCIUM ANTACID) 300 mg (750 mg) Chew Take by mouth daily as needed.      cholecalciferol, vitamin D3, (VITAMIN D3) 50 mcg (2,000 unit) Tab Take 1 tablet by mouth once daily.       clobetasoL (TEMOVATE) 0.05 % cream APPLY 1/2 GRAM TOPICALLY twice weekly 30 g 1    conjugated estrogens (PREMARIN) vaginal cream Place 1.5 g vaginally twice a week. 42.5 g 6    docusate sodium (COLACE) 100 MG capsule Take 100 mg by mouth once daily.      estradioL (ESTRACE) 0.01 % (0.1 mg/gram) vaginal cream Place 2 g vaginally twice a week. 42.5 g 3    FLOVENT  mcg/actuation inhaler INHALE 1 PUFF PO Q 12 H  4    fluticasone (FLONASE) 50 mcg/actuation nasal spray 1 spray by Each Nare route 2 (two) times daily as needed.       ibuprofen (ADVIL,MOTRIN) 100 MG tablet Take 100 mg by mouth every 6 (six) hours as needed for Pain.      levothyroxine (SYNTHROID) 25 MCG tablet Take 25 mcg by mouth once daily.       lorazepam (ATIVAN) 0.5 MG tablet Take 0.5 mg by mouth every 12 (twelve) hours as needed.      losartan (COZAAR) 50 MG tablet Take 50 mg by mouth 2 (two) times a day.      omeprazole (PRILOSEC) 40 MG capsule Take 1 capsule (40 mg total) by mouth before breakfast. 30 capsule 1    RESTASIS 0.05 % ophthalmic emulsion Place 1 drop into both eyes 2 (two) times daily. 60 each 11     No current facility-administered medications for this visit.

## 2025-02-28 ENCOUNTER — TELEPHONE (OUTPATIENT)
Facility: CLINIC | Age: 75
End: 2025-02-28

## 2025-02-28 ENCOUNTER — PATIENT MESSAGE (OUTPATIENT)
Facility: CLINIC | Age: 75
End: 2025-02-28

## 2025-02-28 ENCOUNTER — PROCEDURE VISIT (OUTPATIENT)
Facility: CLINIC | Age: 75
End: 2025-02-28
Payer: MEDICARE

## 2025-02-28 DIAGNOSIS — K76.0 METABOLIC DYSFUNCTION-ASSOCIATED STEATOTIC LIVER DISEASE (MASLD): ICD-10-CM

## 2025-02-28 NOTE — PROCEDURES
FibroScan Transplant Hepatology(Vibration Controlled Transient Elastography)    Date/Time: 2/28/2025 9:00 AM    Performed by: Sondra Muhammad NP  Authorized by: Sondra Muhammad NP    Diagnosis:  NAFLD    Probe:  XL    Universal Protocol: Patient's identity, procedure and site were verified, confirmatory pause was performed.  Discussed procedure including risks and potential complications.  Questions answered.  Patient verbalizes understanding and wishes to proceed with VCTE.     Procedure: After providing explanations of the procedure, patient was placed in the supine position with right arm in maximum abduction to allow optimal exposure of right lateral abdomen.  Patient was briefly assessed, Testing was performed in the mid-axillary location, 50Hz Shear Wave pulses were applied and the resulting Shear Wave and Propagation Speed detected with a 3.5 MHz ultrasonic signal, using the FibroScan probe, Skin to liver capsule distance and liver parenchyma were accessed during the entire examination with the FibroScan probe, Patient was instructed to breathe normally and to abstain from sudden movements during the procedure, allowing for random measurements of liver stiffness. At least 10 Shear Waves were produced, Individual measurements of each Shear Wave were calculated.  Patient tolerated the procedure well with no complications.  Meets discharge criteria as was dismissed.  Rates pain 0 out of 10.  Patient will follow up with ordering provider to review results.    Findings  Median liver stiffness score:  5.5  CAP Reading: dB/m:  391    IQR/med %:  5  Interpretation  Fibrosis interpretation is based on medial liver stiffness - Kilopascal (kPa).    Fibrosis Stage:  F 0-1  Steatosis interpretation is based on controlled attenuation parameter - (dB/m).    Steatosis Grade:  S3

## 2025-03-07 ENCOUNTER — TELEPHONE (OUTPATIENT)
Facility: CLINIC | Age: 75
End: 2025-03-07
Payer: MEDICARE

## 2025-03-07 NOTE — TELEPHONE ENCOUNTER
Linda Packer NP sent to [facility]  Please call pt and instruct her to read her [facility] message from Sondra that hasn't been read    Thanks       Patient was called and informed of providers message. Pt said that she received notification but just forgot to read it. Mrs Hope said she will read it when she get home.    Kendra

## 2025-05-09 DIAGNOSIS — L23.9 ALLERGIC CONTACT DERMATITIS, UNSPECIFIED TRIGGER: ICD-10-CM

## 2025-05-09 DIAGNOSIS — L90.0 LICHEN SCLEROSUS ET ATROPHICUS: ICD-10-CM

## 2025-05-09 RX ORDER — CLOBETASOL PROPIONATE 0.5 MG/G
CREAM TOPICAL
Qty: 30 G | Refills: 0 | Status: SHIPPED | OUTPATIENT
Start: 2025-05-09

## 2025-05-09 NOTE — TELEPHONE ENCOUNTER
Refill Decision Note   [employer] [employer]  is requesting a refill authorization.  Brief Assessment and Rationale for Refill:  Approve     Medication Therapy Plan:        Comments:     Note composed:3:59 PM 05/09/2025

## 2025-05-13 ENCOUNTER — TELEPHONE (OUTPATIENT)
Dept: SURGERY | Facility: HOSPITAL | Age: 75
End: 2025-05-13
Payer: MEDICARE

## 2025-05-13 NOTE — TELEPHONE ENCOUNTER
Spoke with patient who states she forgot about procedure and has other appointments that day, so she would like to reschedule. Please contact patient to reschedule procedure. Thanks!

## 2025-05-20 ENCOUNTER — LAB VISIT (OUTPATIENT)
Dept: LAB | Facility: HOSPITAL | Age: 75
End: 2025-05-20
Attending: NURSE PRACTITIONER
Payer: MEDICARE

## 2025-05-20 DIAGNOSIS — E11.65 TYPE 2 DIABETES MELLITUS WITH HYPERGLYCEMIA, WITHOUT LONG-TERM CURRENT USE OF INSULIN: ICD-10-CM

## 2025-05-20 LAB
ALBUMIN/CREAT UR: 33.3 UG/MG
CREAT UR-MCNC: 48 MG/DL (ref 15–325)
MICROALBUMIN UR-MCNC: 16 UG/ML (ref ?–5000)

## 2025-05-20 PROCEDURE — 82043 UR ALBUMIN QUANTITATIVE: CPT

## 2025-05-21 ENCOUNTER — TELEPHONE (OUTPATIENT)
Dept: ENDOCRINOLOGY | Facility: CLINIC | Age: 75
End: 2025-05-21
Payer: MEDICARE

## 2025-05-21 ENCOUNTER — RESULTS FOLLOW-UP (OUTPATIENT)
Dept: ENDOCRINOLOGY | Facility: CLINIC | Age: 75
End: 2025-05-21

## 2025-05-22 ENCOUNTER — OFFICE VISIT (OUTPATIENT)
Dept: ENDOCRINOLOGY | Facility: CLINIC | Age: 75
End: 2025-05-22
Payer: MEDICARE

## 2025-05-22 VITALS
WEIGHT: 169.75 LBS | HEART RATE: 63 BPM | SYSTOLIC BLOOD PRESSURE: 122 MMHG | BODY MASS INDEX: 34.22 KG/M2 | DIASTOLIC BLOOD PRESSURE: 62 MMHG | HEIGHT: 59 IN | OXYGEN SATURATION: 96 %

## 2025-05-22 DIAGNOSIS — I25.10 CORONARY ARTERY DISEASE DUE TO CALCIFIED CORONARY LESION: ICD-10-CM

## 2025-05-22 DIAGNOSIS — E66.811 CLASS 1 OBESITY WITH BODY MASS INDEX (BMI) OF 33.0 TO 33.9 IN ADULT, UNSPECIFIED OBESITY TYPE, UNSPECIFIED WHETHER SERIOUS COMORBIDITY PRESENT: ICD-10-CM

## 2025-05-22 DIAGNOSIS — E03.9 ACQUIRED HYPOTHYROIDISM: ICD-10-CM

## 2025-05-22 DIAGNOSIS — I10 ESSENTIAL HYPERTENSION: ICD-10-CM

## 2025-05-22 DIAGNOSIS — E11.65 TYPE 2 DIABETES MELLITUS WITH HYPERGLYCEMIA, WITHOUT LONG-TERM CURRENT USE OF INSULIN: Primary | ICD-10-CM

## 2025-05-22 DIAGNOSIS — E78.00 HYPERCHOLESTEROLEMIA: ICD-10-CM

## 2025-05-22 DIAGNOSIS — I25.84 CORONARY ARTERY DISEASE DUE TO CALCIFIED CORONARY LESION: ICD-10-CM

## 2025-05-22 PROCEDURE — 3051F HG A1C>EQUAL 7.0%<8.0%: CPT | Mod: CPTII,S$GLB,, | Performed by: NURSE PRACTITIONER

## 2025-05-22 PROCEDURE — 99214 OFFICE O/P EST MOD 30 MIN: CPT | Mod: S$GLB,,, | Performed by: NURSE PRACTITIONER

## 2025-05-22 PROCEDURE — G2211 COMPLEX E/M VISIT ADD ON: HCPCS | Mod: S$GLB,,, | Performed by: NURSE PRACTITIONER

## 2025-05-22 PROCEDURE — 1101F PT FALLS ASSESS-DOCD LE1/YR: CPT | Mod: CPTII,S$GLB,, | Performed by: NURSE PRACTITIONER

## 2025-05-22 PROCEDURE — 3288F FALL RISK ASSESSMENT DOCD: CPT | Mod: CPTII,S$GLB,, | Performed by: NURSE PRACTITIONER

## 2025-05-22 PROCEDURE — 3072F LOW RISK FOR RETINOPATHY: CPT | Mod: CPTII,S$GLB,, | Performed by: NURSE PRACTITIONER

## 2025-05-22 PROCEDURE — 3074F SYST BP LT 130 MM HG: CPT | Mod: CPTII,S$GLB,, | Performed by: NURSE PRACTITIONER

## 2025-05-22 PROCEDURE — 1125F AMNT PAIN NOTED PAIN PRSNT: CPT | Mod: CPTII,S$GLB,, | Performed by: NURSE PRACTITIONER

## 2025-05-22 PROCEDURE — 3078F DIAST BP <80 MM HG: CPT | Mod: CPTII,S$GLB,, | Performed by: NURSE PRACTITIONER

## 2025-05-22 PROCEDURE — 99999 PR PBB SHADOW E&M-EST. PATIENT-LVL IV: CPT | Mod: PBBFAC,,, | Performed by: NURSE PRACTITIONER

## 2025-05-22 PROCEDURE — 1159F MED LIST DOCD IN RCRD: CPT | Mod: CPTII,S$GLB,, | Performed by: NURSE PRACTITIONER

## 2025-05-22 RX ORDER — LEVOTHYROXINE SODIUM 50 UG/1
50 TABLET ORAL
Qty: 30 TABLET | Refills: 11 | Status: SHIPPED | OUTPATIENT
Start: 2025-05-22 | End: 2026-05-22

## 2025-05-22 RX ORDER — OXYCODONE AND ACETAMINOPHEN 5; 325 MG/1; MG/1
1 TABLET ORAL EVERY 6 HOURS PRN
COMMUNITY

## 2025-05-22 RX ORDER — TIZANIDINE 2 MG/1
2-4 TABLET ORAL NIGHTLY PRN
COMMUNITY

## 2025-05-22 RX ORDER — TRAMADOL HYDROCHLORIDE 50 MG/1
TABLET, FILM COATED ORAL
COMMUNITY
Start: 2024-12-02

## 2025-05-22 NOTE — PROGRESS NOTES
CC: This 74 y.o. female presents for management of diabetes  and chronic conditions pending review including HTN, HLP, CAD, obesity    HPI: She was diagnosed with T2DM in ~ 2019.  Has never been hospitalized r/t DM.  Family hx of DM: PGM     A1C is up since last visit, still on no DM meds  She tried Golo but did not agree w her so stopped   She is post neck sx 4/2025  hypoglycemia at home- no symptoms  monitoring BG at home:   Checks bg a few times a week fasting  Last check was 151 and mosttly 150s when she checks    Diet: Eats 3 Meals a day, snacks- cameral popcorn   Eating out for lunch most days of the week    Exercise: none  CURRENT DM MEDS: none  Previous meds- jardiance   Ozempic- GI issues  Glucometer type: True Metrix     Standards of Care:  Eye exam: Dr Gordon~2/2025    Regarding Hypothyroidism   Taking LT4 25 mcg daily  Not taking biotin.   No tremors of the hands  No intolerance to the heat or cold  No hair loss    Retired from [employer]     ROS:   Gen: Appetite good, weight stable   Eyes: Denies visual disturbances  Resp: no SOB or GREENE   Cardiac: no chest pain    GI: No nausea or vomiting,   /GYN: 1+ nocturia, no burning or pain.   MS/Neuro: speech clear  Psych: Denies drug/ETOH abuse, + anxiety.  Other systems: negative.    PE:  GENERAL: Well developed, well nourished.  PSYCH: AAOx3, appropriate mood and affect, pleasant expression, conversant, appears relaxed, well groomed.   EYES: Conjunctiva, corneas clear  NECK: Supple, trachea midline   SKIN:   no acanthosis nigracans.  FOOT EXAMINATION: 10/7/2024  No foot deformity, corns or callus formation, Onychomycosis, nails in good condition and well trimmed, no interspace maceration or ulceration noted.  Decreased hair growth present over toes/feet.  Protective sensation intact with 10 gram monofilament.  +2 dorsalis pedis and posterior pulses noted.     Personally reviewed Past Medical, Surgical, Social History.    /62 (BP Location: Right arm,  "Patient Position: Sitting)   Pulse 63   Ht 4' 11" (1.499 m)   Wt 77 kg (169 lb 12.1 oz)   LMP 10/07/1976   SpO2 96%   BMI 34.29 kg/m²      Personally reviewed the below labs:      Chemistry        Component Value Date/Time     05/20/2025 0930     12/18/2024 1459    K 4.3 05/20/2025 0930    K 3.9 12/18/2024 1459     05/20/2025 0930     12/18/2024 1459    CO2 26 05/20/2025 0930    CO2 28 12/18/2024 1459    BUN 12 05/20/2025 0930    CREATININE 0.7 05/20/2025 0930     (H) 05/20/2025 0930     (H) 12/18/2024 1459        Component Value Date/Time    CALCIUM 9.7 05/20/2025 0930    CALCIUM 10.3 12/18/2024 1459    ALKPHOS 130 05/20/2025 0930    ALKPHOS 122 12/18/2024 1459    AST 18 05/20/2025 0930    AST 24 12/18/2024 1459    ALT 24 05/20/2025 0930    ALT 31 12/18/2024 1459    BILITOT 0.4 05/20/2025 0930    BILITOT 0.4 12/18/2024 1459    ESTGFRAFRICA >60 06/23/2022 0858    EGFRNONAA >60 06/23/2022 0858            Lab Results   Component Value Date    TSH 4.509 (H) 05/20/2025       Recent Labs   Lab 05/20/25  0930   LDL Cholesterol 76.8   HDL Cholesterol 49   Cholesterol Total 172        Results for orders placed or performed in visit on 09/26/13   Vitamin D    Collection Time: 09/26/13 11:20 AM   Result Value Ref Range    Vit D, 25-Hydroxy 24 (L) 30 - 96 ng/mL     No results found for this or any previous visit.    Lab Results   Component Value Date    MICALBCREAT 33.3 (H) 05/20/2025       Hemoglobin A1C   Date Value Ref Range Status   01/27/2025 7.4 (H) 4.0 - 5.6 % Final     Comment:     ADA Screening Guidelines:  5.7-6.4%  Consistent with prediabetes  >or=6.5%  Consistent with diabetes    High levels of fetal hemoglobin interfere with the HbA1C  assay. Heterozygous hemoglobin variants (HbS, HgC, etc)do  not significantly interfere with this assay.   However, presence of multiple variants may affect accuracy.     09/09/2024 8.4 (H) 4.0 - 5.6 % Final     Comment:     ADA Screening " Guidelines:  5.7-6.4%  Consistent with prediabetes  >or=6.5%  Consistent with diabetes    High levels of fetal hemoglobin interfere with the HbA1C  assay. Heterozygous hemoglobin variants (HbS, HgC, etc)do  not significantly interfere with this assay.   However, presence of multiple variants may affect accuracy.     08/22/2024 9.0 (H) 4.8 - 6.0 % Final   09/26/2013 5.9 4.5 - 6.2 % Final     Hemoglobin A1c   Date Value Ref Range Status   05/20/2025 7.7 (H) 4.0 - 5.6 % Final     Comment:     ADA Screening Guidelines:  5.7-6.4%  Consistent with prediabetes  >=6.5%  Consistent with diabetes    High levels of fetal hemoglobin interfere with the HbA1C  assay. Heterozygous hemoglobin variants (HbS, HgC, etc)do  not significantly interfere with this assay.   However, presence of multiple variants may affect accuracy.        ASSESSMENT and PLAN:      1. T2DM with hyperglycemia,    Discussed increased A1C  Discussed med options metformin and mounjaro, declines  Is agreeable to attend dm edu for diet  Check bg qd, stagger times    2. HTN - uncontrolled, continue meds as previously prescribed and monitor. F/u w PCP    3. HLP - on statin therapy     4. CAD- following w Dr Castillo    5. Obesity - Body mass index is 34.29 kg/m². Encouraged  , possible w 10-15% reduction in weight may help decrease insulin resistance     6. Hypothyroidism- TSH above goal, no missed doses, increase LT4 to 50 mcg qd, recheck tsh in 6 weeks     Follow-up: in 3 months with A1C, TSH

## 2025-06-05 ENCOUNTER — CLINICAL SUPPORT (OUTPATIENT)
Dept: DIABETES | Facility: CLINIC | Age: 75
End: 2025-06-05
Payer: MEDICARE

## 2025-06-05 VITALS — WEIGHT: 169.75 LBS | HEIGHT: 59 IN | BODY MASS INDEX: 34.22 KG/M2

## 2025-06-05 DIAGNOSIS — E11.65 TYPE 2 DIABETES MELLITUS WITH HYPERGLYCEMIA, WITHOUT LONG-TERM CURRENT USE OF INSULIN: ICD-10-CM

## 2025-06-05 PROCEDURE — 99999 PR PBB SHADOW E&M-EST. PATIENT-LVL I: CPT | Mod: PBBFAC,,, | Performed by: DIETITIAN, REGISTERED

## 2025-06-05 PROCEDURE — G0108 DIAB MANAGE TRN  PER INDIV: HCPCS | Mod: S$GLB,,, | Performed by: DIETITIAN, REGISTERED

## 2025-06-19 ENCOUNTER — TELEPHONE (OUTPATIENT)
Dept: GASTROENTEROLOGY | Facility: CLINIC | Age: 75
End: 2025-06-19
Payer: MEDICARE

## 2025-06-19 NOTE — TELEPHONE ENCOUNTER
Called pt to notify, no answer, left detailed vm with callback #   [facility]          Chart reviewed, no opportunity found: CHART REVIEWED, NO OPPORTUNITY FOUND                     Patients insurance company:  [employer] [facility] (Medicare Advantage and Commercial)

## 2025-06-19 NOTE — TELEPHONE ENCOUNTER
Copied from CRM #[id number]. Topic: General Inquiry - Patient Advice  >> Jun 19, 2025  8:30 AM Sang wrote:  Type:  Needs Medical Advice    Who Called: Pt  Would the patient rather a call back or a response via [facility]? Call  Best Call Back Number: [phone]   Additional Information: Pt adv she is having trouble going to the bathroom. Pls call back and adv. Thank you.

## 2025-06-19 NOTE — TELEPHONE ENCOUNTER
Returned pt call, pt state she is having some issues with passing stool. Its either diarrhea or getting stuck. Pt state she had a past procedure that was unsuccessful and asked if we had providers to do hemorrhoid surgery- gave pt Dr Noel name but also informed pt she will need to be referred to that provider. Pt asked if meds can be sent over until she is seen, informed pt she will have to be seen in clinic for eval and proper Tx before ordering anything. Pt asked if there is something that can be taken or done until she gets in. Informed pt Jelena is out but other providers are assisting her pt's, pt has next avail. Appt slot at this time (added to wait list) but msg will be routed to providers in clinic to see what they advise then we will contact pt once we have a response    For worsening symptoms pt advised to go to nearest ER/urgent care

## 2025-06-19 NOTE — TELEPHONE ENCOUNTER
High fiber diet with adequate water/fluid intake.   Miralax daily.   F/U office with NP for review.

## 2025-07-07 ENCOUNTER — LAB VISIT (OUTPATIENT)
Dept: LAB | Facility: HOSPITAL | Age: 75
End: 2025-07-07
Attending: NURSE PRACTITIONER
Payer: MEDICARE

## 2025-07-07 DIAGNOSIS — E03.9 ACQUIRED HYPOTHYROIDISM: ICD-10-CM

## 2025-07-07 LAB — TSH SERPL-ACNC: 1.8 UIU/ML (ref 0.4–4)

## 2025-07-07 PROCEDURE — 84443 ASSAY THYROID STIM HORMONE: CPT

## 2025-07-07 PROCEDURE — 36415 COLL VENOUS BLD VENIPUNCTURE: CPT | Mod: PO

## 2025-07-11 ENCOUNTER — OFFICE VISIT (OUTPATIENT)
Dept: GASTROENTEROLOGY | Facility: CLINIC | Age: 75
End: 2025-07-11
Payer: MEDICARE

## 2025-07-11 VITALS — WEIGHT: 167.31 LBS | BODY MASS INDEX: 33.73 KG/M2 | HEIGHT: 59 IN

## 2025-07-11 DIAGNOSIS — K59.09 CHRONIC CONSTIPATION: Primary | ICD-10-CM

## 2025-07-11 DIAGNOSIS — N81.6 RECTOCELE: ICD-10-CM

## 2025-07-11 DIAGNOSIS — K62.89 RECTAL PAIN: ICD-10-CM

## 2025-07-11 DIAGNOSIS — R12 HEARTBURN: ICD-10-CM

## 2025-07-11 DIAGNOSIS — R52 GENERALIZED BODY ACHES: ICD-10-CM

## 2025-07-11 DIAGNOSIS — K92.1 HEMATOCHEZIA: ICD-10-CM

## 2025-07-11 DIAGNOSIS — Z79.02 ANTIPLATELET OR ANTITHROMBOTIC LONG-TERM USE: ICD-10-CM

## 2025-07-11 DIAGNOSIS — R10.10 UPPER ABDOMINAL PAIN: ICD-10-CM

## 2025-07-11 DIAGNOSIS — Z87.19 HISTORY OF HEMORRHOIDS: ICD-10-CM

## 2025-07-11 DIAGNOSIS — Z86.0100 HISTORY OF COLON POLYPS: ICD-10-CM

## 2025-07-11 PROBLEM — M50.20 DISPLACEMENT OF CERVICAL INTERVERTEBRAL DISC: Status: ACTIVE | Noted: 2025-02-26

## 2025-07-11 PROBLEM — M25.50 ARTHRALGIA OF MULTIPLE JOINTS: Status: ACTIVE | Noted: 2024-12-09

## 2025-07-11 PROBLEM — M54.2 NECK PAIN: Status: ACTIVE | Noted: 2024-12-09

## 2025-07-11 PROBLEM — M19.90 OSTEOARTHRITIS: Status: ACTIVE | Noted: 2025-01-14

## 2025-07-11 PROBLEM — M75.01 ADHESIVE CAPSULITIS OF RIGHT SHOULDER: Status: ACTIVE | Noted: 2024-12-09

## 2025-07-11 PROBLEM — M17.12 PRIMARY OSTEOARTHRITIS OF LEFT KNEE: Status: ACTIVE | Noted: 2017-07-14

## 2025-07-11 PROBLEM — M17.11 OSTEOARTHRITIS OF RIGHT KNEE: Status: ACTIVE | Noted: 2019-01-10

## 2025-07-11 PROBLEM — E11.9 TYPE 2 DIABETES MELLITUS WITHOUT COMPLICATION, WITH NO HISTORY OF INSULIN USE: Status: ACTIVE | Noted: 2023-07-06

## 2025-07-11 PROBLEM — M47.817 LUMBOSACRAL SPONDYLOSIS WITHOUT MYELOPATHY: Status: ACTIVE | Noted: 2019-08-28

## 2025-07-11 PROBLEM — S83.207A ACUTE MENISCAL TEAR OF LEFT KNEE: Status: ACTIVE | Noted: 2017-07-14

## 2025-07-11 PROBLEM — G44.84 BENIGN EXERTIONAL HEADACHE: Status: ACTIVE | Noted: 2025-07-11

## 2025-07-11 PROBLEM — G56.00 CARPAL TUNNEL SYNDROME: Status: ACTIVE | Noted: 2025-07-11

## 2025-07-11 PROBLEM — M79.10 MUSCLE PAIN: Status: ACTIVE | Noted: 2025-04-21

## 2025-07-11 PROBLEM — M25.561 ARTHRALGIA OF BOTH KNEES: Status: ACTIVE | Noted: 2024-12-09

## 2025-07-11 PROBLEM — S76.309A HAMSTRING INJURY: Status: ACTIVE | Noted: 2025-01-15

## 2025-07-11 PROBLEM — M25.551 PAIN OF RIGHT HIP JOINT: Status: ACTIVE | Noted: 2024-12-01

## 2025-07-11 PROBLEM — M25.562 ARTHRALGIA OF BOTH KNEES: Status: ACTIVE | Noted: 2024-12-09

## 2025-07-11 PROBLEM — M25.522 BILATERAL ELBOW JOINT PAIN: Status: ACTIVE | Noted: 2024-12-09

## 2025-07-11 PROBLEM — M54.10 RADICULAR PAIN OF RIGHT LOWER EXTREMITY: Status: ACTIVE | Noted: 2019-02-19

## 2025-07-11 PROBLEM — G89.4 CHRONIC PAIN SYNDROME: Status: ACTIVE | Noted: 2024-12-08

## 2025-07-11 PROBLEM — M54.17 LUMBOSACRAL RADICULITIS: Status: ACTIVE | Noted: 2019-06-21

## 2025-07-11 PROBLEM — M25.559 GREATER TROCHANTERIC PAIN SYNDROME: Status: ACTIVE | Noted: 2025-01-15

## 2025-07-11 PROBLEM — F41.9 ANXIETY: Status: ACTIVE | Noted: 2025-01-07

## 2025-07-11 PROBLEM — M79.18 MYOFASCIAL PAIN: Status: ACTIVE | Noted: 2025-02-26

## 2025-07-11 PROBLEM — M12.811 ROTATOR CUFF ARTHROPATHY OF RIGHT SHOULDER: Status: ACTIVE | Noted: 2024-12-09

## 2025-07-11 PROBLEM — Z96.651 HISTORY OF ARTHROPLASTY OF RIGHT KNEE: Status: ACTIVE | Noted: 2019-04-25

## 2025-07-11 PROBLEM — M54.12 CERVICAL RADICULITIS: Status: ACTIVE | Noted: 2024-12-01

## 2025-07-11 PROBLEM — M75.02 ADHESIVE CAPSULITIS OF LEFT SHOULDER: Status: ACTIVE | Noted: 2024-12-09

## 2025-07-11 PROBLEM — R00.2 PALPITATIONS: Status: ACTIVE | Noted: 2023-07-06

## 2025-07-11 PROBLEM — M25.521 BILATERAL ELBOW JOINT PAIN: Status: ACTIVE | Noted: 2024-12-09

## 2025-07-11 PROCEDURE — 3288F FALL RISK ASSESSMENT DOCD: CPT | Mod: CPTII,S$GLB,, | Performed by: NURSE PRACTITIONER

## 2025-07-11 PROCEDURE — 99215 OFFICE O/P EST HI 40 MIN: CPT | Mod: S$GLB,,, | Performed by: NURSE PRACTITIONER

## 2025-07-11 PROCEDURE — 99999 PR PBB SHADOW E&M-EST. PATIENT-LVL III: CPT | Mod: PBBFAC,,, | Performed by: NURSE PRACTITIONER

## 2025-07-11 PROCEDURE — 1159F MED LIST DOCD IN RCRD: CPT | Mod: CPTII,S$GLB,, | Performed by: NURSE PRACTITIONER

## 2025-07-11 PROCEDURE — 1126F AMNT PAIN NOTED NONE PRSNT: CPT | Mod: CPTII,S$GLB,, | Performed by: NURSE PRACTITIONER

## 2025-07-11 PROCEDURE — 1101F PT FALLS ASSESS-DOCD LE1/YR: CPT | Mod: CPTII,S$GLB,, | Performed by: NURSE PRACTITIONER

## 2025-07-11 PROCEDURE — 3051F HG A1C>EQUAL 7.0%<8.0%: CPT | Mod: CPTII,S$GLB,, | Performed by: NURSE PRACTITIONER

## 2025-07-11 PROCEDURE — 3072F LOW RISK FOR RETINOPATHY: CPT | Mod: CPTII,S$GLB,, | Performed by: NURSE PRACTITIONER

## 2025-07-11 RX ORDER — TIZANIDINE HYDROCHLORIDE 4 MG/1
4 CAPSULE, GELATIN COATED ORAL NIGHTLY PRN
COMMUNITY
Start: 2025-07-10

## 2025-07-11 RX ORDER — OMEPRAZOLE 40 MG/1
40 CAPSULE, DELAYED RELEASE ORAL
Qty: 30 CAPSULE | Refills: 1 | Status: SHIPPED | OUTPATIENT
Start: 2025-07-11

## 2025-07-11 NOTE — PROGRESS NOTES
Subjective:       Patient ID: Herminia Wooten is a 74 y.o. female Body mass index is 33.8 kg/m².    Chief Complaint: Constipation  Established patient of Dr. Luna (most recent) MARISA Nieves NP, hepatology JOSEPH Muhammad NP, & myself.    Patient did not have EGD or colonoscopy as previously recommended. Reports had to cancel scopes previously scheduled due to other health issues and her sister had cancer.    PRIOR HISTORY: Patient has been seeing Dr. Ortiz and rheumatology. Patient reports she has been having constant bodyaches since 7/2024, was on ozempic previously but she stopped it. Seeing back doctor, been getting procedures for it. Seeing a pain management.    GI Problem  The primary symptoms include abdominal pain and hematochezia (occasional of small amount of bright red blood on tissue; denies bleeding in between bowel movements). Primary symptoms do not include fever, weight loss (stable since our last visit), fatigue, nausea, vomiting, diarrhea, melena, hematemesis, jaundice or dysuria.   The abdominal pain began more than 2 days ago (~ early 5/2024 after overeating at [employer], saw her PCP and he started her on carafate (no longer taking)). The abdominal pain has been unchanged since its onset. The abdominal pain is located in the RUQ and epigastric region (described as mild; triggered after taking medication (ibuprofen or tylenol)). The abdominal pain radiates to the back. The severity of the abdominal pain is 0/10 (currently). Relieved by: worse with increased activity, mexican food.   The illness is also significant for bloating (occasional) and constipation (chronic for years; bowel movements are once a day to once every other day of formed stool currently with taking colace daily, senna PRN; straining with bowel movements and digital disimpaction at times). The illness does not include chills, dysphagia or odynophagia. Associated symptoms comments: PAST TREATMENT: suppository, enemas  help, dulcolax, linzess- doesn't remember taking it, Miralax, benefiber. Significant associated medical issues include GERD (taking prilosec 40 mg once daily PRN takes about twice a week, mylanta prn- taking every other day; PAST TREATMENTS: protonix, zantac, carafate, Tums), bowel resection (colectomy due to endometriosis & rectocele repair), irritable bowel syndrome (PAST TREATMENT: bentyl), hemorrhoids and diverticulitis. Associated medical issues do not include inflammatory bowel disease.     Review of Systems   Constitutional:  Negative for appetite change, chills, fatigue, fever and weight loss (stable since our last visit).        Reports frequent NSAID use of ibuprofen- takes every other day; percocet taken nightly   HENT:  Negative for sore throat and trouble swallowing.    Respiratory:  Negative for cough, choking and shortness of breath.    Cardiovascular:  Negative for chest pain.   Gastrointestinal:  Positive for abdominal pain, anal bleeding, bloating (occasional), constipation (chronic for years; bowel movements are once a day to once every other day of formed stool currently with taking colace daily, senna PRN; straining with bowel movements and digital disimpaction at times), hematochezia (occasional of small amount of bright red blood on tissue; denies bleeding in between bowel movements) and rectal pain (occasional, reports history of tear). Negative for diarrhea, dysphagia, hematemesis, jaundice, melena, nausea and vomiting.   Genitourinary:  Negative for difficulty urinating, dysuria and flank pain.        History of rectocele. Repaired in the past but reports it was unsuccessful.   Neurological:  Negative for weakness.       Patient's last menstrual period was 10/07/1976. s/p hysterectomy    Past Medical History:   Diagnosis Date    Anesthesia complication     elevated BP    Colon polyp     Coronary artery disease     Diabetes mellitus     Diverticulitis     Diverticulosis     Encounter for  blood transfusion     Fatty liver 04/25/2016    Gallbladder polyp     GERD (gastroesophageal reflux disease)     Hyperlipidemia     Hypertension     Hypothyroid     Irritable bowel syndrome     Kidney stone     Liver hemangioma     Partial bowel obstruction     Seasonal allergies     Thyroid disease      Past Surgical History:   Procedure Laterality Date    ABDOMINAL SURGERY      APPENDECTOMY      CARDIAC CATHETERIZATION      2 stents    CHOLECYSTECTOMY  04/04/2019    Dr. Ortiz: report and biopsy sent for scanning    COLECTOMY      endometriosis- 8.5 inches removed    COLONOSCOPY  2016    repeat in 5    COLONOSCOPY N/A 06/27/2016    Procedure: COLONOSCOPY;  Surgeon: Avel Luna Jr., MD;  Location: [facility];  Service: Endoscopy;  Laterality: N/A; repeat in 5 years for surveillance    COLONOSCOPY N/A 07/27/2020    Procedure: COLONOSCOPY;  Surgeon: Avel Luna Jr., MD;  Location: [facility];  Service: Endoscopy;  Laterality: N/A; Irritable bowel syndrome(?). hemorrhoids, 2 colon polyps removed, diverticulosis, repeat in 5 years for surveillance. biopsy: Hyperplastic polyp    CORONARY ANGIOPLASTY WITH STENT PLACEMENT      x 2    EGD - EXTERNAL RESULT  03/20/2019    Dr. Ortiz, sent for scanning: biopsy: random stomach- mild nonspecific inflammation, negative h pylori; GEJ- mild chroni inflammation, negative for merida's esophagus    EGD - EXTERNAL RESULT  08/17/2022    Dr. Ortiz, sent for scanning: reflux esophagitis, stricture at the lower end of esophagus- dilation performed, spasm at lower end of esophagus, acute on chronic gastritis, polyps of the stomach removed, and spasm of pylorus, small lipoma of stomach    ESOPHAGOGASTRODUODENOSCOPY N/A 07/27/2020    Procedure: EGD (ESOPHAGOGASTRODUODENOSCOPY);  Surgeon: Avel Luna Jr., MD;  Location: [facility];  Service: Endoscopy;  Laterality: N/A; unremarkable; biopsy: stomach- Mild chronic gastritis, negative for h pylori. duodenum WNL    HYSTERECTOMY       INCONTINENCE SURGERY      INJECTION OF ANESTHETIC AGENT INTO SACROILIAC JOINT Right 2019    Procedure: BLOCK, SACROILIAC JOINT;  Surgeon: Homer Diamond MD;  Location: [facility];  Service: Pain Management;  Laterality: Right;    INJECTION OF FACET JOINT Right 2019    Procedure: INJECTION, FACET JOINT;  Surgeon: Homer Diamond MD;  Location: [facility];  Service: Pain Management;  Laterality: Right;  L4 and L5    JOINT REPLACEMENT Right     Knee    OOPHORECTOMY      rectocele  2017    repair    Stent OM      UPPER GASTROINTESTINAL ENDOSCOPY  2013 Dr. Peters    reflux esophagitis; biopsy: negative dysplasia, intestinal metaplasia; mild chronic inflammation- GERD related & regenerative glandular epithelial change; strips of squamous esophageal mucosa with moderate basal epithelial cell hyperplasia and rare intraepithelial eosinophils (< 1 per 10 high power fields)    UPPER GASTROINTESTINAL ENDOSCOPY  2016    Dr. de la cruz    URETHRA SURGERY       Family History   Problem Relation Name Age of Onset    Breast cancer Sister      Clotting disorder Maternal Grandmother      Diverticulitis Maternal Grandfather      Diabetes Paternal Grandmother      Anesthesia problems Neg Hx      Ovarian cancer Neg Hx      Colon cancer Neg Hx      Colon polyps Neg Hx      Crohn's disease Neg Hx      Ulcerative colitis Neg Hx      Glaucoma Neg Hx      Macular degeneration Neg Hx      Cirrhosis Neg Hx       Social History     Tobacco Use    Smoking status: Former     Current packs/day: 0.00     Average packs/day: 0.3 packs/day for 35.0 years (8.8 ttl pk-yrs)     Types: Cigarettes     Start date: 10/7/1964     Quit date: 10/7/1999     Years since quittin.7    Smokeless tobacco: Never   Substance Use Topics    Alcohol use: No    Drug use: No     Wt Readings from Last 10 Encounters:   25 75.9 kg (167 lb 5.3 oz)   25 77 kg (169 lb 12.1 oz)   25 77 kg (169 lb 12.1 oz)   25 76.2 kg (168 lb)    02/04/25 76.2 kg (168 lb 1.6 oz)   11/13/24 75.7 kg (166 lb 14.2 oz)   10/10/24 74.6 kg (164 lb 7.4 oz)   10/07/24 74.6 kg (164 lb 7.4 oz)   08/01/24 76.5 kg (168 lb 10.4 oz)   05/21/24 76.6 kg (168 lb 14 oz)     Lab Results   Component Value Date    WBC 8.14 12/18/2024    HGB 13.9 12/18/2024    HCT 43.9 12/18/2024    MCV 92 12/18/2024     12/18/2024     CMP  Sodium   Date Value Ref Range Status   05/20/2025 140 136 - 145 mmol/L Final   12/18/2024 140 136 - 145 mmol/L Final     Potassium   Date Value Ref Range Status   05/20/2025 4.3 3.5 - 5.1 mmol/L Final   12/18/2024 3.9 3.5 - 5.1 mmol/L Final     Chloride   Date Value Ref Range Status   05/20/2025 103 95 - 110 mmol/L Final   12/18/2024 101 95 - 110 mmol/L Final     CO2   Date Value Ref Range Status   05/20/2025 26 23 - 29 mmol/L Final   12/18/2024 28 23 - 29 mmol/L Final     Glucose   Date Value Ref Range Status   05/20/2025 152 (H) 70 - 110 mg/dL Final   12/18/2024 225 (H) 70 - 110 mg/dL Final     BUN   Date Value Ref Range Status   05/20/2025 12 8 - 23 mg/dL Final     Creatinine   Date Value Ref Range Status   05/20/2025 0.7 0.5 - 1.4 mg/dL Final     Calcium   Date Value Ref Range Status   05/20/2025 9.7 8.7 - 10.5 mg/dL Final   12/18/2024 10.3 8.7 - 10.5 mg/dL Final     Protein Total   Date Value Ref Range Status   05/20/2025 6.7 6.0 - 8.4 gm/dL Final     Total Protein   Date Value Ref Range Status   12/18/2024 8.0 6.0 - 8.4 g/dL Final     Albumin   Date Value Ref Range Status   05/20/2025 3.8 3.5 - 5.2 g/dL Final   12/18/2024 4.3 3.5 - 5.2 g/dL Final     Total Bilirubin   Date Value Ref Range Status   12/18/2024 0.4 0.1 - 1.0 mg/dL Final     Comment:     For infants and newborns, interpretation of results should be based  on gestational age, weight and in agreement with clinical  observations.    Premature Infant recommended reference ranges:  Up to 24 hours.............<8.0 mg/dL  Up to 48 hours............<12.0 mg/dL  3-5  days..................<15.0 mg/dL  6-29 days.................<15.0 mg/dL       Bilirubin Total   Date Value Ref Range Status   05/20/2025 0.4 0.1 - 1.0 mg/dL Final     Comment:     For infants and newborns, interpretation of results should be based   on gestational age, weight and in agreement with clinical   observations.    Premature Infant recommended reference ranges:   0-24 hours:  <8.0 mg/dL   24-48 hours: <12.0 mg/dL   3-5 days:    <15.0 mg/dL   6-29 days:   <15.0 mg/dL     Alkaline Phosphatase   Date Value Ref Range Status   12/18/2024 122 40 - 150 U/L Final     ALP   Date Value Ref Range Status   05/20/2025 130 40 - 150 unit/L Final     AST   Date Value Ref Range Status   05/20/2025 18 11 - 45 unit/L Final   12/18/2024 24 10 - 40 U/L Final     ALT   Date Value Ref Range Status   05/20/2025 24 10 - 44 unit/L Final   12/18/2024 31 10 - 44 U/L Final     Anion Gap   Date Value Ref Range Status   05/20/2025 11 8 - 16 mmol/L Final     eGFR if    Date Value Ref Range Status   06/23/2022 >60 >60 mL/min/1.73 m^2 Final     eGFR if non    Date Value Ref Range Status   06/23/2022 >60 >60 mL/min/1.73 m^2 Final     Comment:     Calculation used to obtain the estimated glomerular filtration  rate (eGFR) is the CKD-EPI equation.        Lab Results   Component Value Date    OCCULTBLOOD Negative 10/17/2020     Lab Results   Component Value Date    LIPASE 32 12/03/2024     Lab Results   Component Value Date    LIPASERES 131 10/17/2020     Lab Results   Component Value Date    AMYLASE 82 04/13/2016     Lab Results   Component Value Date    TSH 1.803 07/07/2025     6/15/2021 stool studies reviewed (occasional yeast)    Reviewed prior medical records including radiology report of 12/3/2024 CT Abdomen Pelvis With IV Contrast Routine Oral Contrast; 11/17/2023 abdominal ultrasound; 3/13/2019 HIDA scan; 5/10/2017 MRI abdomen; 2/27/2025 hepatology visit note; & endoscopy history (see surgical  history).    Previously reviewed medical records received from Dr. Ortiz, summarized below and in medical & surgical history (endoscopies, etc), copies made & given to nurse to be scanned into system:   Visit notes: 4/1/2019, 4/15/2019, 6/8/2022, 6/30/2022, 7/13/2022, 8/1/2022, 12/15/2022, 2/16/2023  3/12/2019 discharge summary  Lab results (see records for full results): 6/9/2022 CMP with ALT 38 (H) otherwise LFTs WNL; CBC with H&H 15.2 & 48.3 (H),WBC WNL and platelet count WNL, TSH WNL, ESR WNL  Diagnoses: GERD, bowel adhesions, diverticulosis, partial bowel obstruction  EGDs: 3/20/19 & 8/17/22  Objective:      Physical Exam  Vitals and nursing note reviewed.   Constitutional:       General: She is not in acute distress.     Appearance: Normal appearance. She is well-developed. She is not diaphoretic.   HENT:      Mouth/Throat:      Lips: Pink. No lesions.      Mouth: Mucous membranes are moist. No oral lesions.      Tongue: No lesions.      Pharynx: Oropharynx is clear. No pharyngeal swelling or posterior oropharyngeal erythema.   Eyes:      General: No scleral icterus.     Conjunctiva/sclera: Conjunctivae normal.      Pupils: Pupils are equal, round, and reactive to light.   Pulmonary:      Effort: Pulmonary effort is normal. No respiratory distress.      Breath sounds: Normal breath sounds. No wheezing.   Abdominal:      General: Bowel sounds are normal. There is no distension or abdominal bruit.      Palpations: Abdomen is soft. Abdomen is not rigid. There is no mass.      Tenderness: There is abdominal tenderness (mild) in the epigastric area. There is no guarding or rebound. Negative signs include Leyva's sign and McBurney's sign.      Comments: Deferred rectal examination.   Skin:     General: Skin is warm and dry.      Coloration: Skin is not jaundiced or pale.      Findings: No erythema or rash.   Neurological:      Mental Status: She is alert and oriented to person, place, and time.   Psychiatric:          Behavior: Behavior normal.         Thought Content: Thought content normal.         Judgment: Judgment normal.         Assessment:       1. Chronic constipation    2. History of colon polyps    3. History of rectocele    4. History of hemorrhoids    5. Rectal pain    6. Hematochezia    7. Heartburn    8. Upper abdominal pain    9. Antiplatelet or antithrombotic long-term use    10. Generalized body aches          Plan:       Chronic constipation  -     Ambulatory referral/consult to Colorectal Surgery ([place]); Future; Expected date: 07/18/2025  - schedule Colonoscopy, discussed procedure with the patient, including risks and benefits, patient verbalized understanding  - discussed treatment options with patient. Patient wants to restart daily use of Miralax.  - Recommend daily exercise as tolerated, adequate water intake (six 8-oz glasses of water daily), and high fiber diet. OTC fiber supplements are recommended if diet does not reach daily fiber goal (20-30 grams daily), such as Metamucil, Citrucel, or FiberCon (take as directed, separate from other oral medications by >2 hours).  -Recommend taking an OTC stool softener such as Colace as directed to avoid hard stools and straining with bowel movements PRN  - RESTART OTC MiraLax once daily (17g PO) as directed  - If no improvement with above recommendations, try intermittently dosed senna OTC as directed (every 3-4  days) PRN to facilitate bowel movements  -If still no improvement with these measures, call/follow-up  - discussed with patient that a side effect of narcotic pain medications is constipation, advised patient to talk to provider who manages pain medication, patient verbalized understanding    History of colon polyps  - schedule Colonoscopy, discussed procedure with the patient, including risks and benefits, patient verbalized understanding    History of rectocele  -     Ambulatory referral/consult to Colorectal Surgery ([place]); Future;  Expected date: 07/18/2025  -     Ambulatory referral/consult to Urogynecology; Future; Expected date: 11/20/2024    History of hemorrhoids  - avoid constipation and straining with bowel movements; try using an OTC stool softener as directed and increase fiber in diet (20-30 grams daily)/OTC fiber supplement such as metamucil (take as directed)  - recommend SITZ baths  - possible referral to colorectal surgery if symptoms persist    Rectal pain  -     Ambulatory referral/consult to Colorectal Surgery ([place]); Future; Expected date: 07/18/2025  - schedule Colonoscopy, discussed procedure with the patient, including risks and benefits, patient verbalized understanding    Hematochezia  -     Ambulatory referral/consult to Colorectal Surgery ([place]); Future; Expected date: 07/18/2025  - schedule Colonoscopy, discussed procedure with the patient, including risks and benefits, patient verbalized understanding  - discussed about different etiologies that can cause rectal bleeding, such as but not limited to diverticulosis, polyps, colon mass, colon inflammation or infection, anal fissure or hemorrhoids.   - You may resume normal activity as long as you feel well.  - Avoid/minimize NSAIDs such as ibuprofen (Advil, Motrin) and naproxen (Aleve and Naprosyn).  - Avoid alcohol.    Heartburn  -  RESTART DAILY USE   omeprazole (PRILOSEC) 40 MG capsule; Take 1 capsule (40 mg total) by mouth before breakfast.  Dispense: 30 capsule; Refill: 1  - schedule EGD, discussed procedure with patient, including risks and benefits, patient verbalized understanding    Upper abdominal pain  -  RESTART DAILY USE   omeprazole (PRILOSEC) 40 MG capsule; Take 1 capsule (40 mg total) by mouth before breakfast.  Dispense: 30 capsule; Refill: 1  - schedule EGD, discussed procedure with patient, including risks and benefits, patient verbalized understanding  - avoid use of NSAIDs- since they can cause GI upset, bleeding and/or ulcers.  -  Recommend follow-up with Primary Care Provider for continued evaluation and management of non-GI related etiologies (possible musculoskeletal).    Antiplatelet or antithrombotic long-term use  - medication(s) may need to be held for endoscopy, nurse will confirm with endoscopist, cardiologist, and/or PCP.    Generalized body aches  - Recommend follow-up with Primary Care Provider & rheumatology for continued evaluation and management.    Follow up in about 1 month (around 8/11/2025), or if symptoms worsen or fail to improve.    If no improvement in symptoms or symptoms worsen, call/follow-up at clinic or go to ER.        43 minutes of total time spent on the encounter, which includes face to face time and non-face to face time preparing to see the patient (e.g., review of tests), Obtaining and/or reviewing separately obtained history, Documenting clinical information in the electronic or other health record, Independently interpreting results (not separately reported) and communicating results to the patient/family/caregiver, or Care coordination (not separately reported)

## 2025-07-11 NOTE — PATIENT INSTRUCTIONS
"Constipation in Adults   The Basics   Written by the doctors and editors at [facility]   What is constipation? -- Constipation is a common problem that makes it hard to have bowel movements. Your bowel movements might be:  Too hard  Too small  Hard to get out  Happening fewer than 3 times a week  What causes constipation? -- Constipation can be caused by:  Side effects of some medicines  Poor diet  Diseases of the digestive system (figure 1)  What other symptoms should I watch for? -- These symptoms could signal a more serious problem:  Blood in the toilet or on the toilet paper after having a bowel movement  Fever  Weight loss  Feeling weak  It could also be a sign of a problem if you have new constipation without a change in your medicines or diet, and have never had constipation in the past.   Is there anything I can do on my own to get rid of constipation? -- Yes. Try these steps:  Eat foods that have a lot of fiber. Good choices are fruits, vegetables, prune juice, and cereal (table 1).  Drink plenty of water and other fluids.  When you feel the need to go to the bathroom, go to the bathroom. Don't hold it.  Take laxatives. These are medicines that help make bowel movements easier to get out. Some are pills that you swallow. Others go into the rectum. These are called "suppositories."  Should I see a doctor or nurse? -- See your doctor or nurse if:   Your symptoms are new or not normal for you  You do not have a bowel movement for a few days  The problem comes and goes, but lasts for longer than 3 weeks  You are in a lot of pain  You have other symptoms that also worry you (for example, bleeding, weakness, weight loss, or fever)  Other people in your family have had colorectal cancer or inflammatory bowel disease  Are there tests I should have? -- Your doctor or nurse will decide which tests you should have based on your age, other symptoms, and individual situation. There are lots of tests, but you might not " "need any.  Here are the most common tests doctors use to find the cause of constipation:  Rectal exam - Your doctor will look at the outside of your anus. They will also use a finger to feel inside the opening.  Sigmoidoscopy or colonoscopy - For these tests, the doctor puts a thin tube into your anus. Then, they advance the tube into your large intestine. The large intestine is also called the colon. The tube has a camera attached to it, so the doctor can look inside your intestines. During these tests, the doctor can also take samples of tissue to look at under a microscope (figure 2).  X-rays, CT scan, or MRI - These create images of the inside of your body.  Manometry studies - Manometry allows the doctor to measure the pressure inside the rectum at various points. It can help the doctor find out if the muscles that control bowel movements are working right. The test also shows whether the person's rectum can feel normally.  How is constipation treated? -- That depends on what is causing your constipation. First, your doctor will want you to try eating more fiber and drinking more water. If that doesn't help, your doctor might suggest:  Medicines that you swallow or put in your rectum  Changing the medicines you are taking for other conditions  A treatment called an "enema" - For this treatment, a doctor or nurse will squirt water into your rectum. They might also use a thin tool to help break up bowel movements that are still inside you.  You might also be able to give yourself enema treatments at home, too. Enemas can be just water, or they can contain medicine to help with constipation.   Biofeedback - This is a technique that teaches you to relax your muscles so you can let go and push bowel movements out.  Can constipation be prevented? -- You can reduce your chances of getting constipation again by:  Eating a diet that is full of fiber (table 1)  Drinking water and other fluids during the day  Going to the " "bathroom at regular times every day  All topics are updated as new evidence becomes available and our peer review process is complete.  This topic retrieved from [employer] on: Sep 21, 2021.  Topic 81816 Version 8.0  Release: 29.4.2 - C29.263  © 2021 UpToDate, Inc. and/or its affiliates. All rights reserved.  figure 1: Digestive system     This drawing shows the organs in the body that process food. Together these organs are called "the digestive system," or "digestive tract." As food travels through this system, the body absorbs nutrients and water.  Graphic 24032 Version 4.0    table 1: Amount of fiber in different foods  Food  Serving  Grams of fiber    Fruits    Apple (with skin) 1 medium apple 4.4   Banana 1 medium banana 3.1   Oranges 1 orange 3.1   Prunes 1 cup, pitted 12.4   Juices    Apple, unsweetened, with added ascorbic acid 1 cup 0.5   Grapefruit, white, canned, sweetened 1 cup 0.2   Grape, unsweetened, with added ascorbic acid 1 cup 0.5   Orange 1 cup 0.7   Vegetables    Cooked   Green beans 1 cup 4.0   Carrots 1/2 cup sliced 2.3   Peas 1 cup 8.8   Potato (baked, with skin) 1 medium potato 3.8   Raw   Cucumber (with peel) 1 cucumber 1.5   Lettuce 1 cup shredded 0.5   Tomato 1 medium tomato 1.5   Spinach 1 cup 0.7   Legumes   Baked beans, canned, no salt added 1 cup 13.9   Kidney beans, canned 1 cup 13.6   Lima beans, canned 1 cup 11.6   Lentils, boiled 1 cup 15.6   Breads, pastas, flours    Bran muffins 1 medium muffin 5.2   Oatmeal, cooked 1 cup 4.0   White bread 1 slice 0.6   Whole-wheat bread 1 slice 1.9   Pasta and rice, cooked   Macaroni 1 cup 2.5   Rice, brown 1 cup 3.5   Rice, white 1 cup 0.6   Spaghetti (regular) 1 cup 2.5   Nuts    Almonds 1/2 cup 8.7   Peanuts 1/2 cup 7.9   To learn how much fiber and other nutrients are in different foods, visit the [place] Department of Agriculture (USDA) FoodData Central website.  Graphic 67625 Version 6.0  figure 2: Colonoscopy     During a " colonoscopy, you lie on your side and the doctor puts a thin tube with a camera into your anus (from behind). Then the doctor advances the tube into the rectum and colon. The camera sends pictures from inside your colon to a television screen.  Graphic 49885 Version 6.0    Consumer Information Use and Disclaimer   This information is not specific medical advice and does not replace information you receive from your health care provider. This is only a brief summary of general information. It does NOT include all information about conditions, illnesses, injuries, tests, procedures, treatments, therapies, discharge instructions or life-style choices that may apply to you. You must talk with your health care provider for complete information about your health and treatment options. This information should not be used to decide whether or not to accept your health care provider's advice, instructions or recommendations. Only your health care provider has the knowledge and training to provide advice that is right for you. The use of this information is governed by the [employer] End User License Agreement, available at [email or web address]/[email or web address].The use of [employer] content is governed by the [employer] Terms of Use. ©2021 UpToDate, Inc. All rights reserved.  Copyright   © 2021 UpToDate, Inc. and/or its affiliates. All rights reserved.

## 2025-08-08 ENCOUNTER — TELEPHONE (OUTPATIENT)
Dept: OBSTETRICS AND GYNECOLOGY | Facility: CLINIC | Age: 75
End: 2025-08-08
Payer: MEDICARE

## 2025-08-08 DIAGNOSIS — Z12.31 OTHER SCREENING MAMMOGRAM: Primary | ICD-10-CM

## 2025-08-08 NOTE — TELEPHONE ENCOUNTER
Copied from CRM #[id number]. Topic: General Inquiry - Patient Advice  >> Aug 7, 2025  4:48 PM Arnulfo Muro wrote:  Type:  Patient Requesting Referral    Who Called:pt   Does the patient already have the specialty appointment scheduled?: no  If yes, what is the date of that appointment?:  Referral to What Specialty: mammogram  Reason for Referral  Does the patient want the referral with a specific physician?:no  [facility]   Patient Requesting a Response?: yes  Would the patient rather a call back or a response via [facility]?  Call   Best Call Back Number:[phone]   Additional Information: pt states that's she would like the nurse to call her.

## 2025-08-20 ENCOUNTER — TELEPHONE (OUTPATIENT)
Dept: SURGERY | Facility: HOSPITAL | Age: 75
End: 2025-08-20
Payer: MEDICARE

## 2025-08-25 PROBLEM — M25.551 PAIN OF RIGHT HIP JOINT: Status: RESOLVED | Noted: 2024-12-01 | Resolved: 2025-08-25

## 2025-08-25 PROBLEM — M25.561 ARTHRALGIA OF BOTH KNEES: Status: RESOLVED | Noted: 2024-12-09 | Resolved: 2025-08-25

## 2025-08-25 PROBLEM — M25.562 ARTHRALGIA OF BOTH KNEES: Status: RESOLVED | Noted: 2024-12-09 | Resolved: 2025-08-25

## 2025-08-25 PROBLEM — M25.522 BILATERAL ELBOW JOINT PAIN: Status: RESOLVED | Noted: 2024-12-09 | Resolved: 2025-08-25

## 2025-08-25 PROBLEM — M25.521 BILATERAL ELBOW JOINT PAIN: Status: RESOLVED | Noted: 2024-12-09 | Resolved: 2025-08-25

## 2025-08-28 ENCOUNTER — HOSPITAL ENCOUNTER (OUTPATIENT)
Dept: RADIOLOGY | Facility: HOSPITAL | Age: 75
Discharge: HOME | End: 2025-08-28
Attending: SURGERY
Payer: MEDICARE

## 2025-08-28 DIAGNOSIS — Z12.31 OTHER SCREENING MAMMOGRAM: ICD-10-CM

## 2025-08-28 PROCEDURE — 77063 BREAST TOMOSYNTHESIS BI: CPT | Mod: 26,,, | Performed by: RADIOLOGY

## 2025-08-28 PROCEDURE — 77067 SCR MAMMO BI INCL CAD: CPT | Mod: 26,,, | Performed by: RADIOLOGY

## 2025-08-28 PROCEDURE — 77063 BREAST TOMOSYNTHESIS BI: CPT | Mod: TC,PO
